# Patient Record
Sex: MALE | Race: WHITE | URBAN - METROPOLITAN AREA
[De-identification: names, ages, dates, MRNs, and addresses within clinical notes are randomized per-mention and may not be internally consistent; named-entity substitution may affect disease eponyms.]

---

## 2017-02-07 ENCOUNTER — OFFICE VISIT (OUTPATIENT)
Dept: ORTHOPEDIC SURGERY | Age: 57
End: 2017-02-07

## 2017-02-07 VITALS
DIASTOLIC BLOOD PRESSURE: 78 MMHG | SYSTOLIC BLOOD PRESSURE: 134 MMHG | HEART RATE: 80 BPM | BODY MASS INDEX: 34.3 KG/M2 | WEIGHT: 260 LBS

## 2017-02-07 DIAGNOSIS — M17.12 PRIMARY OSTEOARTHRITIS OF LEFT KNEE: Primary | ICD-10-CM

## 2017-02-07 DIAGNOSIS — S83.242D TEAR OF MEDIAL MENISCUS OF LEFT KNEE, UNSPECIFIED TEAR TYPE, UNSPECIFIED WHETHER OLD OR CURRENT TEAR, SUBSEQUENT ENCOUNTER: ICD-10-CM

## 2017-02-07 PROBLEM — S83.242A TEAR OF MEDIAL MENISCUS OF LEFT KNEE: Status: ACTIVE | Noted: 2017-02-07

## 2017-02-07 PROCEDURE — E0114 CRUTCH UNDERARM PAIR NO WOOD: HCPCS | Performed by: ORTHOPAEDIC SURGERY

## 2017-02-07 PROCEDURE — 99214 OFFICE O/P EST MOD 30 MIN: CPT | Performed by: ORTHOPAEDIC SURGERY

## 2017-02-07 ASSESSMENT — ENCOUNTER SYMPTOMS: ROS SKIN COMMENTS: SKIN CANCER

## 2017-02-16 ENCOUNTER — TELEPHONE (OUTPATIENT)
Dept: ORTHOPEDIC SURGERY | Age: 57
End: 2017-02-16

## 2017-02-21 ENCOUNTER — HOSPITAL ENCOUNTER (OUTPATIENT)
Dept: PREADMISSION TESTING | Age: 57
Discharge: HOME | End: 2017-03-14
Attending: ORTHOPAEDIC SURGERY | Admitting: ORTHOPAEDIC SURGERY

## 2017-02-21 VITALS — HEIGHT: 72 IN | BODY MASS INDEX: 35.21 KG/M2 | WEIGHT: 260 LBS

## 2017-02-21 RX ORDER — CHLORHEXIDINE GLUCONATE 0.12 MG/ML
15 RINSE ORAL 2 TIMES DAILY
Status: CANCELLED | OUTPATIENT
Start: 2017-02-21

## 2017-02-22 ENCOUNTER — TELEPHONE (OUTPATIENT)
Dept: ORTHOPEDIC SURGERY | Age: 57
End: 2017-02-22

## 2017-03-17 ENCOUNTER — HOSPITAL ENCOUNTER (OUTPATIENT)
Dept: PREADMISSION TESTING | Age: 57
Discharge: HOME | End: 2017-03-17
Attending: ORTHOPAEDIC SURGERY | Admitting: ORTHOPAEDIC SURGERY

## 2017-03-17 VITALS — HEIGHT: 72 IN | BODY MASS INDEX: 35.21 KG/M2 | WEIGHT: 260 LBS

## 2017-03-22 ENCOUNTER — HOSPITAL ENCOUNTER (OUTPATIENT)
Dept: SURGERY | Age: 57
Discharge: HOME | End: 2017-03-22
Admitting: ORTHOPAEDIC SURGERY

## 2017-03-22 VITALS
HEIGHT: 72 IN | TEMPERATURE: 97.5 F | HEART RATE: 58 BPM | BODY MASS INDEX: 35.21 KG/M2 | RESPIRATION RATE: 9 BRPM | WEIGHT: 260 LBS | OXYGEN SATURATION: 97 % | DIASTOLIC BLOOD PRESSURE: 63 MMHG | SYSTOLIC BLOOD PRESSURE: 109 MMHG

## 2017-03-22 LAB
INR BLD: 1.04 (ref 0.85–1.15)
PROTHROMBIN TIME: 11.7 SEC (ref 9.6–13)

## 2017-03-22 RX ORDER — MORPHINE SULFATE 2 MG/ML
2 INJECTION, SOLUTION INTRAMUSCULAR; INTRAVENOUS EVERY 5 MIN PRN
Status: DISCONTINUED | OUTPATIENT
Start: 2017-03-22 | End: 2017-03-23 | Stop reason: HOSPADM

## 2017-03-22 RX ORDER — MIDAZOLAM HYDROCHLORIDE 1 MG/ML
2 INJECTION INTRAMUSCULAR; INTRAVENOUS
Status: ACTIVE | OUTPATIENT
Start: 2017-03-22 | End: 2017-03-22

## 2017-03-22 RX ORDER — ONDANSETRON 2 MG/ML
4 INJECTION INTRAMUSCULAR; INTRAVENOUS
Status: ACTIVE | OUTPATIENT
Start: 2017-03-22 | End: 2017-03-22

## 2017-03-22 RX ORDER — METOCLOPRAMIDE HYDROCHLORIDE 5 MG/ML
10 INJECTION INTRAMUSCULAR; INTRAVENOUS ONCE
Status: COMPLETED | OUTPATIENT
Start: 2017-03-22 | End: 2017-03-22

## 2017-03-22 RX ORDER — SODIUM CHLORIDE, SODIUM LACTATE, POTASSIUM CHLORIDE, CALCIUM CHLORIDE 600; 310; 30; 20 MG/100ML; MG/100ML; MG/100ML; MG/100ML
INJECTION, SOLUTION INTRAVENOUS CONTINUOUS
Status: DISCONTINUED | OUTPATIENT
Start: 2017-03-22 | End: 2017-03-23 | Stop reason: HOSPADM

## 2017-03-22 RX ORDER — DEXAMETHASONE SODIUM PHOSPHATE 4 MG/ML
10 INJECTION, SOLUTION INTRA-ARTICULAR; INTRALESIONAL; INTRAMUSCULAR; INTRAVENOUS; SOFT TISSUE ONCE
Status: COMPLETED | OUTPATIENT
Start: 2017-03-22 | End: 2017-03-22

## 2017-03-22 RX ORDER — GLYCOPYRROLATE 0.2 MG/ML
0.1 INJECTION INTRAMUSCULAR; INTRAVENOUS ONCE
Status: DISCONTINUED | OUTPATIENT
Start: 2017-03-22 | End: 2017-03-23 | Stop reason: HOSPADM

## 2017-03-22 RX ORDER — SODIUM CHLORIDE 0.9 % (FLUSH) 0.9 %
10 SYRINGE (ML) INJECTION EVERY 12 HOURS SCHEDULED
Status: DISCONTINUED | OUTPATIENT
Start: 2017-03-22 | End: 2017-03-23 | Stop reason: HOSPADM

## 2017-03-22 RX ORDER — MEPERIDINE HYDROCHLORIDE 25 MG/ML
12.5 INJECTION INTRAMUSCULAR; INTRAVENOUS; SUBCUTANEOUS EVERY 5 MIN PRN
Status: DISCONTINUED | OUTPATIENT
Start: 2017-03-22 | End: 2017-03-23 | Stop reason: HOSPADM

## 2017-03-22 RX ORDER — GLYCOPYRROLATE 0.2 MG/ML
0.1 INJECTION INTRAMUSCULAR; INTRAVENOUS ONCE
Status: COMPLETED | OUTPATIENT
Start: 2017-03-22 | End: 2017-03-22

## 2017-03-22 RX ORDER — LABETALOL HYDROCHLORIDE 5 MG/ML
5 INJECTION, SOLUTION INTRAVENOUS EVERY 10 MIN PRN
Status: DISCONTINUED | OUTPATIENT
Start: 2017-03-22 | End: 2017-03-23 | Stop reason: HOSPADM

## 2017-03-22 RX ORDER — SODIUM CHLORIDE 0.9 % (FLUSH) 0.9 %
10 SYRINGE (ML) INJECTION PRN
Status: DISCONTINUED | OUTPATIENT
Start: 2017-03-22 | End: 2017-03-23 | Stop reason: HOSPADM

## 2017-03-22 RX ORDER — CHLORHEXIDINE GLUCONATE 0.12 MG/ML
15 RINSE ORAL 2 TIMES DAILY
Status: DISCONTINUED | OUTPATIENT
Start: 2017-03-22 | End: 2017-03-23 | Stop reason: HOSPADM

## 2017-03-22 RX ORDER — ONDANSETRON 2 MG/ML
4 INJECTION INTRAMUSCULAR; INTRAVENOUS ONCE
Status: COMPLETED | OUTPATIENT
Start: 2017-03-22 | End: 2017-03-22

## 2017-03-22 RX ORDER — FENTANYL CITRATE 50 UG/ML
50 INJECTION, SOLUTION INTRAMUSCULAR; INTRAVENOUS EVERY 5 MIN PRN
Status: DISCONTINUED | OUTPATIENT
Start: 2017-03-22 | End: 2017-03-23 | Stop reason: HOSPADM

## 2017-03-22 RX ORDER — CLINDAMYCIN PHOSPHATE 900 MG/50ML
900 INJECTION INTRAVENOUS ONCE
Status: DISCONTINUED | OUTPATIENT
Start: 2017-03-22 | End: 2017-03-23 | Stop reason: HOSPADM

## 2017-03-22 RX ADMIN — CHLORHEXIDINE GLUCONATE 15 ML: 0.12 RINSE ORAL at 11:56

## 2017-03-22 RX ADMIN — METOCLOPRAMIDE HYDROCHLORIDE 10 MG: 5 INJECTION INTRAMUSCULAR; INTRAVENOUS at 08:08

## 2017-03-22 RX ADMIN — GLYCOPYRROLATE 0.1 MG: 0.2 INJECTION INTRAMUSCULAR; INTRAVENOUS at 08:13

## 2017-03-22 RX ADMIN — DEXAMETHASONE SODIUM PHOSPHATE 10 MG: 4 INJECTION, SOLUTION INTRA-ARTICULAR; INTRALESIONAL; INTRAMUSCULAR; INTRAVENOUS; SOFT TISSUE at 08:03

## 2017-03-22 RX ADMIN — ONDANSETRON 4 MG: 2 INJECTION INTRAMUSCULAR; INTRAVENOUS at 08:07

## 2017-03-22 RX ADMIN — CHLORHEXIDINE GLUCONATE 15 ML: 0.12 RINSE ORAL at 07:12

## 2017-03-22 RX ADMIN — SODIUM CHLORIDE, SODIUM LACTATE, POTASSIUM CHLORIDE, CALCIUM CHLORIDE: 600; 310; 30; 20 INJECTION, SOLUTION INTRAVENOUS at 07:06

## 2017-03-22 ASSESSMENT — PAIN - FUNCTIONAL ASSESSMENT: PAIN_FUNCTIONAL_ASSESSMENT: 0-10

## 2017-03-24 ENCOUNTER — HOSPITAL ENCOUNTER (OUTPATIENT)
Dept: PHYSICAL THERAPY | Age: 57
Discharge: HOME | End: 2017-03-24
Admitting: ORTHOPAEDIC SURGERY

## 2017-03-24 ENCOUNTER — OFFICE VISIT (OUTPATIENT)
Dept: ORTHOPEDIC SURGERY | Age: 57
End: 2017-03-24

## 2017-03-24 VITALS
HEART RATE: 68 BPM | BODY MASS INDEX: 35.21 KG/M2 | SYSTOLIC BLOOD PRESSURE: 124 MMHG | WEIGHT: 260 LBS | DIASTOLIC BLOOD PRESSURE: 72 MMHG | HEIGHT: 72 IN

## 2017-03-24 DIAGNOSIS — Z98.890 S/P LEFT KNEE ARTHROSCOPY: Primary | ICD-10-CM

## 2017-03-24 DIAGNOSIS — S83.242D TEAR OF MEDIAL MENISCUS OF LEFT KNEE, UNSPECIFIED TEAR TYPE, UNSPECIFIED WHETHER OLD OR CURRENT TEAR, SUBSEQUENT ENCOUNTER: ICD-10-CM

## 2017-03-24 PROCEDURE — 99024 POSTOP FOLLOW-UP VISIT: CPT | Performed by: ORTHOPAEDIC SURGERY

## 2017-03-24 PROCEDURE — 20610 DRAIN/INJ JOINT/BURSA W/O US: CPT | Performed by: ORTHOPAEDIC SURGERY

## 2017-03-24 ASSESSMENT — ENCOUNTER SYMPTOMS: ROS SKIN COMMENTS: SKIN CANCER

## 2017-03-28 ENCOUNTER — HOSPITAL ENCOUNTER (OUTPATIENT)
Dept: PHYSICAL THERAPY | Age: 57
Discharge: HOME | End: 2017-03-28
Admitting: ORTHOPAEDIC SURGERY

## 2017-03-29 ENCOUNTER — TELEPHONE (OUTPATIENT)
Dept: PHYSICAL THERAPY | Age: 57
End: 2017-03-29

## 2017-03-30 ENCOUNTER — HOSPITAL ENCOUNTER (OUTPATIENT)
Dept: PHYSICAL THERAPY | Age: 57
Discharge: HOME | End: 2017-03-30
Admitting: ORTHOPAEDIC SURGERY

## 2017-04-04 ENCOUNTER — HOSPITAL ENCOUNTER (OUTPATIENT)
Dept: PHYSICAL THERAPY | Age: 57
Discharge: HOME | End: 2017-04-04
Admitting: ORTHOPAEDIC SURGERY

## 2017-04-06 ENCOUNTER — HOSPITAL ENCOUNTER (OUTPATIENT)
Dept: PHYSICAL THERAPY | Age: 57
Discharge: HOME | End: 2017-04-06
Admitting: ORTHOPAEDIC SURGERY

## 2017-04-21 ENCOUNTER — OFFICE VISIT (OUTPATIENT)
Dept: ORTHOPEDIC SURGERY | Age: 57
End: 2017-04-21

## 2017-04-21 VITALS
WEIGHT: 259.92 LBS | BODY MASS INDEX: 35.21 KG/M2 | DIASTOLIC BLOOD PRESSURE: 86 MMHG | HEIGHT: 72 IN | SYSTOLIC BLOOD PRESSURE: 126 MMHG | HEART RATE: 84 BPM

## 2017-04-21 DIAGNOSIS — S83.242A ACUTE MEDIAL MENISCUS TEAR OF LEFT KNEE, INITIAL ENCOUNTER: Primary | ICD-10-CM

## 2017-04-21 PROCEDURE — 99024 POSTOP FOLLOW-UP VISIT: CPT | Performed by: ORTHOPAEDIC SURGERY

## 2017-04-21 ASSESSMENT — ENCOUNTER SYMPTOMS: ROS SKIN COMMENTS: SKIN CANCER

## 2021-11-14 ENCOUNTER — HOSPITAL ENCOUNTER (EMERGENCY)
Age: 61
Discharge: HOME | End: 2021-11-14
Attending: EMERGENCY MEDICINE
Payer: COMMERCIAL

## 2021-11-14 VITALS
DIASTOLIC BLOOD PRESSURE: 78 MMHG | SYSTOLIC BLOOD PRESSURE: 129 MMHG | HEART RATE: 69 BPM | RESPIRATION RATE: 18 BRPM | BODY MASS INDEX: 33.8 KG/M2 | HEIGHT: 73 IN | OXYGEN SATURATION: 97 % | WEIGHT: 255 LBS | TEMPERATURE: 97.9 F

## 2021-11-14 DIAGNOSIS — M54.50 ACUTE BILATERAL LOW BACK PAIN WITHOUT SCIATICA: Primary | ICD-10-CM

## 2021-11-14 PROCEDURE — 96372 THER/PROPH/DIAG INJ SC/IM: CPT

## 2021-11-14 PROCEDURE — 74011000250 HC RX REV CODE- 250: Performed by: PHYSICIAN ASSISTANT

## 2021-11-14 PROCEDURE — 74011250637 HC RX REV CODE- 250/637: Performed by: PHYSICIAN ASSISTANT

## 2021-11-14 PROCEDURE — 74011250636 HC RX REV CODE- 250/636: Performed by: PHYSICIAN ASSISTANT

## 2021-11-14 PROCEDURE — 99283 EMERGENCY DEPT VISIT LOW MDM: CPT

## 2021-11-14 RX ORDER — LIDOCAINE 4 G/100G
2 PATCH TOPICAL
Status: DISCONTINUED | OUTPATIENT
Start: 2021-11-14 | End: 2021-11-14 | Stop reason: HOSPADM

## 2021-11-14 RX ORDER — HYDROCODONE BITARTRATE AND ACETAMINOPHEN 5; 325 MG/1; MG/1
1 TABLET ORAL
Status: COMPLETED | OUTPATIENT
Start: 2021-11-14 | End: 2021-11-14

## 2021-11-14 RX ORDER — IBUPROFEN 800 MG/1
800 TABLET ORAL
Qty: 20 TABLET | Refills: 0 | Status: SHIPPED | OUTPATIENT
Start: 2021-11-14 | End: 2021-11-21

## 2021-11-14 RX ORDER — KETOROLAC TROMETHAMINE 30 MG/ML
30 INJECTION, SOLUTION INTRAMUSCULAR; INTRAVENOUS
Status: COMPLETED | OUTPATIENT
Start: 2021-11-14 | End: 2021-11-14

## 2021-11-14 RX ORDER — LIDOCAINE 50 MG/G
PATCH TOPICAL
Qty: 8 PATCH | Refills: 0 | Status: SHIPPED | OUTPATIENT
Start: 2021-11-14 | End: 2022-01-19

## 2021-11-14 RX ORDER — CYCLOBENZAPRINE HCL 10 MG
10 TABLET ORAL
Qty: 10 TABLET | Refills: 0 | Status: SHIPPED | OUTPATIENT
Start: 2021-11-14 | End: 2021-11-17

## 2021-11-14 RX ADMIN — KETOROLAC TROMETHAMINE 30 MG: 30 INJECTION, SOLUTION INTRAMUSCULAR; INTRAVENOUS at 16:08

## 2021-11-14 RX ADMIN — HYDROCODONE BITARTRATE AND ACETAMINOPHEN 1 TABLET: 5; 325 TABLET ORAL at 16:08

## 2021-11-14 NOTE — ED PROVIDER NOTES
28-year-old male who presents with chief complaint of lower back pain. This pain started abruptly after he bent over to  some heavy objects this morning. He states he and his wife just moved here from [place], for the last 3 weeks he has been doing lots of heavy lifting. He has had minor back strains during this time that have been relieved with his prescription for Flexeril and hydrocodone. Has taken 2 of these today with no improvement of his symptoms. He has no associated symptoms such as bladder or bowel incontinence, he does not have any saddle anesthesia. He has no radiation of his pain, denies any chest pain, dizziness, headache, visual changes, focal motor weakness or focal neuro deficit. Past Medical History:   Diagnosis Date    A-fib ([facility] [address]     BPH (benign prostatic hyperplasia)     Sarcoidosis        Past Surgical History:   Procedure Laterality Date    HX ORTHOPAEDIC      3 knee surgeries          No family history on file. Social History     Socioeconomic History    Marital status:      Spouse name: Not on file    Number of children: Not on file    Years of education: Not on file    Highest education level: Not on file   Occupational History    Not on file   Tobacco Use    Smoking status: Never Smoker    Smokeless tobacco: Never Used   Substance and Sexual Activity    Alcohol use: Yes     Alcohol/week: 3.0 standard drinks     Types: 3 Glasses of wine per week    Drug use: Never    Sexual activity: Not on file   Other Topics Concern    Not on file   Social History Narrative    Not on file     Social Determinants of Health     Financial Resource Strain:     Difficulty of Paying Living Expenses: Not on file   Food Insecurity:     Worried About Running Out of Food in the Last Year: Not on file    Sheri of Food in the Last Year: Not on file   Transportation Needs:     Lack of Transportation (Medical):  Not on file    Lack of Transportation (Non-Medical): Not on file   Physical Activity:     Days of Exercise per Week: Not on file    Minutes of Exercise per Session: Not on file   Stress:     Feeling of Stress : Not on file   Social Connections:     Frequency of Communication with Friends and Family: Not on file    Frequency of Social Gatherings with Friends and Family: Not on file    Attends [employer] Services: Not on file    Active Member of [address] [place] [address] or Organizations: Not on file    Attends Club or Organization Meetings: Not on file    Marital Status: Not on file   Intimate Partner Violence:     Fear of Current or Ex-Partner: Not on file    Emotionally Abused: Not on file    Physically Abused: Not on file    Sexually Abused: Not on file   Housing Stability:     Unable to Pay for Housing in the Last Year: Not on file    Number of [place] in the Last Year: Not on file    Unstable Housing in the Last Year: Not on file         ALLERGIES: Amoxicillin    Review of Systems   Constitutional: Negative for activity change and chills. HENT: Negative for nosebleeds. Respiratory: Negative for cough. Gastrointestinal: Negative for nausea. Genitourinary: Negative for flank pain. Musculoskeletal: Positive for back pain. Negative for arthralgias, gait problem, joint swelling, neck pain and neck stiffness. Neurological: Negative for seizures, light-headedness and numbness. Vitals:    11/14/21 1456 11/14/21 1521   BP: (!) 148/102    Pulse: 69    Resp: 18    Temp: 97.9 °F (36.6 °C)    SpO2: 95% 95%   Weight: 115.7 kg (255 lb)    Height: 6' 1\" (1.854 m)             Physical Exam  Vitals and nursing note reviewed. Constitutional:       General: He is not in acute distress. Appearance: Normal appearance. He is normal weight. He is not ill-appearing, toxic-appearing or diaphoretic. HENT:      Head: Normocephalic and atraumatic.       Nose: Nose normal.      Mouth/Throat:      Mouth: Mucous membranes are moist.   Eyes:      Pupils: Pupils are equal, round, and reactive to light. Cardiovascular:      Rate and Rhythm: Normal rate and regular rhythm. Pulmonary:      Effort: Pulmonary effort is normal.   Abdominal:      General: Abdomen is flat. Palpations: Abdomen is soft. Musculoskeletal:        Back:    Neurological:      Mental Status: He is alert. MDM  Number of Diagnoses or Management Options  Diagnosis management comments: 27-year-old male who presents to the ED with his wife with chief complaint of lower back pain. This back pain started acutely this morning as he bent over to lift some heavy boxes. He is a nonemergent exam.  Has got no point tenderness along the spine. This is nontraumatic back pain. Has had muscle strains in the past patient states this feels similar however much worse. He did have some relief. Emergency room after administration of lidocaine patches, hydrocodone and Toradol here. Advised that he continue these at home. He has home prescriptions of hydrocodone. Will refill his Flexeril at a higher dose. Advised ibuprofen at home as well. He is without saddle anesthesia, bladder or bowel incontinence, there are no warning signs or red flags for acute cord compression syndrome in this patient. He is able to ambulate with a cane which is at his baseline. He has no lower extremity weakness. Will discharge home with instructions to follow-up with primary care and orthopedics, both primary care and orthopedics offices will provide outpatient discharge as he recently moved here from [place]. Questions were answered and we are agreeable and is discharged at this time. Voice dictation software was used during the making of this note. This software is not perfect and grammatical and other typographical errors may be present. This note has been proofread, but may still contain errors.    Melody Dillon PA-C        Amount and/or Complexity of Data Reviewed  Clinical lab tests: ordered and reviewed    Risk of Complications, Morbidity, and/or Mortality  Presenting problems: low  Diagnostic procedures: low  Management options: low    Patient Progress  Patient progress: improved         Procedures

## 2021-11-14 NOTE — DISCHARGE INSTRUCTIONS
Use your new medications as prescribed. Only use your hydrocodone which you already have a prescription for if you have breakthrough pain. Follow-up with primary care which you have been given information. Also follow-up with orthopedics. Return here for worsening or worrisome symptoms.

## 2021-11-14 NOTE — ED NOTES
I have reviewed discharge instructions with the patient. The patient verbalized understanding. Patient left ED via Discharge Method: ambulatory to Home with his wife. .  Opportunity for questions and clarification provided. Patient given 3 scripts. To continue your aftercare when you leave the hospital, you may receive an automated call from our care team to check in on how you are doing.  This is a free service and part of our promise to provide the best care and service to meet your aftercare needs. \" If you have questions, or wish to unsubscribe from this service please call [phone].  Thank you for Choosing our [facility] Emergency Department.

## 2021-11-14 NOTE — ED TRIAGE NOTES
Patient presents with complaints lower back pain worsening over the day. Patient states that he bent over this morning had pain. Patient denies bowel or bladder incontinence. Patient states that there are times that he is unable to make it to the bathroom due to the pain but has urge to urinate.

## 2022-01-05 PROBLEM — K21.00 GASTROESOPHAGEAL REFLUX DISEASE WITH ESOPHAGITIS: Status: ACTIVE | Noted: 2022-01-05

## 2022-01-05 PROBLEM — E66.9 OBESITY (BMI 35.0-39.9 WITHOUT COMORBIDITY): Status: ACTIVE | Noted: 2022-01-05

## 2022-01-05 PROBLEM — Z85.828 HX OF SKIN CANCER, BASAL CELL: Status: ACTIVE | Noted: 2022-01-05

## 2022-01-05 PROBLEM — H91.93 BILATERAL HEARING LOSS: Status: ACTIVE | Noted: 2022-01-05

## 2022-01-05 PROBLEM — K21.9 GASTROESOPHAGEAL REFLUX DISEASE: Status: ACTIVE | Noted: 2022-01-05

## 2022-01-05 PROBLEM — G62.9 PERIPHERAL POLYNEUROPATHY: Status: ACTIVE | Noted: 2022-01-05

## 2022-01-05 PROBLEM — E55.9 VITAMIN D DEFICIENCY: Status: ACTIVE | Noted: 2022-01-05

## 2022-01-05 PROBLEM — M47.816 LUMBAR SPONDYLOSIS: Status: ACTIVE | Noted: 2022-01-05

## 2022-01-05 PROBLEM — I10 HYPERTENSION: Status: ACTIVE | Noted: 2022-01-05

## 2022-01-05 PROBLEM — C61 PROSTATE CANCER (HCC): Status: ACTIVE | Noted: 2022-01-05

## 2022-01-05 PROBLEM — Z00.00 PREVENTATIVE HEALTH CARE: Status: ACTIVE | Noted: 2022-01-05

## 2022-01-12 ENCOUNTER — HOSPITAL ENCOUNTER (EMERGENCY)
Age: 62
Discharge: HOME | End: 2022-01-12
Attending: STUDENT IN AN ORGANIZED HEALTH CARE EDUCATION/TRAINING PROGRAM
Payer: COMMERCIAL

## 2022-01-12 ENCOUNTER — APPOINTMENT (OUTPATIENT)
Dept: GENERAL RADIOLOGY | Age: 62
End: 2022-01-12
Attending: EMERGENCY MEDICINE
Payer: COMMERCIAL

## 2022-01-12 ENCOUNTER — HOSPITAL ENCOUNTER (OUTPATIENT)
Dept: PHYSICAL THERAPY | Age: 62
End: 2022-01-12
Attending: INTERNAL MEDICINE

## 2022-01-12 VITALS
WEIGHT: 260 LBS | SYSTOLIC BLOOD PRESSURE: 143 MMHG | HEART RATE: 87 BPM | HEIGHT: 73 IN | BODY MASS INDEX: 34.46 KG/M2 | DIASTOLIC BLOOD PRESSURE: 97 MMHG | TEMPERATURE: 98.8 F | OXYGEN SATURATION: 100 % | RESPIRATION RATE: 16 BRPM

## 2022-01-12 DIAGNOSIS — J18.9 PNEUMONIA OF RIGHT LOWER LOBE DUE TO INFECTIOUS ORGANISM: Primary | ICD-10-CM

## 2022-01-12 LAB
COVID-19 RAPID TEST, COVR: NOT DETECTED
SOURCE, COVRS: NORMAL

## 2022-01-12 PROCEDURE — 71046 X-RAY EXAM CHEST 2 VIEWS: CPT

## 2022-01-12 PROCEDURE — 99282 EMERGENCY DEPT VISIT SF MDM: CPT

## 2022-01-12 PROCEDURE — 87635 SARS-COV-2 COVID-19 AMP PRB: CPT

## 2022-01-12 RX ORDER — CEFPODOXIME PROXETIL 200 MG/1
200 TABLET, FILM COATED ORAL 2 TIMES DAILY
Qty: 10 TABLET | Refills: 0 | Status: SHIPPED | OUTPATIENT
Start: 2022-01-12 | End: 2022-01-17

## 2022-01-12 RX ORDER — AZITHROMYCIN 250 MG/1
TABLET, FILM COATED ORAL
Qty: 6 TABLET | Refills: 0 | Status: SHIPPED | OUTPATIENT
Start: 2022-01-12 | End: 2022-01-17

## 2022-01-12 RX ORDER — BENZONATATE 100 MG/1
100 CAPSULE ORAL
Qty: 21 CAPSULE | Refills: 0 | Status: SHIPPED | OUTPATIENT
Start: 2022-01-12 | End: 2022-01-19

## 2022-01-12 NOTE — DISCHARGE INSTRUCTIONS
Make sure to get plenty of rest, treat fever with tylenol and/or motrin. Can take tessalon pearls every 8 hours for cough. Take full course of antibiotics and follow up with your doctor in 2 days for re-evaluation. Return to the ER with any worsening fevers, difficulty breathing or concerning symptoms.

## 2022-01-12 NOTE — ED NOTES
I have reviewed discharge instructions with the patient. The patient verbalized understanding. Patient left ED via Discharge Method: ambulatory to Home with self transport. Opportunity for questions and clarification provided. Patient given 3 scripts. To continue your aftercare when you leave the hospital, you may receive an automated call from our care team to check in on how you are doing. This is a free service and part of our promise to provide the best care and service to meet your aftercare needs.  If you have questions, or wish to unsubscribe from this service please call [phone]. Thank you for Choosing our [address] [place] [address] Emergency Department.

## 2022-01-12 NOTE — ED TRIAGE NOTES
Pt arrives via pov for concerns for covid. States since Sunday Right rib pain with deep breathing and cough. States fever of 100.5 this morning. Did not take meds pta. Fully vaccinated. NAD. Masked.

## 2022-01-19 PROBLEM — G47.33 OBSTRUCTIVE SLEEP APNEA SYNDROME: Status: ACTIVE | Noted: 2022-01-19

## 2022-01-19 PROBLEM — R73.03 PREDIABETES: Status: ACTIVE | Noted: 2022-01-19

## 2022-01-26 PROBLEM — R07.89 CHEST WALL PAIN: Status: ACTIVE | Noted: 2022-01-26

## 2022-01-27 ENCOUNTER — HOSPITAL ENCOUNTER (OUTPATIENT)
Age: 62
Setting detail: OBSERVATION
Discharge: HOME | End: 2022-01-28
Attending: EMERGENCY MEDICINE | Admitting: FAMILY MEDICINE
Payer: COMMERCIAL

## 2022-01-27 ENCOUNTER — HOSPITAL ENCOUNTER (OUTPATIENT)
Dept: CT IMAGING | Age: 62
Discharge: HOME | End: 2022-01-27
Attending: INTERNAL MEDICINE
Payer: COMMERCIAL

## 2022-01-27 ENCOUNTER — APPOINTMENT (OUTPATIENT)
Dept: CT IMAGING | Age: 62
End: 2022-01-27
Attending: EMERGENCY MEDICINE
Payer: COMMERCIAL

## 2022-01-27 DIAGNOSIS — R07.89 CHEST WALL PAIN: ICD-10-CM

## 2022-01-27 DIAGNOSIS — I26.99 OTHER ACUTE PULMONARY EMBOLISM WITHOUT ACUTE COR PULMONALE (HCC): Primary | ICD-10-CM

## 2022-01-27 LAB
ALBUMIN SERPL-MCNC: 3.4 G/DL (ref 3.2–4.6)
ALBUMIN/GLOB SERPL: 0.9 {RATIO} (ref 1.2–3.5)
ALP SERPL-CCNC: 105 U/L (ref 50–136)
ALT SERPL-CCNC: 26 U/L (ref 12–65)
ANION GAP SERPL CALC-SCNC: 5 MMOL/L (ref 7–16)
APTT PPP: 30.1 SEC (ref 24.1–35.1)
AST SERPL-CCNC: 18 U/L (ref 15–37)
BASOPHILS # BLD: 0 K/UL (ref 0–0.2)
BASOPHILS NFR BLD: 0 % (ref 0–2)
BILIRUB SERPL-MCNC: 0.3 MG/DL (ref 0.2–1.1)
BNP SERPL-MCNC: 45 PG/ML (ref 5–125)
BUN SERPL-MCNC: 11 MG/DL (ref 8–23)
CALCIUM SERPL-MCNC: 9.1 MG/DL (ref 8.3–10.4)
CHLORIDE SERPL-SCNC: 110 MMOL/L (ref 98–107)
CO2 SERPL-SCNC: 27 MMOL/L (ref 21–32)
CREAT SERPL-MCNC: 0.96 MG/DL (ref 0.8–1.5)
DIFFERENTIAL METHOD BLD: NORMAL
EOSINOPHIL # BLD: 0.3 K/UL (ref 0–0.8)
EOSINOPHIL NFR BLD: 3 % (ref 0.5–7.8)
ERYTHROCYTE [DISTWIDTH] IN BLOOD BY AUTOMATED COUNT: 12.7 % (ref 11.9–14.6)
GLOBULIN SER CALC-MCNC: 3.7 G/DL (ref 2.3–3.5)
GLUCOSE SERPL-MCNC: 125 MG/DL (ref 65–100)
HCT VFR BLD AUTO: 43.3 % (ref 41.1–50.3)
HGB BLD-MCNC: 14.5 G/DL (ref 13.6–17.2)
IMM GRANULOCYTES # BLD AUTO: 0.1 K/UL (ref 0–0.5)
IMM GRANULOCYTES NFR BLD AUTO: 1 % (ref 0–5)
INR PPP: 1
LIPASE SERPL-CCNC: 102 U/L (ref 73–393)
LYMPHOCYTES # BLD: 2.7 K/UL (ref 0.5–4.6)
LYMPHOCYTES NFR BLD: 29 % (ref 13–44)
MAGNESIUM SERPL-MCNC: 2.3 MG/DL (ref 1.8–2.4)
MCH RBC QN AUTO: 30 PG (ref 26.1–32.9)
MCHC RBC AUTO-ENTMCNC: 33.5 G/DL (ref 31.4–35)
MCV RBC AUTO: 89.5 FL (ref 79.6–97.8)
MONOCYTES # BLD: 1 K/UL (ref 0.1–1.3)
MONOCYTES NFR BLD: 10 % (ref 4–12)
NEUTS SEG # BLD: 5.4 K/UL (ref 1.7–8.2)
NEUTS SEG NFR BLD: 58 % (ref 43–78)
NRBC # BLD: 0 K/UL (ref 0–0.2)
PLATELET # BLD AUTO: 315 K/UL (ref 150–450)
PMV BLD AUTO: 10 FL (ref 9.4–12.3)
POTASSIUM SERPL-SCNC: 3.5 MMOL/L (ref 3.5–5.1)
PROT SERPL-MCNC: 7.1 G/DL (ref 6.3–8.2)
PROTHROMBIN TIME: 13.3 SEC (ref 12.6–14.5)
RBC # BLD AUTO: 4.84 M/UL (ref 4.23–5.6)
SODIUM SERPL-SCNC: 142 MMOL/L (ref 138–145)
TROPONIN-HIGH SENSITIVITY: 5.1 PG/ML (ref 0–14)
WBC # BLD AUTO: 9.4 K/UL (ref 4.3–11.1)

## 2022-01-27 PROCEDURE — 84484 ASSAY OF TROPONIN QUANT: CPT

## 2022-01-27 PROCEDURE — 83735 ASSAY OF MAGNESIUM: CPT

## 2022-01-27 PROCEDURE — 71260 CT THORAX DX C+: CPT

## 2022-01-27 PROCEDURE — 96365 THER/PROPH/DIAG IV INF INIT: CPT

## 2022-01-27 PROCEDURE — 70450 CT HEAD/BRAIN W/O DYE: CPT

## 2022-01-27 PROCEDURE — 80053 COMPREHEN METABOLIC PANEL: CPT

## 2022-01-27 PROCEDURE — 96376 TX/PRO/DX INJ SAME DRUG ADON: CPT

## 2022-01-27 PROCEDURE — 85610 PROTHROMBIN TIME: CPT

## 2022-01-27 PROCEDURE — 85025 COMPLETE CBC W/AUTO DIFF WBC: CPT

## 2022-01-27 PROCEDURE — 96366 THER/PROPH/DIAG IV INF ADDON: CPT

## 2022-01-27 PROCEDURE — 96375 TX/PRO/DX INJ NEW DRUG ADDON: CPT

## 2022-01-27 PROCEDURE — 74011000636 HC RX REV CODE- 636: Performed by: INTERNAL MEDICINE

## 2022-01-27 PROCEDURE — 93005 ELECTROCARDIOGRAM TRACING: CPT | Performed by: EMERGENCY MEDICINE

## 2022-01-27 PROCEDURE — 83690 ASSAY OF LIPASE: CPT

## 2022-01-27 PROCEDURE — 74011250637 HC RX REV CODE- 250/637: Performed by: EMERGENCY MEDICINE

## 2022-01-27 PROCEDURE — 83880 ASSAY OF NATRIURETIC PEPTIDE: CPT

## 2022-01-27 PROCEDURE — 74011250636 HC RX REV CODE- 250/636: Performed by: EMERGENCY MEDICINE

## 2022-01-27 PROCEDURE — 99284 EMERGENCY DEPT VISIT MOD MDM: CPT

## 2022-01-27 PROCEDURE — 85730 THROMBOPLASTIN TIME PARTIAL: CPT

## 2022-01-27 PROCEDURE — 74011000258 HC RX REV CODE- 258: Performed by: INTERNAL MEDICINE

## 2022-01-27 RX ORDER — HEPARIN SODIUM 5000 [USP'U]/100ML
18-36 INJECTION, SOLUTION INTRAVENOUS
Status: DISCONTINUED | OUTPATIENT
Start: 2022-01-27 | End: 2022-01-28 | Stop reason: HOSPADM

## 2022-01-27 RX ORDER — NALBUPHINE HYDROCHLORIDE 10 MG/ML
5 INJECTION, SOLUTION INTRAMUSCULAR; INTRAVENOUS; SUBCUTANEOUS
Status: COMPLETED | OUTPATIENT
Start: 2022-01-27 | End: 2022-01-27

## 2022-01-27 RX ORDER — SODIUM CHLORIDE 0.9 % (FLUSH) 0.9 %
10 SYRINGE (ML) INJECTION
Status: COMPLETED | OUTPATIENT
Start: 2022-01-27 | End: 2022-01-27

## 2022-01-27 RX ORDER — HEPARIN SODIUM 1000 [USP'U]/ML
80 INJECTION, SOLUTION INTRAVENOUS; SUBCUTANEOUS ONCE
Status: COMPLETED | OUTPATIENT
Start: 2022-01-27 | End: 2022-01-27

## 2022-01-27 RX ORDER — ACETAMINOPHEN 500 MG
1000 TABLET ORAL
Status: COMPLETED | OUTPATIENT
Start: 2022-01-27 | End: 2022-01-27

## 2022-01-27 RX ADMIN — NALBUPHINE HYDROCHLORIDE 5 MG: 10 INJECTION, SOLUTION INTRAMUSCULAR; INTRAVENOUS; SUBCUTANEOUS at 23:27

## 2022-01-27 RX ADMIN — IOPAMIDOL 100 ML: 755 INJECTION, SOLUTION INTRAVENOUS at 16:47

## 2022-01-27 RX ADMIN — SODIUM CHLORIDE 100 ML: 9 INJECTION, SOLUTION INTRAVENOUS at 16:47

## 2022-01-27 RX ADMIN — Medication 10 ML: at 16:47

## 2022-01-27 RX ADMIN — ACETAMINOPHEN 1000 MG: 500 TABLET ORAL at 21:56

## 2022-01-27 RX ADMIN — HEPARIN SODIUM 18 UNITS/KG/HR: 5000 INJECTION, SOLUTION INTRAVENOUS at 22:09

## 2022-01-27 RX ADMIN — HEPARIN SODIUM 7610 UNITS: 1000 INJECTION INTRAVENOUS; SUBCUTANEOUS at 22:08

## 2022-01-27 NOTE — ED TRIAGE NOTES
Patient to ED earlier with R rib pain, CT scan completed at 4pm today here in ED and was seen by a provider, said it was ok for pt to go home(lives 5 mins away from hospital) and called pt to come back with results indicating PE. Patient does not show indication of immediate distress at this time  VSS stable.

## 2022-01-27 NOTE — PROGRESS NOTES
Per Dr. Kai Garcia, patient called on mobile with request to come back to the ER due to results of outpatient CT Scan.   Spoke with patient and he agreed to come into the ER asap

## 2022-01-27 NOTE — ED NOTES
Pt sent to er for + pe on chest ct, pt dx with pneumonia last week, had continued rt sided cp saw pmd today ordered labs and chest ct   Patient evaluated initially in triage. Rapid Medical Evaluation was conducted and necessary orders have been placed. I have performed a medical screening exam.  Care will now be transferred to the provider in the back of the emergency department.   JUDY Zuniga 6:39 PM

## 2022-01-27 NOTE — Clinical Note
Patient Class[de-identified] OBSERVATION [104]   Type of Bed: Remote Telemetry [29]   Cardiac Monitoring Required?: Yes   Reason for Observation: PE   Admitting Diagnosis: Pulmonary embolism [facility]) [[id number]]   Admitting Physician: Columba Crowe   Attending Physician: Columba Crowe

## 2022-01-28 ENCOUNTER — HOSPITAL ENCOUNTER (OUTPATIENT)
Dept: NON INVASIVE DIAGNOSTICS | Age: 62
Setting detail: OBSERVATION
Discharge: HOME | End: 2022-01-28
Attending: FAMILY MEDICINE
Payer: COMMERCIAL

## 2022-01-28 VITALS
OXYGEN SATURATION: 94 % | WEIGHT: 260 LBS | SYSTOLIC BLOOD PRESSURE: 109 MMHG | RESPIRATION RATE: 16 BRPM | BODY MASS INDEX: 34.46 KG/M2 | DIASTOLIC BLOOD PRESSURE: 79 MMHG | HEART RATE: 65 BPM | HEIGHT: 73 IN | TEMPERATURE: 99.1 F

## 2022-01-28 VITALS
WEIGHT: 260 LBS | BODY MASS INDEX: 34.46 KG/M2 | HEIGHT: 73 IN | SYSTOLIC BLOOD PRESSURE: 151 MMHG | DIASTOLIC BLOOD PRESSURE: 94 MMHG

## 2022-01-28 PROBLEM — I26.99 PULMONARY EMBOLISM (HCC): Status: ACTIVE | Noted: 2022-01-28

## 2022-01-28 LAB
ANION GAP SERPL CALC-SCNC: 6 MMOL/L (ref 7–16)
ATRIAL RATE: 82 BPM
BASOPHILS # BLD: 0 K/UL (ref 0–0.2)
BASOPHILS NFR BLD: 1 % (ref 0–2)
BUN SERPL-MCNC: 11 MG/DL (ref 8–23)
CALCIUM SERPL-MCNC: 8.6 MG/DL (ref 8.3–10.4)
CALCULATED P AXIS, ECG09: 24 DEGREES
CALCULATED R AXIS, ECG10: -68 DEGREES
CALCULATED T AXIS, ECG11: 4 DEGREES
CHLORIDE SERPL-SCNC: 110 MMOL/L (ref 98–107)
CO2 SERPL-SCNC: 27 MMOL/L (ref 21–32)
CREAT SERPL-MCNC: 0.74 MG/DL (ref 0.8–1.5)
DIAGNOSIS, 93000: NORMAL
DIFFERENTIAL METHOD BLD: ABNORMAL
ECHO AO ASC DIAM: 3.5 CM
ECHO AO ASCENDING AORTA INDEX: 1.45 CM/M2
ECHO AO ROOT DIAM: 3.8 CM
ECHO AO ROOT INDEX: 1.58 CM/M2
ECHO AV AREA PEAK VELOCITY: 3.1 CM2
ECHO AV AREA VTI: 2.9 CM2
ECHO AV AREA/BSA PEAK VELOCITY: 1.3 CM2/M2
ECHO AV AREA/BSA VTI: 1.2 CM2/M2
ECHO AV MEAN GRADIENT: 3 MMHG
ECHO AV MEAN VELOCITY: 0.8 M/S
ECHO AV PEAK GRADIENT: 4 MMHG
ECHO AV PEAK VELOCITY: 1.1 M/S
ECHO AV VELOCITY RATIO: 0.64
ECHO AV VTI: 26 CM
ECHO EST RA PRESSURE: 3 MMHG
ECHO IVC PROX: 2 CM
ECHO LA AREA 2C: 20.7 CM2
ECHO LA AREA 4C: 15.9 CM2
ECHO LA DIAMETER INDEX: 1.7 CM/M2
ECHO LA DIAMETER: 4.1 CM
ECHO LA MAJOR AXIS: 5.2 CM
ECHO LA MINOR AXIS: 5.9 CM
ECHO LA TO AORTIC ROOT RATIO: 1.08
ECHO LA VOL BP: 49 ML (ref 18–58)
ECHO LA VOL/BSA BIPLANE: 20 ML/M2 (ref 16–34)
ECHO LV E' LATERAL VELOCITY: 9 CM/S
ECHO LV E' SEPTAL VELOCITY: 9 CM/S
ECHO LV EDV A2C: 127 ML
ECHO LV EDV A4C: 121 ML
ECHO LV EDV BP: 128 ML (ref 67–155)
ECHO LV EDV INDEX A4C: 50 ML/M2
ECHO LV EDV INDEX BP: 53 ML/M2
ECHO LV EDV NDEX A2C: 53 ML/M2
ECHO LV EJECTION FRACTION A2C: 73 %
ECHO LV EJECTION FRACTION A4C: 66 %
ECHO LV EJECTION FRACTION BIPLANE: 70 % (ref 55–100)
ECHO LV ESV A2C: 34 ML
ECHO LV ESV A4C: 42 ML
ECHO LV ESV INDEX A2C: 14 ML/M2
ECHO LV ESV INDEX A4C: 17 ML/M2
ECHO LV FRACTIONAL SHORTENING: 35 % (ref 28–44)
ECHO LV INTERNAL DIMENSION DIASTOLE INDEX: 2.37 CM/M2
ECHO LV INTERNAL DIMENSION DIASTOLIC: 5.7 CM (ref 4.2–5.9)
ECHO LV INTERNAL DIMENSION SYSTOLIC INDEX: 1.54 CM/M2
ECHO LV INTERNAL DIMENSION SYSTOLIC: 3.7 CM
ECHO LV IVSD: 1 CM (ref 0.6–1)
ECHO LV MASS 2D: 226.4 G (ref 88–224)
ECHO LV MASS INDEX 2D: 93.9 G/M2 (ref 49–115)
ECHO LV POSTERIOR WALL DIASTOLIC: 1 CM (ref 0.6–1)
ECHO LV RELATIVE WALL THICKNESS RATIO: 0.35
ECHO LVOT AREA: 4.5 CM2
ECHO LVOT AV VTI INDEX: 0.63
ECHO LVOT DIAM: 2.4 CM
ECHO LVOT MEAN GRADIENT: 1 MMHG
ECHO LVOT PEAK GRADIENT: 2 MMHG
ECHO LVOT PEAK VELOCITY: 0.7 M/S
ECHO LVOT STROKE VOLUME INDEX: 31 ML/M2
ECHO LVOT SV: 74.6 ML
ECHO LVOT VTI: 16.5 CM
ECHO MV A VELOCITY: 0.5 M/S
ECHO MV E DECELERATION TIME (DT): 187 MS
ECHO MV E VELOCITY: 0.91 M/S
ECHO MV E/A RATIO: 1.82
ECHO MV E/E' LATERAL: 10.11
ECHO MV E/E' RATIO (AVERAGED): 10.11
ECHO MV E/E' SEPTAL: 10.11
ECHO PV ACCELERATION TIME (AT): 127 MS
ECHO PV MAX VELOCITY: 0.8 M/S
ECHO PV PEAK GRADIENT: 2 MMHG
ECHO RV BASAL DIMENSION: 4.1 CM
ECHO RV INTERNAL DIMENSION: 3.3 CM
ECHO RV MID DIMENSION: 2.5 CM
ECHO RV TAPSE: 2 CM (ref 1.5–2)
EOSINOPHIL # BLD: 0.3 K/UL (ref 0–0.8)
EOSINOPHIL NFR BLD: 4 % (ref 0.5–7.8)
ERYTHROCYTE [DISTWIDTH] IN BLOOD BY AUTOMATED COUNT: 12.8 % (ref 11.9–14.6)
GLUCOSE SERPL-MCNC: 110 MG/DL (ref 65–100)
HCT VFR BLD AUTO: 41 % (ref 41.1–50.3)
HGB BLD-MCNC: 13.7 G/DL (ref 13.6–17.2)
IMM GRANULOCYTES # BLD AUTO: 0 K/UL (ref 0–0.5)
IMM GRANULOCYTES NFR BLD AUTO: 0 % (ref 0–5)
LYMPHOCYTES # BLD: 2.7 K/UL (ref 0.5–4.6)
LYMPHOCYTES NFR BLD: 35 % (ref 13–44)
MCH RBC QN AUTO: 30.4 PG (ref 26.1–32.9)
MCHC RBC AUTO-ENTMCNC: 33.4 G/DL (ref 31.4–35)
MCV RBC AUTO: 90.9 FL (ref 79.6–97.8)
MONOCYTES # BLD: 0.9 K/UL (ref 0.1–1.3)
MONOCYTES NFR BLD: 11 % (ref 4–12)
NEUTS SEG # BLD: 3.7 K/UL (ref 1.7–8.2)
NEUTS SEG NFR BLD: 49 % (ref 43–78)
NRBC # BLD: 0 K/UL (ref 0–0.2)
P-R INTERVAL, ECG05: 189 MS
PLATELET # BLD AUTO: 290 K/UL (ref 150–450)
PMV BLD AUTO: 10.1 FL (ref 9.4–12.3)
POTASSIUM SERPL-SCNC: 3.8 MMOL/L (ref 3.5–5.1)
Q-T INTERVAL, ECG07: 411 MS
QRS DURATION, ECG06: 169 MS
QTC CALCULATION (BEZET), ECG08: 480 MS
RBC # BLD AUTO: 4.51 M/UL (ref 4.23–5.6)
SODIUM SERPL-SCNC: 143 MMOL/L (ref 136–145)
TROPONIN-HIGH SENSITIVITY: 6.1 PG/ML (ref 0–14)
UFH PPP CHRO-ACNC: 0.42 IU/ML (ref 0.3–0.7)
UFH PPP CHRO-ACNC: 0.8 IU/ML (ref 0.3–0.7)
VENTRICULAR RATE, ECG03: 82 BPM
WBC # BLD AUTO: 7.6 K/UL (ref 4.3–11.1)

## 2022-01-28 PROCEDURE — 74011250637 HC RX REV CODE- 250/637: Performed by: FAMILY MEDICINE

## 2022-01-28 PROCEDURE — 74011250637 HC RX REV CODE- 250/637: Performed by: INTERNAL MEDICINE

## 2022-01-28 PROCEDURE — 80048 BASIC METABOLIC PNL TOTAL CA: CPT

## 2022-01-28 PROCEDURE — C8929 TTE W OR WO FOL WCON,DOPPLER: HCPCS

## 2022-01-28 PROCEDURE — 84484 ASSAY OF TROPONIN QUANT: CPT

## 2022-01-28 PROCEDURE — 74011250636 HC RX REV CODE- 250/636: Performed by: EMERGENCY MEDICINE

## 2022-01-28 PROCEDURE — 85025 COMPLETE CBC W/AUTO DIFF WBC: CPT

## 2022-01-28 PROCEDURE — 96366 THER/PROPH/DIAG IV INF ADDON: CPT

## 2022-01-28 PROCEDURE — 74011250636 HC RX REV CODE- 250/636: Performed by: FAMILY MEDICINE

## 2022-01-28 PROCEDURE — 96376 TX/PRO/DX INJ SAME DRUG ADON: CPT

## 2022-01-28 PROCEDURE — 96374 THER/PROPH/DIAG INJ IV PUSH: CPT

## 2022-01-28 PROCEDURE — 74011000250 HC RX REV CODE- 250: Performed by: FAMILY MEDICINE

## 2022-01-28 PROCEDURE — 74011250636 HC RX REV CODE- 250/636: Performed by: INTERNAL MEDICINE

## 2022-01-28 PROCEDURE — G0378 HOSPITAL OBSERVATION PER HR: HCPCS

## 2022-01-28 PROCEDURE — 85520 HEPARIN ASSAY: CPT

## 2022-01-28 RX ORDER — ACETAMINOPHEN 650 MG/1
650 SUPPOSITORY RECTAL
Status: DISCONTINUED | OUTPATIENT
Start: 2022-01-28 | End: 2022-01-28 | Stop reason: HOSPADM

## 2022-01-28 RX ORDER — NALBUPHINE HYDROCHLORIDE 10 MG/ML
5 INJECTION, SOLUTION INTRAMUSCULAR; INTRAVENOUS; SUBCUTANEOUS
Status: DISCONTINUED | OUTPATIENT
Start: 2022-01-28 | End: 2022-01-28 | Stop reason: HOSPADM

## 2022-01-28 RX ORDER — SODIUM CHLORIDE 0.9 % (FLUSH) 0.9 %
5-40 SYRINGE (ML) INJECTION AS NEEDED
Status: DISCONTINUED | OUTPATIENT
Start: 2022-01-28 | End: 2022-01-28 | Stop reason: HOSPADM

## 2022-01-28 RX ORDER — LANOLIN ALCOHOL/MO/W.PET/CERES
400 CREAM (GRAM) TOPICAL DAILY
Status: DISCONTINUED | OUTPATIENT
Start: 2022-01-28 | End: 2022-01-28

## 2022-01-28 RX ORDER — PANTOPRAZOLE SODIUM 40 MG/1
40 TABLET, DELAYED RELEASE ORAL
Status: DISCONTINUED | OUTPATIENT
Start: 2022-01-28 | End: 2022-01-28 | Stop reason: HOSPADM

## 2022-01-28 RX ORDER — ONDANSETRON 2 MG/ML
4 INJECTION INTRAMUSCULAR; INTRAVENOUS
Status: DISCONTINUED | OUTPATIENT
Start: 2022-01-28 | End: 2022-01-28 | Stop reason: HOSPADM

## 2022-01-28 RX ORDER — ONDANSETRON 4 MG/1
4 TABLET, ORALLY DISINTEGRATING ORAL
Status: DISCONTINUED | OUTPATIENT
Start: 2022-01-28 | End: 2022-01-28 | Stop reason: HOSPADM

## 2022-01-28 RX ORDER — POLYETHYLENE GLYCOL 3350 17 G/17G
17 POWDER, FOR SOLUTION ORAL DAILY PRN
Status: DISCONTINUED | OUTPATIENT
Start: 2022-01-28 | End: 2022-01-28 | Stop reason: HOSPADM

## 2022-01-28 RX ORDER — NALBUPHINE HYDROCHLORIDE 10 MG/ML
5 INJECTION, SOLUTION INTRAMUSCULAR; INTRAVENOUS; SUBCUTANEOUS ONCE
Status: COMPLETED | OUTPATIENT
Start: 2022-01-28 | End: 2022-01-28

## 2022-01-28 RX ORDER — ACETAMINOPHEN 325 MG/1
650 TABLET ORAL
Status: DISCONTINUED | OUTPATIENT
Start: 2022-01-28 | End: 2022-01-28 | Stop reason: HOSPADM

## 2022-01-28 RX ORDER — SODIUM CHLORIDE 0.9 % (FLUSH) 0.9 %
5-40 SYRINGE (ML) INJECTION EVERY 8 HOURS
Status: DISCONTINUED | OUTPATIENT
Start: 2022-01-28 | End: 2022-01-28 | Stop reason: HOSPADM

## 2022-01-28 RX ADMIN — APIXABAN 10 MG: 5 TABLET, FILM COATED ORAL at 17:05

## 2022-01-28 RX ADMIN — ACETAMINOPHEN 650 MG: 325 TABLET ORAL at 08:04

## 2022-01-28 RX ADMIN — PERFLUTREN 1 ML: 6.52 INJECTION, SUSPENSION INTRAVENOUS at 09:45

## 2022-01-28 RX ADMIN — NALBUPHINE HYDROCHLORIDE 5 MG: 10 INJECTION, SOLUTION INTRAMUSCULAR; INTRAVENOUS; SUBCUTANEOUS at 10:48

## 2022-01-28 RX ADMIN — NALBUPHINE HYDROCHLORIDE 5 MG: 10 INJECTION, SOLUTION INTRAMUSCULAR; INTRAVENOUS; SUBCUTANEOUS at 15:26

## 2022-01-28 RX ADMIN — HYDROCHLOROTHIAZIDE: 12.5 CAPSULE ORAL at 08:04

## 2022-01-28 RX ADMIN — PANTOPRAZOLE SODIUM 40 MG: 40 TABLET, DELAYED RELEASE ORAL at 07:30

## 2022-01-28 RX ADMIN — HEPARIN SODIUM 17 UNITS/KG/HR: 5000 INJECTION, SOLUTION INTRAVENOUS at 14:04

## 2022-01-28 NOTE — DISCHARGE SUMMARY
Hospitalist Discharge Summary   Admit Date:  2022  9:22 PM   DC Note date: 2022  Name:  Adolfo Garnica   Age:  59 y.o. Sex:  male  :  1960   MRN:  [id number]   Room:  ER14/  PCP:  Tri Lugo DO    Presenting Complaint: Blood Clot (PE)    Initial Admission Diagnosis: Pulmonary embolism ([address] [I26.99]     Problem List for this Hospitalization:  Hospital Problems as of 2022 Date Reviewed: 2022          Codes Class Noted - Resolved POA    * (Principal) Pulmonary embolism ([address] ICD-10-CM: I26.99  ICD-9-CM: 415.19  2022 - Present Unknown        Sarcoidosis ICD-10-CM: D86.9  ICD-9-CM: 135  Unknown - Present Yes        A-fib ([address] ICD-10-CM: I48.91  ICD-9-CM: 427.31  Unknown - Present Yes        Hypertension ICD-10-CM: I10  ICD-9-CM: 401.9  2022 - Present Yes    Overview Signed 2022 11:17 AM by Tri Lugo DO     Patients blood pressure well controlled. Placed on antihypertensives for peripheral neuropathy and leg edema. Transition from Triamterene likely in the future. Did Patient have Sepsis (YES OR NO):No        Hospital Course:  Adolfo Garnica is a 59 y.o. male with medical history of HTN, sarcoidosis who presented to ED with chest discomfort. He reports symptoms started about 2 weeks ago. At that time, he was diagnosed with pneumonia and started on antibiotics. He states that his symptoms improved, but the pain returned about 2 days ago. He saw his PCP who checked a d-dimer and found it elevated. An outpatient CT chest was performed. He was called and told to come to the ER due to abnormal CT. CT shows evidence of B PEs. Upon ER evaluation, patient is not hypoxic. He has a history of afib, but has not been on anticoagulation after ablation about 3 years ago. Hospitalist asked to admit.     Mr. Refugio Zurita was admitted and started on heparin drip.   He had a 2D echo that showed no evidence of cardiac strain  Assessment & Plan:   * Pulmonary embolism (HCC)  - B PEs on CT chest  - Started on heparin drip in ER  -Echo showed no evidence of cardiac strain  -We will transition to Eliquis for discharge  -He had a walk test prior to discharge and did not status below 95% per nursing. Hypertension  - Continue home meds     A-fib ([facility]  - Rate controlled  - Not on medications s/p ablation 3 years ago     Sarcoidosis  - Of note        Disposition:  Home  Disposition: Home or Self Care  Diet: ADULT DIET Regular  Code Status: Full Code    Follow Up Orders: Follow-up Appointments   Procedures    FOLLOW UP VISIT Appointment in: One Week     Standing Status:   Standing     Number of Occurrences:   1     Order Specific Question:   Appointment in     Answer: One Week       Follow-up Information     Follow up With Specialties Details Why Contact Info    Rustam Hyde, DO Internal Medicine In 1 week  [address]  [phone]            Follow up labs/diagnostics (ultimately defer to outpatient provider):  None    Time spent in patient discharge and coordination 38 minutes. Plan was discussed with the patient and IDT. All questions answered. Patient was stable at time of discharge. Instructions given to call a physician or return if any concerns. Discharge Info:   Current Discharge Medication List      START taking these medications    Details   apixaban (ELIQUIS) 5 mg tablet 2 tabs po q12h x 13 doses (12 tabs)  then 1 tab po daily q12h 21 days (42 tabs)  Indications: a clot in the lung  Qty: 54 Tablet, Refills: 0  Start date: 1/28/2022         CONTINUE these medications which have NOT CHANGED    Details   glucosam/jann-msm1/C/saloni/bosw (OSTEO BI-FLEX TRIPLE STRENGTH PO) Take  by mouth two (2) times a day. folic acid 782 mcg tablet Take 400 mcg by mouth daily. esomeprazole (NEXIUM) 40 mg capsule Take 1 Capsule by mouth daily.   Qty: 90 Capsule, Refills: 3    Associated Diagnoses: Atrial fibrillation, unspecified type ([facility] losartan-hydroCHLOROthiazide (HYZAAR) 50-12.5 mg per tablet Take 1 Tablet by mouth daily. Qty: 30 Tablet, Refills: 1    Associated Diagnoses: Hypertension, unspecified type             Procedures done this admission:  * No surgery found *    Consults this admission:  None    Echocardiogram/EKG results:  Results from Hospital Encounter encounter on 01/27/22    ECHO ADULT COMPLETE    Interpretation Summary    Left Ventricle: Left ventricle size is normal. Normal wall thickness. Normal wall motion. The EF by visual approximation is 60 - 65%. Normal diastolic function.   Right Ventricle: Right ventricle size is normal. Normal systolic function.   Aorta: Mildly dilated annulus.   Technical qualifiers: Echo study was technically difficult.   Contrast used: Definity. EKG Results     Procedure 720 Value Units Date/Time    EKG, 12 LEAD, INITIAL [[id number]] Collected: 01/27/22 2200    Order Status: Completed Updated: 01/28/22 0655     Ventricular Rate 82 BPM      Atrial Rate 82 BPM      P-R Interval 189 ms      QRS Duration 169 ms      Q-T Interval 411 ms      QTC Calculation (Bezet) 480 ms      Calculated P Axis 24 degrees      Calculated R Axis -68 degrees      Calculated T Axis 4 degrees      Diagnosis --     Sinus rhythm  RBBB and LAFB    Confirmed by Jovita Bautista ([id number]) on 1/28/2022 6:55:30 AM            Diagnostic Imaging/Tests:   ECHO ADULT COMPLETE    Result Date: 1/28/2022    Left Ventricle: Left ventricle size is normal. Normal wall thickness. Normal wall motion. The EF by visual approximation is 60 - 65%. Normal diastolic function.   Right Ventricle: Right ventricle size is normal. Normal systolic function.   Aorta: Mildly dilated annulus.   Technical qualifiers: Echo study was technically difficult.   Contrast used: Definity.        All Micro Results     None          Labs: Results:       BMP, Mg, Phos Recent Labs     01/28/22  0457 01/27/22  2150 01/26/22  1143    142 144   K 3.8 3.5 4.5   * 110* 105   CO2 27 27 25   AGAP 6* 5*  --    BUN 11 11 10   CREA 0.74* 0.96 0.73*   CA 8.6 9.1 9.1   * 125* 96   MG  --  2.3  --       CBC Recent Labs     01/28/22  0457 01/27/22 2150 01/26/22  1143   WBC 7.6 9.4 8.9   RBC 4.51 4.84 4.99   HGB 13.7 14.5 15.0   HCT 41.0* 43.3 43.9    315 330   GRANS 49 58 52   LYMPH 35 29  --    EOS 4 3 2   MONOS 11 10 13   BASOS 1 0 0   IG 0 1 1   ANEU 3.7 5.4 4.7   ABL 2.7 2.7  --    FRANCISCA 0.3 0.3 0.2   ABM 0.9 1.0 1.1*   ABB 0.0 0.0 0.0   AIG 0.0 0.1 0.1      LFT Recent Labs     01/27/22 2150 01/26/22  1143   ALT 26 17    113   TP 7.1 6.3   ALB 3.4 4.4   GLOB 3.7*  --    AGRAT 0.9* 2.3*      Cardiac Testing No results found for: BNPP, BNP, CPK, RCK1, RCK2, RCK3, RCK4, CKMB, CKNDX, CKND1, TROPT, TROIQ   Coagulation Tests Lab Results   Component Value Date/Time    Prothrombin time 13.3 01/27/2022 09:50 PM    INR 1.0 01/27/2022 09:50 PM    aPTT 30.1 01/27/2022 09:50 PM      A1c Lab Results   Component Value Date/Time    Hemoglobin A1c 6.2 (H) 01/05/2022 12:07 PM      Lipid Panel No results found for: CHOL, CHOLPOCT, CHOLX, CHLST, CHOLV, 036373, HDL, HDLP, LDL, LDLC, DLDLP, [id number], VLDLC, VLDL, TGLX, TRIGL, TRIGP, TGLPOCT, CHHD, CHHDX   Thyroid Panel No results found for: TSH, T4, FT4, TT3, T3U, TSHEXT     Most Recent UA No results found for: COLOR, APPRN, REFSG, JUDY, PROTU, GLUCU, KETU, BILU, BLDU, UROU, VIKTORIA, LEUKU, WBCU, RBCU, UEPI, BACTU, CASTS, UCRY, MUCUS, UCOM       All Labs from Last 24 Hrs:  Recent Results (from the past 24 hour(s))   METABOLIC PANEL, COMPREHENSIVE    Collection Time: 01/27/22  9:50 PM   Result Value Ref Range    Sodium 142 138 - 145 mmol/L    Potassium 3.5 3.5 - 5.1 mmol/L    Chloride 110 (H) 98 - 107 mmol/L    CO2 27 21 - 32 mmol/L    Anion gap 5 (L) 7 - 16 mmol/L    Glucose 125 (H) 65 - 100 mg/dL    BUN 11 8 - 23 MG/DL    Creatinine 0.96 0.8 - 1.5 MG/DL    GFR est AA >60 >60 ml/min/1.73m2    GFR est non-AA >60 >60 ml/min/1.73m2    Calcium 9.1 8.3 - 10.4 MG/DL    Bilirubin, total 0.3 0.2 - 1.1 MG/DL    ALT (SGPT) 26 12 - 65 U/L    AST (SGOT) 18 15 - 37 U/L    Alk. phosphatase 105 50 - 136 U/L    Protein, total 7.1 6.3 - 8.2 g/dL    Albumin 3.4 3.2 - 4.6 g/dL    Globulin 3.7 (H) 2.3 - 3.5 g/dL    A-G Ratio 0.9 (L) 1.2 - 3.5     LIPASE    Collection Time: 01/27/22  9:50 PM   Result Value Ref Range    Lipase 102 73 - 393 U/L   MAGNESIUM    Collection Time: 01/27/22  9:50 PM   Result Value Ref Range    Magnesium 2.3 1.8 - 2.4 mg/dL   TROPONIN-HIGH SENSITIVITY    Collection Time: 01/27/22  9:50 PM   Result Value Ref Range    Troponin-High Sensitivity 5.1 0 - 14 pg/mL   NT-PRO BNP    Collection Time: 01/27/22  9:50 PM   Result Value Ref Range    NT pro-BNP 45 5 - 125 PG/ML   PROTHROMBIN TIME + INR    Collection Time: 01/27/22  9:50 PM   Result Value Ref Range    Prothrombin time 13.3 12.6 - 14.5 sec    INR 1.0     PTT    Collection Time: 01/27/22  9:50 PM   Result Value Ref Range    aPTT 30.1 24.1 - 35.1 SEC   CBC WITH AUTOMATED DIFF    Collection Time: 01/27/22  9:50 PM   Result Value Ref Range    WBC 9.4 4.3 - 11.1 K/uL    RBC 4.84 4.23 - 5.6 M/uL    HGB 14.5 13.6 - 17.2 g/dL    HCT 43.3 41.1 - 50.3 %    MCV 89.5 79.6 - 97.8 FL    MCH 30.0 26.1 - 32.9 PG    MCHC 33.5 31.4 - 35.0 g/dL    RDW 12.7 11.9 - 14.6 %    PLATELET 269 051 - 648 K/uL    MPV 10.0 9.4 - 12.3 FL    ABSOLUTE NRBC 0.00 0.0 - 0.2 K/uL    DF AUTOMATED      NEUTROPHILS 58 43 - 78 %    LYMPHOCYTES 29 13 - 44 %    MONOCYTES 10 4.0 - 12.0 %    EOSINOPHILS 3 0.5 - 7.8 %    BASOPHILS 0 0.0 - 2.0 %    IMMATURE GRANULOCYTES 1 0.0 - 5.0 %    ABS. NEUTROPHILS 5.4 1.7 - 8.2 K/UL    ABS. LYMPHOCYTES 2.7 0.5 - 4.6 K/UL    ABS. MONOCYTES 1.0 0.1 - 1.3 K/UL    ABS. EOSINOPHILS 0.3 0.0 - 0.8 K/UL    ABS. BASOPHILS 0.0 0.0 - 0.2 K/UL    ABS. IMM.  GRANS. 0.1 0.0 - 0.5 K/UL   EKG, 12 LEAD, INITIAL    Collection Time: 01/27/22 10:00 PM   Result Value Ref Range    Ventricular Rate 82 BPM    Atrial Rate 82 BPM    P-R Interval 189 ms    QRS Duration 169 ms    Q-T Interval 411 ms    QTC Calculation (Bezet) 480 ms    Calculated P Axis 24 degrees    Calculated R Axis -68 degrees    Calculated T Axis 4 degrees    Diagnosis       Sinus rhythm  RBBB and LAFB    Confirmed by Dontrell Slaughter ([id number]) on 1/28/2022 6:55:30 AM     TROPONIN-HIGH SENSITIVITY    Collection Time: 01/28/22 12:59 AM   Result Value Ref Range    Troponin-High Sensitivity 6.1 0 - 14 pg/mL   CBC WITH AUTOMATED DIFF    Collection Time: 01/28/22  4:57 AM   Result Value Ref Range    WBC 7.6 4.3 - 11.1 K/uL    RBC 4.51 4.23 - 5.6 M/uL    HGB 13.7 13.6 - 17.2 g/dL    HCT 41.0 (L) 41.1 - 50.3 %    MCV 90.9 79.6 - 97.8 FL    MCH 30.4 26.1 - 32.9 PG    MCHC 33.4 31.4 - 35.0 g/dL    RDW 12.8 11.9 - 14.6 %    PLATELET 881 866 - 297 K/uL    MPV 10.1 9.4 - 12.3 FL    ABSOLUTE NRBC 0.00 0.0 - 0.2 K/uL    DF AUTOMATED      NEUTROPHILS 49 43 - 78 %    LYMPHOCYTES 35 13 - 44 %    MONOCYTES 11 4.0 - 12.0 %    EOSINOPHILS 4 0.5 - 7.8 %    BASOPHILS 1 0.0 - 2.0 %    IMMATURE GRANULOCYTES 0 0.0 - 5.0 %    ABS. NEUTROPHILS 3.7 1.7 - 8.2 K/UL    ABS. LYMPHOCYTES 2.7 0.5 - 4.6 K/UL    ABS. MONOCYTES 0.9 0.1 - 1.3 K/UL    ABS. EOSINOPHILS 0.3 0.0 - 0.8 K/UL    ABS. BASOPHILS 0.0 0.0 - 0.2 K/UL    ABS. IMM.  GRANS. 0.0 0.0 - 0.5 K/UL   METABOLIC PANEL, BASIC    Collection Time: 01/28/22  4:57 AM   Result Value Ref Range    Sodium 143 136 - 145 mmol/L    Potassium 3.8 3.5 - 5.1 mmol/L    Chloride 110 (H) 98 - 107 mmol/L    CO2 27 21 - 32 mmol/L    Anion gap 6 (L) 7 - 16 mmol/L    Glucose 110 (H) 65 - 100 mg/dL    BUN 11 8 - 23 MG/DL    Creatinine 0.74 (L) 0.8 - 1.5 MG/DL    GFR est AA >60 >60 ml/min/1.73m2    GFR est non-AA >60 >60 ml/min/1.73m2    Calcium 8.6 8.3 - 10.4 MG/DL   HEPARIN XA UFH    Collection Time: 01/28/22  4:57 AM   Result Value Ref Range    Heparin Xa UFH 0.80 (H) 0.3 - 0.7 IU/mL   ECHO ADULT COMPLETE    Collection Time: 01/28/22 10:22 AM Result Value Ref Range    LV EDV A2C 127 mL    LV EDV A4C 121 mL    LV EDV  67 - 155 mL    LV ESV A2C 34 mL    LV ESV A4C 42 mL    IVSd 1.0 0.6 - 1.0 cm    LVIDd 5.7 4.2 - 5.9 cm    LVIDs 3.7 cm    LVOT Diameter 2.4 cm    LVOT Mean Gradient 1 mmHg    LVOT VTI 16.5 cm    LVOT Peak Velocity 0.7 m/s    LVOT Peak Gradient 2 mmHg    LVPWd 1.0 0.6 - 1.0 cm    LV E' Lateral Velocity 9 cm/s    LV E' Septal Velocity 9 cm/s    LV Ejection Fraction A2C 73 %    LV Ejection Fraction A4C 66 %    EF BP 70 55 - 100 %    LVOT Area 4.5 cm2    LVOT SV 74.6 ml    LA Minor Axis 5.9 cm    LA Major [place] 5.2 cm    LA Area 2C 20.7 cm2    LA Area 4C 15.9 cm2    LA Volume BP 49 18 - 58 mL    LA Diameter 4.1 cm    AV Mean Velocity 0.8 m/s    AV Mean Gradient 3 mmHg    AV VTI 26.0 cm    AV Peak Velocity 1.1 m/s    AV Peak Gradient 4 mmHg    AV Area by VTI 2.9 cm2    AV Area by Peak Velocity 3.1 cm2    Aortic Root 3.8 cm    Ascending Aorta 3.5 cm    IVC Proxmal 2.0 cm    MV E Wave Deceleration Time 187.0 ms    MV A Velocity 0.50 m/s    MV E Velocity 0.91 m/s    PV .0 ms    PV Max Velocity 0.8 m/s    PV Peak Gradient 2 mmHg    RVIDd 3.3 cm    RV Basal Dimension 4.1 cm    RV Mid Dimension 2.5 cm    TAPSE 2.0 1.5 - 2.0 cm    Fractional Shortening 2D 35 28 - 44 %    LV EDV Index BP 53 mL/m2    LV ESV Index A4C 17 mL/m2    LV EDV Index A4C 50 mL/m2    LV ESV Index A2C 14 mL/m2    LV EDV Index A2C 53 mL/m2    LVIDd Index 2.37 cm/m2    LVIDs Index 1.54 cm/m2    LV RWT Ratio 0.35     LV Mass 2D 226.4 (A) 88 - 224 g    LV Mass 2D Index 93.9 49 - 115 g/m2    MV E/A 1.82     E/E' Ratio (Averaged) 10.11     E/E' Lateral 10.11     E/E' Septal 10.11     LA Volume Index BP 20 16 - 34 ml/m2    LVOT Stroke Volume Index 31.0 mL/m2    LA Size Index 1.70 cm/m2    LA/AO Root Ratio 1.08     Ao Root Index 1.58 cm/m2    Ascending Aorta Index 1.45 cm/m2    AV Velocity Ratio 0.64     LVOT:AV VTI Index 0.63     VIDHI/BSA VTI 1.2 cm2/m2    VIDHI/BSA Peak Velocity 1.3 cm2/m2    Est. RA Pressure 3 mmHg   HEPARIN XA UFH    Collection Time: 01/28/22 12:12 PM   Result Value Ref Range    Heparin Xa UFH 0.42 0.3 - 0.7 IU/mL       Current Med List in Hospital:   Current Facility-Administered Medications   Medication Dose Route Frequency    pantoprazole (PROTONIX) tablet 40 mg  40 mg Oral ACB    sodium chloride (NS) flush 5-40 mL  5-40 mL IntraVENous Q8H    sodium chloride (NS) flush 5-40 mL  5-40 mL IntraVENous PRN    acetaminophen (TYLENOL) tablet 650 mg  650 mg Oral Q6H PRN    Or    acetaminophen (TYLENOL) suppository 650 mg  650 mg Rectal Q6H PRN    polyethylene glycol (MIRALAX) packet 17 g  17 g Oral DAILY PRN    ondansetron (ZOFRAN ODT) tablet 4 mg  4 mg Oral Q8H PRN    Or    ondansetron (ZOFRAN) injection 4 mg  4 mg IntraVENous Q6H PRN    losartan/hydroCHLOROthiazide (HYZAAR) 50/12.5 mg   Oral DAILY    nalbuphine (NUBAIN) injection 5 mg  5 mg IntraVENous Q6H PRN    apixaban (ELIQUIS) tablet 10 mg  10 mg Oral ONCE    heparin 25,000 units in dextrose 500 mL infusion  18-36 Units/kg/hr (Adjusted) IntraVENous TITRATE     Current Outpatient Medications   Medication Sig    apixaban (ELIQUIS) 5 mg tablet 2 tabs po q12h x 13 doses (12 tabs)  then 1 tab po daily q12h 21 days (42 tabs)  Indications: a clot in the lung    glucosam/jann-msm1/C/saloni/bosw (OSTEO BI-FLEX TRIPLE STRENGTH PO) Take  by mouth two (2) times a day.  folic acid 312 mcg tablet Take 400 mcg by mouth daily.  esomeprazole (NEXIUM) 40 mg capsule Take 1 Capsule by mouth daily.  losartan-hydroCHLOROthiazide (HYZAAR) 50-12.5 mg per tablet Take 1 Tablet by mouth daily.        Allergies   Allergen Reactions    Amoxicillin Rash     Immunization History   Administered Date(s) Administered    Tdap 01/05/2022       Recent Vital Data:  Patient Vitals for the past 24 hrs:   Temp Pulse Resp BP SpO2   01/28/22 1348  69 16  93 %   01/28/22 1218  66 14  93 %   01/28/22 1118  65 15  95 %   01/28/22 1053  64 16 121/84 95 %   01/28/22 0804  67  119/80    01/28/22 0650  69   92 %   01/28/22 0550  64   96 %   01/28/22 0450  63   95 %   01/28/22 0350  69   92 %   01/27/22 1841 99.1 °F (37.3 °C)       01/27/22 1839  87 18 (!) 151/94 97 %     Oxygen Therapy  O2 Sat (%): 93 % (01/28/22 1348)  Pulse via Oximetry: 68 beats per minute (01/28/22 1348)  O2 Device: None (Room air) (01/28/22 1050)    Estimated body mass index is 34.3 kg/m² as calculated from the following:    Height as of this encounter: 6' 1\" (1.854 m). Weight as of this encounter: 117.9 kg (260 lb). No intake or output data in the 24 hours ending 01/28/22 1629      Physical Exam:    General:    Well nourished. No overt distress  Head:  Normocephalic, atraumatic  Eyes:  Sclerae appear normal.  Pupils equally round. HENT:  Nares appear normal, no drainage. Moist mucous membranes  Neck:  No restricted ROM. Trachea midline  CV:   RRR. No m/r/g. No JVD  Lungs:   Air entry equal bilaterally. no wheezing, rhonchi, or rales. Even, unlabored  Abdomen:   Soft, nontender, nondistended. Extremities: Warm and dry. No cyanosis or clubbing. No edema. Skin:     No rashes. Normal coloration  Neuro:  CN II-XII grossly intact. Psych:  Normal mood and affect. Signed:  Alina Haynes MD    Part of this note may have been written by using a voice dictation software. The note has been proof read but may still contain some grammatical/other typographical errors.

## 2022-01-28 NOTE — H&P
Hospitalist History and Physical   Admit Date:  2022  9:22 PM   Name:  Yovani Morton   Age:  59 y.o. Sex:  male  :  1960   MRN:  [id number]     Presenting Complaint: chest pain  Reason(s) for Admission: Pulmonary embolism ([address] [I26.99]     History of Present Illness:   Yovani Morton is a 59 y.o. male with medical history of HTN, sarcoidosis who presented to ED with chest discomfort. He reports symptoms started about 2 weeks ago. At that time, he was diagnosed with pneumonia and started on antibiotics. He states that his symptoms improved, but the pain returned about 2 days ago. He saw his PCP who checked a d-dimer and found it elevated. An outpatient CT chest was performed. He was called and told to come to the ER due to abnormal CT. CT shows evidence of B PEs. Upon ER evaluation, patient is not hypoxic. He has a history of afib, but has not been on anticoagulation after ablation about 3 years ago. Hospitalist asked to admit. Review of Systems:  10 systems reviewed and negative except as noted in HPI. Assessment & Plan:   * Pulmonary embolism (HCC)  - B PEs on CT chest  - Started on heparin drip in ER  - Will check echo to evaluate for heart strain  - Not hypoxic, likely able to transition to oral AC soon    Hypertension  - Continue home meds    A-fib ([address]  - Rate controlled  - Not on medications s/p ablation 3 years ago    Sarcoidosis  - Of note       Disposition: observation    Past medical history reviewed. Past Medical History:   Diagnosis Date    A-fib ([address]     BPH (benign prostatic hyperplasia)     Personal history of prostate cancer     Sarcoidosis      Past surgical history reviewed.     Past Surgical History:   Procedure Laterality Date    HX ORTHOPAEDIC      3 knee surgeries       Allergies   Allergen Reactions    Amoxicillin Rash      Social History     Tobacco Use    Smoking status: Former Smoker    Smokeless tobacco: Never Used   Substance Use Topics  Alcohol use: Yes     Alcohol/week: 3.0 standard drinks     Types: 3 Glasses of wine per week         Family history reviewed and noncontributory to patient's acute condition; no relevant family history unless otherwise noted above. Immunization History   Administered Date(s) Administered    Tdap 01/05/2022     PTA Medications:  Current Outpatient Medications   Medication Instructions    esomeprazole (NEXIUM) 40 mg, Oral, DAILY    folic acid 161 mcg, Oral, DAILY    glucosam/jann-msm1/C/saloni/bosw (OSTEO BI-FLEX TRIPLE STRENGTH PO) Oral, 2 TIMES DAILY    losartan-hydroCHLOROthiazide (HYZAAR) 50-12.5 mg per tablet 1 Tablet, Oral, DAILY       Objective:     Patient Vitals for the past 24 hrs:   Temp Pulse Resp BP SpO2   01/27/22 1841 99.1 °F (37.3 °C)       01/27/22 1839  87 18 (!) 151/94 97 %     Oxygen Therapy  O2 Sat (%): 97 % (01/27/22 1839)  O2 Device: None (Room air) (01/27/22 1839)    Estimated body mass index is 34.3 kg/m² as calculated from the following:    Height as of this encounter: 6' 1\" (1.854 m). Weight as of this encounter: 117.9 kg (260 lb). No intake or output data in the 24 hours ending 01/28/22 0019      Physical Exam:  General:    Well nourished. No overt distress  Head:  Normocephalic, atraumatic  Eyes:  Sclerae appear normal.  Pupils equally round. HENT:  Nares appear normal, no drainage. Moist mucous membranes  Neck:  No restricted ROM. Trachea midline  CV:   RRR. S1/S2 auscultated  Lungs:   CTAB. No wheezing, rhonchi, or rales. Appears even, unlabored  Abdomen: Bowel sounds present. Soft, nontender, nondistended. Extremities: Warm and dry. No cyanosis or clubbing. No edema. Skin:     No rashes. Normal turgor. Normal coloration  Neuro:  Cranial nerves II-XII grossly intact. Sensation intact  Psych:  Normal mood and affect.   Alert and oriented x3    Data Ordered and Personally Reviewed:    Last 24hr Labs:  Recent Results (from the past 24 hour(s)) METABOLIC PANEL, COMPREHENSIVE    Collection Time: 01/27/22  9:50 PM   Result Value Ref Range    Sodium 142 138 - 145 mmol/L    Potassium 3.5 3.5 - 5.1 mmol/L    Chloride 110 (H) 98 - 107 mmol/L    CO2 27 21 - 32 mmol/L    Anion gap 5 (L) 7 - 16 mmol/L    Glucose 125 (H) 65 - 100 mg/dL    BUN 11 8 - 23 MG/DL    Creatinine 0.96 0.8 - 1.5 MG/DL    GFR est AA >60 >60 ml/min/1.73m2    GFR est non-AA >60 >60 ml/min/1.73m2    Calcium 9.1 8.3 - 10.4 MG/DL    Bilirubin, total 0.3 0.2 - 1.1 MG/DL    ALT (SGPT) 26 12 - 65 U/L    AST (SGOT) 18 15 - 37 U/L    Alk.  phosphatase 105 50 - 136 U/L    Protein, total 7.1 6.3 - 8.2 g/dL    Albumin 3.4 3.2 - 4.6 g/dL    Globulin 3.7 (H) 2.3 - 3.5 g/dL    A-G Ratio 0.9 (L) 1.2 - 3.5     LIPASE    Collection Time: 01/27/22  9:50 PM   Result Value Ref Range    Lipase 102 73 - 393 U/L   MAGNESIUM    Collection Time: 01/27/22  9:50 PM   Result Value Ref Range    Magnesium 2.3 1.8 - 2.4 mg/dL   TROPONIN-HIGH SENSITIVITY    Collection Time: 01/27/22  9:50 PM   Result Value Ref Range    Troponin-High Sensitivity 5.1 0 - 14 pg/mL   NT-PRO BNP    Collection Time: 01/27/22  9:50 PM   Result Value Ref Range    NT pro-BNP 45 5 - 125 PG/ML   PROTHROMBIN TIME + INR    Collection Time: 01/27/22  9:50 PM   Result Value Ref Range    Prothrombin time 13.3 12.6 - 14.5 sec    INR 1.0     PTT    Collection Time: 01/27/22  9:50 PM   Result Value Ref Range    aPTT 30.1 24.1 - 35.1 SEC   CBC WITH AUTOMATED DIFF    Collection Time: 01/27/22  9:50 PM   Result Value Ref Range    WBC 9.4 4.3 - 11.1 K/uL    RBC 4.84 4.23 - 5.6 M/uL    HGB 14.5 13.6 - 17.2 g/dL    HCT 43.3 41.1 - 50.3 %    MCV 89.5 79.6 - 97.8 FL    MCH 30.0 26.1 - 32.9 PG    MCHC 33.5 31.4 - 35.0 g/dL    RDW 12.7 11.9 - 14.6 %    PLATELET 049 814 - 028 K/uL    MPV 10.0 9.4 - 12.3 FL    ABSOLUTE NRBC 0.00 0.0 - 0.2 K/uL    DF AUTOMATED      NEUTROPHILS 58 43 - 78 %    LYMPHOCYTES 29 13 - 44 %    MONOCYTES 10 4.0 - 12.0 %    EOSINOPHILS 3 0.5 - 7.8 %    BASOPHILS 0 0.0 - 2.0 %    IMMATURE GRANULOCYTES 1 0.0 - 5.0 %    ABS. NEUTROPHILS 5.4 1.7 - 8.2 K/UL    ABS. LYMPHOCYTES 2.7 0.5 - 4.6 K/UL    ABS. MONOCYTES 1.0 0.1 - 1.3 K/UL    ABS. EOSINOPHILS 0.3 0.0 - 0.8 K/UL    ABS. BASOPHILS 0.0 0.0 - 0.2 K/UL    ABS. IMM. GRANS. 0.1 0.0 - 0.5 K/UL       All Micro Results     None          Other Studies:  CT HEAD WO CONT    Result Date: 1/27/2022  EXAM: CT HEAD WO CONT HISTORY:  Severe headache, on heparin. TECHNIQUE: Axial images of the brain were performed without the administration of intravenous contrast. Images were obtained axial plane and coronal reformatted images were submitted. Dose reduction technique used: Automated exposure control/Adjustment of the mA and/or kV according to patient size/Use of iterative reconstruction technique. COMPARISON: None. FINDINGS: There is no evidence of acute intracranial hemorrhage, midline shift, or mass effect. No intra or extra-axial fluid collections are observed. No evidence of acute confluent territorial infarction. Ventricles and basal cisterns are patent and symmetric. Visualized paranasal sinuses and mastoid air cells are without significant fluid. Scalp, soft tissues, and calvarium are within normal limits. 1. No CT evidence of acute intracranial process. CT CHEST PULMONARY EMBOLISM    Result Date: 1/27/2022  CT OF THE CHEST WITH INTRAVENOUS CONTRAST, 1/27/2022 Indication: Right-sided chest pain, elevated d-dimer, and fever. Comparison: None Technique:   2.5 mm axial scans from above the aortic arch to the lung bases following the uneventful administration of 70 mL of Isovue-370. Intravenous contrast was given to evaluate for pulmonary embolism. All CT scans performed at this facility use one or all of the following: Automated exposure control, adjustment of the mA and/or kVp according to patient's size, iterative reconstruction. Findings: The base of the neck is unremarkable in appearance.   No axillary, mediastinal, or hilar lymphadenopathy is seen. A calcified prevascular mediastinal lymph node is seen likely representing benign sequela of chronic granulomatous infection. The thoracic aorta is normal in caliber. Opacification of the pulmonary arteries is good. Abnormal filling defects are seen within pulmonary arteries extending to the left upper lobe and bilateral lower lobes consistent with pulmonary emboli. Evaluation with lung windows demonstrates no clear acute infiltrate. Irregular densities are seen at the left lung base favored to represent areas of scarring or atelectasis. No clear pulmonary embolus extends to these portions of lungs to suggest pulmonary infarctions. No pleural effusion is seen. Lungs are expanded without evidence for pneumothorax. No acute osseous abnormality is seen. Limited evaluation of the upper abdomen demonstrates no acute abnormality. 1.  Abnormal filling defects within pulmonary arteries extending to the left upper lobe and bilateral lower lobes consistent with pulmonary emboli. 2. No significant acute appearing infiltrate. Only irregular densities are seen at the right lung base favored to represent areas of scarring or atelectasis. This report was made using voice transcription. Despite my best efforts to avoid any, transcription errors may persist. If there is any question about the accuracy of the report or need for clarification, then please call [phone], or text me through [employer] for clarification or correction. These findings were discussed with Dr. Jose Pat by myself personally. This on-call physician was initially paged at 5:18 PM with subsequent communication at 5:43 PM on 1/27/2022.         Medications Administered     acetaminophen (TYLENOL) tablet 1,000 mg     Admin Date  01/27/2022 Action  Given Dose  1,000 mg Route  Oral Administered By  Martha Ceballos RN          heparin (porcine) 1,000 unit/mL injection 7,610 Units     Admin Date  01/27/2022 Action  Given Dose  7,610 Units Route  IntraVENous Administered By  Tsering Muse RN          heparin 25,000 units in dextrose 500 mL infusion     Admin Date  01/27/2022 Action  New Bag Dose  18 Units/kg/hr Rate  34.2 mL/hr Route  IntraVENous Administered By  Tsering Muse RN                  Signed:  Fuad Wade MD

## 2022-01-28 NOTE — ED NOTES
Patient developed severe headache after starting heparin. Given Tylenol without improvement. Headache worsened. Heparin held and CT head ordered. Given Nubain. Hospitalist updated.

## 2022-01-28 NOTE — ACP (ADVANCE CARE PLANNING)
Advance Care Planning   Healthcare Decision Maker:       Primary Decision Maker: Donell Peck - [facility] - [phone]    Click here to complete Healthcare Decision Makers including selection of the Healthcare Decision Maker Relationship (ie \"Primary\")  Today we documented Decision Maker(s) consistent with Legal Next of Kin hierarchy.

## 2022-01-28 NOTE — PROGRESS NOTES
Chart review complete, CM met with pt at bedside, pt found sitting up in bed alert and oriented x4, pt states he lives with spouse in 3rd floor apartment with elevator to reach apartment, states he is independent with ADLS, drives, employed and Cpap at home for use and affords medications without difficulty. Demographics, insurance and PCP confirmed    CM staff will remain available to assist as needed with dc needs, no needs anticipated from initial visit. Care Management Interventions  PCP Verified by CM:  Yes (Dr Lorena Lunsford last visit 1/7/22)  MyChart Signup: No  Discharge Durable Medical Equipment: No  Physical Therapy Consult: No  Occupational Therapy Consult: No  Speech Therapy Consult: No  Support Systems: Spouse/Significant Other  Confirm Follow Up Transport: Family  Discharge Location  Patient Expects to be Discharged to[de-identified] Home with family assistance

## 2022-01-28 NOTE — ED PROVIDER NOTES
64-year-old male with a history of sarcoidosis and atrial fibrillation presents with a pulmonary embolism. He developed right-sided chest pain 2 weeks ago. He was diagnosed with pneumonia and placed on antibiotics. Symptoms improved, but returned again 2 days ago. He followed up with his doctor and was found to have elevated D-dimer. He had a CT chest performed today that showed multiple pulmonary emboli. He denies any recent long distance travel, clotting disorder, or immobility. He was taken off of anticoagulants about 3 years ago after his third ablation because he was no longer in atrial fibrillation. He states he is recovered from sarcoidosis about 4 years ago. He has some shortness of breath due to pain. He has had low-grade fevers. No cough. Past Medical History:   Diagnosis Date    [facility]fib ([facility]     BPH (benign prostatic hyperplasia)     Personal history of prostate cancer     Sarcoidosis        Past Surgical History:   Procedure Laterality Date    HX ORTHOPAEDIC      3 knee surgeries          No family history on file. Social History     Socioeconomic History    Marital status:      Spouse name: Not on file    Number of children: Not on file    Years of education: Not on file    Highest education level: Not on file   Occupational History    Not on file   Tobacco Use    Smoking status: Former Smoker    Smokeless tobacco: Never Used   Substance and Sexual Activity    Alcohol use:  Yes     Alcohol/week: 3.0 standard drinks     Types: 3 Glasses of wine per week    Drug use: Never    Sexual activity: Not on file   Other Topics Concern    Not on file   Social History Narrative    Not on file     Social Determinants of Health     Financial Resource Strain:     Difficulty of Paying Living Expenses: Not on file   Food Insecurity:     Worried About Running Out of Food in the Last Year: Not on file    Sheri of Food in the Last Year: Not on file   Transportation Needs:  Lack of Transportation (Medical): Not on file    Lack of Transportation (Non-Medical): Not on file   Physical Activity:     Days of Exercise per Week: Not on file    Minutes of Exercise per Session: Not on file   Stress:     Feeling of Stress : Not on file   Social Connections:     Frequency of Communication with Friends and Family: Not on file    Frequency of Social Gatherings with Friends and Family: Not on file    Attends Anabaptist Services: Not on file    Active Member of [address] [place] [address] or Organizations: Not on file    Attends Club or Organization Meetings: Not on file    Marital Status: Not on file   Intimate Partner Violence:     Fear of Current or Ex-Partner: Not on file    Emotionally Abused: Not on file    Physically Abused: Not on file    Sexually Abused: Not on file   Housing Stability:     Unable to Pay for Housing in the Last Year: Not on file    Number of [place] in the Last Year: Not on file    Unstable Housing in the Last Year: Not on file         ALLERGIES: Amoxicillin    Review of Systems   Constitutional: Negative for fever. HENT: Negative for hearing loss. Eyes: Negative for visual disturbance. Respiratory: Positive for shortness of breath. Negative for cough. Cardiovascular: Positive for chest pain. Gastrointestinal: Negative for abdominal pain, diarrhea, nausea and vomiting. Musculoskeletal: Negative for back pain. Skin: Negative for rash. Neurological: Negative for headaches. Psychiatric/Behavioral: Negative for confusion. All other systems reviewed and are negative. Vitals:    01/27/22 1839 01/27/22 1841   BP: (!) 151/94    Pulse: 87    Resp: 18    Temp:  99.1 °F (37.3 °C)   SpO2: 97%    Weight: 117.9 kg (260 lb)    Height: 6' 1\" (1.854 m)             Physical Exam  Vitals and nursing note reviewed. Constitutional:       Appearance: Normal appearance. He is well-developed. HENT:      Head: Normocephalic and atraumatic.       Nose: Nose normal. Mouth/Throat:      Mouth: Mucous membranes are moist.   Eyes:      Pupils: Pupils are equal, round, and reactive to light. Cardiovascular:      Rate and Rhythm: Regular rhythm. Heart sounds: Normal heart sounds. Pulmonary:      Effort: Pulmonary effort is normal.      Breath sounds: Normal breath sounds. Abdominal:      Palpations: Abdomen is soft. Tenderness: There is no abdominal tenderness. Musculoskeletal:         General: No deformity. Normal range of motion. Cervical back: Normal range of motion and neck supple. Skin:     General: Skin is warm and dry. Neurological:      General: No focal deficit present. Mental Status: He is alert. Mental status is at baseline. Psychiatric:         Mood and Affect: Mood normal.         Behavior: Behavior normal.          MDM  Number of Diagnoses or Management Options  Diagnosis management comments: Parts of this document were created using dragon voice recognition software. The chart has been reviewed but errors may still be present. I wore appropriate PPE throughout this patient's ED visit. Nadia Hernandez MD, 9:45 PM    Labs and heparin ordered. Will admit. Amount and/or Complexity of Data Reviewed  Clinical lab tests: ordered and reviewed  Tests in the radiology section of CPT®: reviewed (CT CHEST PULMONARY EMBOLISM    Result Date: 1/27/2022  CT OF THE CHEST WITH INTRAVENOUS CONTRAST, 1/27/2022 Indication: Right-sided chest pain, elevated d-dimer, and fever. Comparison: None Technique:   2.5 mm axial scans from above the aortic arch to the lung bases following the uneventful administration of 70 mL of Isovue-370. Intravenous contrast was given to evaluate for pulmonary embolism. All CT scans performed at this facility use one or all of the following: Automated exposure control, adjustment of the mA and/or kVp according to patient's size, iterative reconstruction. Findings: The base of the neck is unremarkable in appearance.   No axillary, mediastinal, or hilar lymphadenopathy is seen. A calcified prevascular mediastinal lymph node is seen likely representing benign sequela of chronic granulomatous infection. The thoracic aorta is normal in caliber. Opacification of the pulmonary arteries is good. Abnormal filling defects are seen within pulmonary arteries extending to the left upper lobe and bilateral lower lobes consistent with pulmonary emboli. Evaluation with lung windows demonstrates no clear acute infiltrate. Irregular densities are seen at the left lung base favored to represent areas of scarring or atelectasis. No clear pulmonary embolus extends to these portions of lungs to suggest pulmonary infarctions. No pleural effusion is seen. Lungs are expanded without evidence for pneumothorax. No acute osseous abnormality is seen. Limited evaluation of the upper abdomen demonstrates no acute abnormality. 1.  Abnormal filling defects within pulmonary arteries extending to the left upper lobe and bilateral lower lobes consistent with pulmonary emboli. 2. No significant acute appearing infiltrate. Only irregular densities are seen at the right lung base favored to represent areas of scarring or atelectasis. This report was made using voice transcription. Despite my best efforts to avoid any, transcription errors may persist. If there is any question about the accuracy of the report or need for clarification, then please call [phone], or text me through [employer] for clarification or correction. These findings were discussed with Dr. Alan Johnson by myself personally.  This on-call physician was initially paged at 5:18 PM with subsequent communication at 5:43 PM on 1/27/2022.    )  Tests in the medicine section of CPT®: reviewed and ordered           EKG    Date/Time: 1/27/2022 10:03 PM  Performed by: Rosalind Henry MD  Authorized by: Rosalind Henry MD     ECG reviewed by ED Physician in the absence of a cardiologist: yes Interpretation:     Interpretation: abnormal    Rate:     ECG rate:  82    ECG rate assessment: normal    Rhythm:     Rhythm: sinus rhythm    Ectopy:     Ectopy: none    Conduction:     Conduction: abnormal      Abnormal conduction: complete RBBB and LAFB    ST segments:     ST segments:  Non-specific  T waves:     T waves: non-specific

## 2022-01-28 NOTE — ED NOTES
I have reviewed discharge instructions with the patient. The patient verbalized understanding. Patient left ED via Discharge Method: ambulatory to Home with spouse. Opportunity for questions and clarification provided. Patient given 1 scripts. To continue your aftercare when you leave the hospital, you may receive an automated call from our care team to check in on how you are doing. This is a free service and part of our promise to provide the best care and service to meet your aftercare needs.  If you have questions, or wish to unsubscribe from this service please call [phone]. Thank you for Choosing our [facility] Emergency Department.

## 2022-01-28 NOTE — ASSESSMENT & PLAN NOTE
- B PEs on CT chest  - Started on heparin drip in ER  - Will check echo to evaluate for heart strain  - Not hypoxic, likely able to transition to oral AC soon

## 2022-02-10 PROBLEM — H90.3 SENSORINEURAL HEARING LOSS (SNHL) OF BOTH EARS: Status: ACTIVE | Noted: 2022-02-10

## 2022-02-10 PROBLEM — E55.9 VITAMIN D DEFICIENCY: Status: RESOLVED | Noted: 2022-01-05 | Resolved: 2022-02-10

## 2022-02-10 PROBLEM — R07.89 CHEST WALL PAIN: Status: RESOLVED | Noted: 2022-01-26 | Resolved: 2022-02-10

## 2022-03-18 PROBLEM — R73.03 PREDIABETES: Status: ACTIVE | Noted: 2022-01-19

## 2022-03-18 PROBLEM — G62.9 PERIPHERAL POLYNEUROPATHY: Status: ACTIVE | Noted: 2022-01-05

## 2022-03-19 PROBLEM — I10 HYPERTENSION: Status: ACTIVE | Noted: 2022-01-05

## 2022-03-19 PROBLEM — E66.9 OBESITY (BMI 35.0-39.9 WITHOUT COMORBIDITY): Status: ACTIVE | Noted: 2022-01-05

## 2022-03-19 PROBLEM — K21.00 GASTROESOPHAGEAL REFLUX DISEASE WITH ESOPHAGITIS: Status: ACTIVE | Noted: 2022-01-05

## 2022-03-19 PROBLEM — M47.816 LUMBAR SPONDYLOSIS: Status: ACTIVE | Noted: 2022-01-05

## 2022-03-19 PROBLEM — I26.99 PULMONARY EMBOLISM (HCC): Status: ACTIVE | Noted: 2022-01-28

## 2022-03-19 PROBLEM — H90.3 SENSORINEURAL HEARING LOSS (SNHL) OF BOTH EARS: Status: ACTIVE | Noted: 2022-02-10

## 2022-03-19 PROBLEM — Z00.00 PREVENTATIVE HEALTH CARE: Status: ACTIVE | Noted: 2022-01-05

## 2022-03-19 PROBLEM — Z85.828 HX OF SKIN CANCER, BASAL CELL: Status: ACTIVE | Noted: 2022-01-05

## 2022-03-19 PROBLEM — C61 PROSTATE CANCER (HCC): Status: ACTIVE | Noted: 2022-01-05

## 2022-03-19 PROBLEM — H91.93 BILATERAL HEARING LOSS: Status: ACTIVE | Noted: 2022-01-05

## 2022-03-20 PROBLEM — G47.33 OSA (OBSTRUCTIVE SLEEP APNEA): Status: ACTIVE | Noted: 2022-01-19

## 2022-04-14 PROBLEM — M65.331 TRIGGER MIDDLE FINGER OF RIGHT HAND: Status: ACTIVE | Noted: 2022-04-14

## 2022-04-14 PROBLEM — R20.0 BILATERAL LEG NUMBNESS: Status: ACTIVE | Noted: 2022-04-14

## 2022-04-18 PROBLEM — G56.03 BILATERAL CARPAL TUNNEL SYNDROME: Status: ACTIVE | Noted: 2022-04-18

## 2022-05-26 ENCOUNTER — OFFICE VISIT (OUTPATIENT)
Dept: INTERNAL MEDICINE CLINIC | Facility: CLINIC | Age: 62
End: 2022-05-26
Payer: COMMERCIAL

## 2022-05-26 VITALS
TEMPERATURE: 98.6 F | SYSTOLIC BLOOD PRESSURE: 116 MMHG | OXYGEN SATURATION: 97 % | HEIGHT: 73 IN | WEIGHT: 277.6 LBS | HEART RATE: 70 BPM | DIASTOLIC BLOOD PRESSURE: 77 MMHG | BODY MASS INDEX: 36.79 KG/M2

## 2022-05-26 DIAGNOSIS — R73.03 PREDIABETES: Primary | ICD-10-CM

## 2022-05-26 DIAGNOSIS — I48.11 LONGSTANDING PERSISTENT ATRIAL FIBRILLATION (HCC): ICD-10-CM

## 2022-05-26 DIAGNOSIS — I26.92 SADDLE EMBOLUS OF PULMONARY ARTERY WITHOUT ACUTE COR PULMONALE, UNSPECIFIED CHRONICITY (HCC): ICD-10-CM

## 2022-05-26 DIAGNOSIS — I10 PRIMARY HYPERTENSION: Primary | ICD-10-CM

## 2022-05-26 DIAGNOSIS — G47.33 OSA (OBSTRUCTIVE SLEEP APNEA): ICD-10-CM

## 2022-05-26 DIAGNOSIS — K21.00 GASTROESOPHAGEAL REFLUX DISEASE WITH ESOPHAGITIS WITHOUT HEMORRHAGE: ICD-10-CM

## 2022-05-26 DIAGNOSIS — I10 PRIMARY HYPERTENSION: ICD-10-CM

## 2022-05-26 PROCEDURE — 3017F COLORECTAL CA SCREEN DOC REV: CPT | Performed by: INTERNAL MEDICINE

## 2022-05-26 PROCEDURE — 99214 OFFICE O/P EST MOD 30 MIN: CPT | Performed by: INTERNAL MEDICINE

## 2022-05-26 PROCEDURE — G8427 DOCREV CUR MEDS BY ELIG CLIN: HCPCS | Performed by: INTERNAL MEDICINE

## 2022-05-26 PROCEDURE — G8419 CALC BMI OUT NRM PARAM NOF/U: HCPCS | Performed by: INTERNAL MEDICINE

## 2022-05-26 PROCEDURE — 4004F PT TOBACCO SCREEN RCVD TLK: CPT | Performed by: INTERNAL MEDICINE

## 2022-05-26 RX ORDER — ORAL SEMAGLUTIDE 7 MG/1
7 TABLET ORAL DAILY
Qty: 30 TABLET | Refills: 3 | Status: SHIPPED | OUTPATIENT
Start: 2022-05-26 | End: 2022-07-18

## 2022-05-26 RX ORDER — LOSARTAN POTASSIUM AND HYDROCHLOROTHIAZIDE 12.5; 5 MG/1; MG/1
TABLET ORAL
Qty: 90 TABLET | Refills: 1 | Status: SHIPPED | OUTPATIENT
Start: 2022-05-26 | End: 2022-08-25 | Stop reason: SDUPTHER

## 2022-05-26 ASSESSMENT — ENCOUNTER SYMPTOMS
ABDOMINAL PAIN: 0
SHORTNESS OF BREATH: 0
SINUS PAIN: 0
BACK PAIN: 0
CONSTIPATION: 0
VOMITING: 0
COUGH: 0
EYE PAIN: 0
SINUS PRESSURE: 0
DIARRHEA: 0
NAUSEA: 0

## 2022-05-26 ASSESSMENT — PATIENT HEALTH QUESTIONNAIRE - PHQ9
1. LITTLE INTEREST OR PLEASURE IN DOING THINGS: 0
SUM OF ALL RESPONSES TO PHQ9 QUESTIONS 1 & 2: 0
SUM OF ALL RESPONSES TO PHQ QUESTIONS 1-9: 0
2. FEELING DOWN, DEPRESSED OR HOPELESS: 0
SUM OF ALL RESPONSES TO PHQ QUESTIONS 1-9: 0

## 2022-05-26 NOTE — PROGRESS NOTES
Wong Rivera (:  1960) is a 59 y.o. male,Established patient, here for evaluation of the following chief complaint(s):  Hypertension         ASSESSMENT/PLAN:  1. Longstanding persistent atrial fibrillation (HCC)  2. Saddle embolus of pulmonary artery without acute cor pulmonale, unspecified chronicity ([facility] [address]  3. Primary hypertension  4. DANE (obstructive sleep apnea)  5. Gastroesophageal reflux disease with esophagitis without hemorrhage  6. Prediabetes      No follow-ups on file. Subjective   SUBJECTIVE/OBJECTIVE:  Patient is a 57-year-old man with medical history significant for obesity, hypertension, atrial fibrillation, pulmonary embolism, obstructive sleep apnea, peripheral neuropathy, prostate cancer, and bilateral leg numbness that presents for follow-up visit. Significant improvement in A1c from 6.2-5.9 at last check. Blood pressure well controlled in office today to 116/77. April CMP also very reassuring no deviations from normal aside from glucose. Patient recently discontinued Metformin. He experienced weight gain, and increased hunger. With failure of Metformin, patient interested in trial of Rybelsus. Weight currently 277. Patient has be to hand surgeon with order of EMG of hands. 3 weeks later, patient was able to make an appointment. Several efforts through new patient lines. Patient had to bypass system and get scheduled. One on  and  for lower extremities. Will be back to surgeon shortly after that. Dermatology referral made previously. Call from [facility] has resulted in appointment in 2 months. Patient now using Osteoiflex for right knee arthritis. He has interest in pain medications of a supplemental variety. He is currently using Turmeric for anti-inflammatory effects. No more than 1.5 g daily of turmeric. Nightly phlegm production following eating. Review of Systems   Constitutional: Negative for chills and fever. HENT: Negative for hearing loss, postnasal drip, sinus pressure and sinus pain. Eyes: Negative for pain and visual disturbance. Respiratory: Negative for cough and shortness of breath. Cardiovascular: Negative for chest pain and palpitations. Gastrointestinal: Negative for abdominal pain, constipation, diarrhea, nausea and vomiting. Endocrine: Negative for polyuria. Genitourinary: Negative for difficulty urinating, frequency and urgency. Musculoskeletal: Negative for arthralgias, back pain and neck pain. Skin: Negative for rash and wound. Neurological: Negative for dizziness, weakness and headaches. Psychiatric/Behavioral: Negative for behavioral problems and sleep disturbance. The patient is not nervous/anxious. Objective   Physical Exam  Vitals reviewed. Constitutional:       General: He is not in acute distress. Appearance: Normal appearance. HENT:      Head: Normocephalic and atraumatic. Right Ear: External ear normal.      Left Ear: External ear normal.      Nose: Nose normal.      Mouth/Throat:      Mouth: Mucous membranes are dry. Pharynx: Oropharynx is clear. Eyes:      Extraocular Movements: Extraocular movements intact. Conjunctiva/sclera: Conjunctivae normal.   Cardiovascular:      Rate and Rhythm: Normal rate and regular rhythm. Pulses: Normal pulses. Heart sounds: Normal heart sounds. Pulmonary:      Effort: Pulmonary effort is normal.      Breath sounds: Normal breath sounds. No wheezing. Abdominal:      General: Bowel sounds are normal.      Tenderness: There is no abdominal tenderness. Musculoskeletal:         General: No swelling or deformity. Normal range of motion. Cervical back: Normal range of motion. Skin:     General: Skin is warm and dry. Coloration: Skin is not jaundiced. Neurological:      General: No focal deficit present. Mental Status: He is alert and oriented to person, place, and time.  Mental status is at baseline. Psychiatric:         Mood and Affect: Mood normal.         Behavior: Behavior normal.         Thought Content: Thought content normal.            On this date 5/26/2022 I have spent 30 minutes reviewing previous notes, test results and face to face with the patient discussing the diagnosis and importance of compliance with the treatment plan as well as documenting on the day of the visit. An electronic signature was used to authenticate this note.     --Mahlon Simmonds, DO

## 2022-06-01 ENCOUNTER — TELEPHONE (OUTPATIENT)
Dept: SLEEP MEDICINE | Age: 62
End: 2022-06-01

## 2022-06-01 NOTE — TELEPHONE ENCOUNTER
Order was put in goscripts,This order was electronically signed by Carole CAMARGO on 05/20/2022 09:21:23 AM.  I contact paula from [facility] to pull order and contact pt.

## 2022-06-01 NOTE — TELEPHONE ENCOUNTER
Patient is calling again. He has called [facility] and they are saying that they do not have this.

## 2022-06-20 ENCOUNTER — PATIENT MESSAGE (OUTPATIENT)
Dept: INTERNAL MEDICINE CLINIC | Facility: CLINIC | Age: 62
End: 2022-06-20

## 2022-06-20 DIAGNOSIS — K21.00 GASTROESOPHAGEAL REFLUX DISEASE WITH ESOPHAGITIS WITHOUT HEMORRHAGE: Primary | ICD-10-CM

## 2022-06-20 RX ORDER — BENZONATATE 100 MG/1
100 CAPSULE ORAL 2 TIMES DAILY PRN
Qty: 20 CAPSULE | Refills: 0 | Status: SHIPPED | OUTPATIENT
Start: 2022-06-20 | End: 2022-06-30

## 2022-06-21 ENCOUNTER — OFFICE VISIT (OUTPATIENT)
Dept: INTERNAL MEDICINE CLINIC | Facility: CLINIC | Age: 62
End: 2022-06-21
Payer: COMMERCIAL

## 2022-06-21 VITALS
HEIGHT: 73 IN | DIASTOLIC BLOOD PRESSURE: 69 MMHG | TEMPERATURE: 98.1 F | WEIGHT: 273 LBS | SYSTOLIC BLOOD PRESSURE: 106 MMHG | BODY MASS INDEX: 36.18 KG/M2 | OXYGEN SATURATION: 97 % | HEART RATE: 72 BPM

## 2022-06-21 DIAGNOSIS — R05.9 COUGH: ICD-10-CM

## 2022-06-21 DIAGNOSIS — I10 PRIMARY HYPERTENSION: Primary | ICD-10-CM

## 2022-06-21 DIAGNOSIS — K21.00 GASTROESOPHAGEAL REFLUX DISEASE WITH ESOPHAGITIS, UNSPECIFIED WHETHER HEMORRHAGE: ICD-10-CM

## 2022-06-21 DIAGNOSIS — J06.9 UPPER RESPIRATORY TRACT INFECTION, UNSPECIFIED TYPE: ICD-10-CM

## 2022-06-21 PROCEDURE — 4004F PT TOBACCO SCREEN RCVD TLK: CPT | Performed by: INTERNAL MEDICINE

## 2022-06-21 PROCEDURE — 99214 OFFICE O/P EST MOD 30 MIN: CPT | Performed by: INTERNAL MEDICINE

## 2022-06-21 PROCEDURE — G8417 CALC BMI ABV UP PARAM F/U: HCPCS | Performed by: INTERNAL MEDICINE

## 2022-06-21 PROCEDURE — 3017F COLORECTAL CA SCREEN DOC REV: CPT | Performed by: INTERNAL MEDICINE

## 2022-06-21 PROCEDURE — G8427 DOCREV CUR MEDS BY ELIG CLIN: HCPCS | Performed by: INTERNAL MEDICINE

## 2022-06-21 RX ORDER — AZITHROMYCIN 500 MG/1
500 TABLET, FILM COATED ORAL DAILY
Qty: 5 TABLET | Refills: 0 | Status: SHIPPED | OUTPATIENT
Start: 2022-06-21 | End: 2022-06-26

## 2022-06-21 ASSESSMENT — PATIENT HEALTH QUESTIONNAIRE - PHQ9
1. LITTLE INTEREST OR PLEASURE IN DOING THINGS: 0
SUM OF ALL RESPONSES TO PHQ QUESTIONS 1-9: 0
SUM OF ALL RESPONSES TO PHQ QUESTIONS 1-9: 0
2. FEELING DOWN, DEPRESSED OR HOPELESS: 0
SUM OF ALL RESPONSES TO PHQ QUESTIONS 1-9: 0
SUM OF ALL RESPONSES TO PHQ9 QUESTIONS 1 & 2: 0
SUM OF ALL RESPONSES TO PHQ QUESTIONS 1-9: 0

## 2022-06-21 ASSESSMENT — ENCOUNTER SYMPTOMS
COUGH: 0
SHORTNESS OF BREATH: 0
EYE PAIN: 0
NAUSEA: 0
SINUS PAIN: 0
ABDOMINAL PAIN: 0
VOMITING: 0
DIARRHEA: 0
BACK PAIN: 0
SINUS PRESSURE: 0
CONSTIPATION: 0

## 2022-06-22 ENCOUNTER — TELEPHONE (OUTPATIENT)
Dept: INTERNAL MEDICINE CLINIC | Facility: CLINIC | Age: 62
End: 2022-06-22

## 2022-06-22 DIAGNOSIS — U07.1 COVID-19: Primary | ICD-10-CM

## 2022-06-22 NOTE — TELEPHONE ENCOUNTER
Pt states he was told by Eliazar Ziegler to take a second Covid test and if it was positive to call and let the office know so that something could be sent in, the second test was positive. He can be called back at [phone].     Wife is having same symptoms, has not taken a test.

## 2022-06-22 NOTE — TELEPHONE ENCOUNTER
I've sent in a prescription for a 5 day course of Paxlovid for him. The purpose of this medication is to prevent hospitalization. You should take only one of your Eliquis pills for the next 7 days once your start the Paxlovid. After seven days have passed since the start of Paxlovid, resume normal Eliquis dosing. If you have another Covid-19 test available, check your wife. If she has symptoms and is positive, I'll send in a script for Paxlovid for her too.

## 2022-06-27 ENCOUNTER — TELEPHONE (OUTPATIENT)
Dept: INTERNAL MEDICINE CLINIC | Facility: CLINIC | Age: 62
End: 2022-06-27

## 2022-06-27 NOTE — LETTER
The following patient's Kurt Boo and Ramona Upton are under the care of Nabil Sánchez at [facility]. Due to both patient's testing positive for COVID they were unable to attend [facility] in [place] on June 24th,2022. Per the CDC guidelines we recommend home isolation until the following conditions are all met:    1. At least five days have passed since symptoms first appeared and/or had a close exposure,   2. After home isolation for five days, wearing a mask around others for the next five days,  3. At least 24 have passed since last fever without the use of fever-reducing medications and  4. Symptoms (eg cough, shortness of breath) have improved    If you have any questions or concerns, please don't hesitate to call. The insurance policy at the time of ticket purchase should cover this diagnosis of covid with necessary home isolation of COVID.       Thank you,     Dr. Rajesh Tran, DO    [phone]

## 2022-07-07 ENCOUNTER — OFFICE VISIT (OUTPATIENT)
Dept: NEUROLOGY | Age: 62
End: 2022-07-07
Payer: COMMERCIAL

## 2022-07-07 VITALS
BODY MASS INDEX: 35.75 KG/M2 | SYSTOLIC BLOOD PRESSURE: 126 MMHG | WEIGHT: 271 LBS | HEART RATE: 72 BPM | DIASTOLIC BLOOD PRESSURE: 83 MMHG

## 2022-07-07 DIAGNOSIS — R20.2 PARESTHESIA OF BOTH HANDS: Primary | ICD-10-CM

## 2022-07-07 DIAGNOSIS — M79.642 BILATERAL HAND PAIN: ICD-10-CM

## 2022-07-07 DIAGNOSIS — G56.03 BILATERAL CARPAL TUNNEL SYNDROME: ICD-10-CM

## 2022-07-07 DIAGNOSIS — G56.22 LESION OF LEFT ULNAR NERVE: ICD-10-CM

## 2022-07-07 DIAGNOSIS — M79.641 BILATERAL HAND PAIN: ICD-10-CM

## 2022-07-07 DIAGNOSIS — R20.0 BILATERAL LEG NUMBNESS: ICD-10-CM

## 2022-07-07 PROCEDURE — 95885 MUSC TST DONE W/NERV TST LIM: CPT | Performed by: PSYCHIATRY & NEUROLOGY

## 2022-07-07 PROCEDURE — 99204 OFFICE O/P NEW MOD 45 MIN: CPT | Performed by: PSYCHIATRY & NEUROLOGY

## 2022-07-07 PROCEDURE — 95913 NRV CNDJ TEST 13/> STUDIES: CPT | Performed by: PSYCHIATRY & NEUROLOGY

## 2022-07-07 ASSESSMENT — VISUAL ACUITY: OU: 1

## 2022-07-07 NOTE — PROGRESS NOTES
EMG/Nerve Conduction Study Procedure Note  [address] [place] [facility]   [phone]              7/7/2022  Maribel Pablo     Patient is referred by the following provider for consultation regarding as below: For EMG/NCV and consultation regarding upper extremities and hands. Dear     Ricardo Jay, NP/Osbaldo Lee                   Chief Complaint:  Bilateral hand pain. Chronic. Paresthesia of the hands. Paresthesia feet and legs. Back pain. Summary                Needle EMG of selected hand/forearm mm. . as below with Conduc paula.           49-year-old right-handed  white male with upper extremity paresthesia weakness of the hands but also chronic hand pain. Chronic = 19 80s and 90s. Prediabetic. Sensory disturbances much less than the pain. \              Unaccompanied             right handed 57 y.o.      male       * I reviewed the available and pertinent records - including eHR and Care Everywhere - notes of PMHx, PSHx, Fam Hx, and  and have examined patient with the following findings: hands -- no images. Hand pain . IMAGING REVIEW:  I REVIEWED PERTINENT  IMAGES AND REPORTS WITH THE PATIENT PERSONALLY, DIRECTLY AND FULLY. 9 extra  MINUTES.      Past Medical History:  Past Medical History:   Diagnosis Date    A-fib ([facility] [address]     BPH (benign prostatic hyperplasia)     Personal history of prostate cancer     Sarcoidosis        Past Surgical History:  Past Surgical History:   Procedure Laterality Date    ORTHOPEDIC SURGERY      3 knee surgeries        Social History:  Social History     Socioeconomic History    Marital status:      Spouse name: Not on file    Number of children: Not on file    Years of education: Not on file    Highest education level: Not on file   Occupational History    Not on file   Tobacco Use    Smoking status: Former Smoker    Smokeless tobacco: Never Used   Substance and Sexual Activity    Alcohol use: Yes     Alcohol/week: 3.0 standard drinks    Drug use: Never    Sexual activity: Not on file   Other Topics Concern    Not on file   Social History Narrative    Not on file     Social Determinants of Health     Financial Resource Strain:     Difficulty of Paying Living Expenses: Not on file   Food Insecurity:     Worried About Running Out of Food in the Last Year: Not on file    Darek of Food in the Last Year: Not on file   Transportation Needs:     Lack of Transportation (Medical): Not on file    Lack of Transportation (Non-Medical): Not on file   Physical Activity:     Days of Exercise per Week: Not on file    Minutes of Exercise per Session: Not on file   Stress:     Feeling of Stress : Not on file   Social Connections:     Frequency of Communication with Friends and Family: Not on file    Frequency of Social Gatherings with Friends and Family: Not on file    Attends [employer] Services: Not on file    Active Member of [address] [place] [address] or Organizations: Not on file    Attends Club or Organization Meetings: Not on file    Marital Status: Not on file   Intimate Partner Violence:     Fear of Current or Ex-Partner: Not on file    Emotionally Abused: Not on file    Physically Abused: Not on file    Sexually Abused: Not on file   Housing Stability:     Unable to Pay for Housing in the Last Year: Not on file    Number of [place] in the Last Year: Not on file    Unstable Housing in the Last Year: Not on file       Family History:   No family history on file. Medications:      Current Outpatient Medications:     Nirmatrelvir & Ritonavir 10 x 150 MG & 10 x 100MG TBPK, Take 1 Package by mouth daily Take according to package instructions. , Disp: 1 each, Rfl: 0    Semaglutide (RYBELSUS) 7 MG TABS, Take 7 mg by mouth daily, Disp: 30 tablet, Rfl: 3    losartan-hydroCHLOROthiazide (HYZAAR) 50-12.5 MG per tablet, TAKE 1 TABLET BY MOUTH EVERY DAY, Disp: 90 tablet, Rfl: 1    apixaban (ELIQUIS) 5 MG TABS tablet, Take 5 mg by mouth 2 times daily, Disp: , Rfl:     atorvastatin (LIPITOR) 40 MG tablet, Take 40 mg by mouth daily, Disp: , Rfl:     cyclobenzaprine (FLEXERIL) 5 MG tablet, Take 5 mg by mouth 3 times daily as needed, Disp: , Rfl:     esomeprazole (NEXIUM) 40 MG delayed release capsule, Take 40 mg by mouth daily, Disp: , Rfl:     folic acid (FOLVITE) 009 MCG tablet, Take 400 mcg by mouth daily, Disp: , Rfl:       Allergies   Allergen Reactions    Clavulanic Acid     Amoxicillin Rash       Review of Systems:  Review of Systems   Genitourinary: Negative. Neurological: Positive for sensory change. Negative for dizziness, tingling, tremors, speech change, focal weakness, seizures, loss of consciousness, weakness and headaches. He does have to shake his hands sometimes. He goes to bed however and notes that even under the sheets he cannot feel the sheets with his toes. No burning pains. Psychiatric/Behavioral: Negative. All other systems reviewed and are negative. Extended / Orthostatic Vitals:    Vitals:    07/07/22 0922   BP: 126/83   Site: Left Upper Arm   Pulse: 72   Weight: 271 lb (122.9 kg)        Physical Exam  Vitals reviewed. Constitutional:       General: He is awake. Appearance: He is well-developed and well-groomed. HENT:      Head: Normocephalic and atraumatic. No raccoon eyes, abrasion, contusion, right periorbital erythema or left periorbital erythema. Right Ear: Hearing normal.      Left Ear: Hearing normal.   Eyes:      General: Lids are normal. Vision grossly intact. No visual field deficit or scleral icterus. Right eye: No discharge. Left eye: No discharge. Extraocular Movements: Extraocular movements intact. Right eye: Normal extraocular motion and no nystagmus. Left eye: Normal extraocular motion and no nystagmus.       Conjunctiva/sclera: Conjunctivae normal.      Right eye: Right conjunctiva is not injected. Left eye: Left conjunctiva is not injected. Pupils: Pupils are equal, round, and reactive to light. Neck:      Trachea: Phonation normal.   Cardiovascular:      Pulses: Normal pulses. Pulmonary:      Effort: Pulmonary effort is normal. No respiratory distress. Breath sounds: No wheezing. Musculoskeletal:         General: No swelling, tenderness, deformity or signs of injury. Normal range of motion. Cervical back: Normal range of motion. No edema, rigidity or torticollis. Normal range of motion. Right lower leg: No edema. Left lower leg: No edema. Skin:     General: Skin is warm and dry. Coloration: Skin is not cyanotic, jaundiced or pale. Findings: No bruising or ecchymosis. Nails: There is no clubbing. Neurological:      Mental Status: He is alert and easily aroused. Mental status is at baseline. Cranial Nerves: Cranial nerves are intact. No cranial nerve deficit, dysarthria or facial asymmetry. Sensory: Sensory deficit (Mostly median nerve decreased pinprick but some ulnar nerve on the left also sensory decrease.) present. Motor: Motor function is intact. No weakness, tremor, atrophy, abnormal muscle tone or seizure activity. Coordination: Coordination is intact. Coordination normal.      Gait: Gait is intact. Gait normal.      Deep Tendon Reflexes: Reflexes are normal and symmetric. Reflexes normal.      Reflex Scores:       Tricep reflexes are 2+ on the right side and 2+ on the left side. Bicep reflexes are 2+ on the right side and 2+ on the left side. Brachioradialis reflexes are 2+ on the right side and 2+ on the left side. Patellar reflexes are 2+ on the right side and 2+ on the left side. Achilles reflexes are 2+ on the right side and 2+ on the left side. Comments: PERRLA. No Linda sign. No Lhermitte or Spurling signs.   No glabellar or rigidity. No spasticity. No Donya. No Gerstmann. SLR negative bilaterally. Tinel is positive left. Phalen equivocal.  Flick sign positive. No color changes of the hands or feet. Good blanching. Good pulses. Psychiatric:         Attention and Perception: Attention normal.         Mood and Affect: Mood normal.         Speech: Speech normal.         Behavior: Behavior normal. Behavior is cooperative. Neurologic Exam     Mental Status   Attention: normal. Concentration: normal.   Speech: speech is normal   Level of consciousness: alert  Knowledge: good and consistent with education. Able to perform simple calculations. Able to name object. Normal comprehension. Cranial Nerves     CN III, IV, VI   Pupils are equal, round, and reactive to light. Gait, Coordination, and Reflexes     Gait  Gait: normal    Tremor   Resting tremor: absent  Intention tremor: absent  Action tremor: absent    Reflexes   Right brachioradialis: 2+  Left brachioradialis: 2+  Right biceps: 2+  Left biceps: 2+  Right triceps: 2+  Left triceps: 2+  Right patellar: 2+  Left patellar: 2+  Right achilles: 2+  Left achilles: 2+  Right Vargas: absent  Left Vargas: absent   There is no tic, twitch, tonic or clonic activity noted. No dyskinesia. Noted at end of studies the patient has been recently diagnosed with COVID positive and physician had placed him on Paxlovid.      Assessment   Assessment / Plan:    Diagnoses and all orders for this visit:    Paresthesia of both hands  -     Motor &/sens 13/> nerve conduction test  -     Needle EMG w/ nerve conduction test, limited    Bilateral hand pain  -     Motor &/sens 13/> nerve conduction test  -     Needle EMG w/ nerve conduction test, limited    Bilateral carpal tunnel syndrome  -     Motor &/sens 13/> nerve conduction test  -     Needle EMG w/ nerve conduction test, limited    Bilateral leg numbness    Lesion of left ulnar nerve  -     Motor &/sens 13/> nerve conduction test  -     Needle EMG w/ nerve conduction test, limited    1. EMG/NCV reveals bilateral carpal tunnel syndromes that are mild and early but the left 1 is the worst one which involves both sensory and motor segments and fits for possible release operation. He is followed by POA. 2.  He will return tomorrow for his EMG lower extremities because of symptomatology there as well. 3.  Patient made fully aware. He will also avoid resting on his elbows noting the left ulnar slowing at the elbow. Probably compression. The Diagnosis and differential diagnostic considerations, and Rx Tx were reviewed with the patient at length. All the above explained to patient and he acknowledges. I have spent greater than 50% of visit discussing and counseling of patient 45 min visit for treatment and diagnostic plan review. More than 50% of this visit  time was spent in counseling and care coordination. The above time includes pre-  and post- face-face time in records review, and preparation including available pertinent images and reports. Notes: Patient is to continue all medications as directed by prescribing physicians. Continuations on today's visit are made based on the patient's report of current medications. Patient acknowledges the above examination and reviews. Current Meds Verified: Current meds/immunizations reviewed, including purpose with pt. Med Recon list given to pt/family. Pt advised to discard old med lists and provide all providers with current list at each visit and carry list with them in case of emergency. [ *NOTE:  parts or all of this consultation are produced using artificial voice recognition software.   Some speech errors are inherent in such software and may be included in the produced record. ]      Rashad Valdez MD  Consultative Neurology, [address] [facility] [address] [facility], [address] [place] [address]  Phone:  [phone]  Fax:   [phone]

## 2022-07-07 NOTE — PATIENT INSTRUCTIONS
Patient Education        Carpal Tunnel Syndrome: Exercises  Introduction  Here are some examples of exercises for you to try. The exercises may be suggested for a condition or for rehabilitation. Start each exercise slowly. Ease off the exercises if you start to have pain. You will be told when to start these exercises and which ones will work bestfor you. Warm-up stretches  When you no longer have pain or numbness, you can do exercises to help prevent carpal tunnel syndrome from coming back. Do not do any stretch or movement thatis uncomfortable or painful. 1. Rotate your wrist up, down, and from side to side. Repeat 4 times. 2. Stretch your fingers far apart. Relax them, and then stretch them again. Repeat 4 times. 3. Stretch your thumb by pulling it back gently, holding it, and then releasing it. Repeat 4 times. How to do the exercises  Prayer stretch    1. Start with your palms together in front of your chest just below your chin. 2. Slowly lower your hands toward your waistline, keeping your hands close to your stomach and your palms together until you feel a mild to moderate stretch under your forearms. 3. Hold for at least 15 to 30 seconds. Repeat 2 to 4 times. Wrist flexor stretch    1. Extend your arm in front of you with your palm up. 2. Bend your wrist, pointing your hand toward the floor. 3. With your other hand, gently bend your wrist farther until you feel a mild to moderate stretch in your forearm. 4. Hold for at least 15 to 30 seconds. Repeat 2 to 4 times. Wrist extensor stretch    1. Repeat steps 1 through 4 of the stretch above, but begin with your extended hand palm down. Follow-up care is a key part of your treatment and safety. Be sure to make and go to all appointments, and call your doctor if you are having problems. It's also a good idea to know your test results and keep alist of the medicines you take. Where can you learn more? Go to https://selam.[employer]. org and sign in to your [employer] account. Enter S599 in the [facility] box to learn more about \"Carpal Tunnel Syndrome: Exercises. \"     If you do not have an account, please click on the \"Sign Up Now\" link. Current as of: March 9, 2022               Content Version: 13.3  © 2006-2022 [employer]. Care instructions adapted under license by [facility] ([facility]). If you have questions about a medical condition or this instruction, always ask your healthcare professional. [place] [address] any warranty or liability for your use of this information. Patient Education        Carpal Tunnel Syndrome: Exercises  Introduction  Here are some examples of exercises for you to try. The exercises may be suggested for a condition or for rehabilitation. Start each exercise slowly. Ease off the exercises if you start to have pain. You will be told when to start these exercises and which ones will work bestfor you. Warm-up stretches  When you no longer have pain or numbness, you can do exercises to help prevent carpal tunnel syndrome from coming back. Do not do any stretch or movement thatis uncomfortable or painful. 4. Rotate your wrist up, down, and from side to side. Repeat 4 times. 5. Stretch your fingers far apart. Relax them, and then stretch them again. Repeat 4 times. 6. Stretch your thumb by pulling it back gently, holding it, and then releasing it. Repeat 4 times. How to do the exercises  Prayer stretch    4. Start with your palms together in front of your chest just below your chin. 5. Slowly lower your hands toward your waistline, keeping your hands close to your stomach and your palms together until you feel a mild to moderate stretch under your forearms. 6. Hold for at least 15 to 30 seconds. Repeat 2 to 4 times. Wrist flexor stretch    5. Extend your arm in front of you with your palm up. 6. Bend your wrist, pointing your hand toward the floor.   7. With your other hand, gently bend your wrist farther until you feel a mild to moderate stretch in your forearm. 8. Hold for at least 15 to 30 seconds. Repeat 2 to 4 times. Wrist extensor stretch    2. Repeat steps 1 through 4 of the stretch above, but begin with your extended hand palm down. Follow-up care is a key part of your treatment and safety. Be sure to make and go to all appointments, and call your doctor if you are having problems. It's also a good idea to know your test results and keep alist of the medicines you take. Where can you learn more? Go to https://AfterStepspeGamestaqeb.InnerWireless. org and sign in to your [facility] account. Enter R191 in the [employer] box to learn more about \"Carpal Tunnel Syndrome: Exercises. \"     If you do not have an account, please click on the \"Sign Up Now\" link. Current as of: March 9, 2022               Content Version: 13.3  © 2006-2022 [employer]. Care instructions adapted under license by [facility] ([facility]). If you have questions about a medical condition or this instruction, always ask your healthcare professional. [place] [address] any warranty or liability for your use of this information. Patient Education        Ulnar Neuropathy (Handlebar Palsy): Exercises  Introduction  Here are some examples of exercises for you to try. The exercises may be suggested for a condition or for rehabilitation. Start each exercise slowly. Ease off the exercises if you start to have pain. You will be told when to start these exercises and which ones will work bestfor you. How to do the exercises  Neck rotation    1. Sit in a firm chair, or stand up straight. 2. Keeping your chin level, turn your head to the right, and hold for 15 to 30 seconds. 3. Turn your head to the left, and hold for 15 to 30 seconds. 4. Repeat 2 to 4 times to each side. Shoulder blade squeeze    1.  While standing with your arms at your sides, squeeze your shoulder blades together. Do not raise your shoulders as you are squeezing. 2. Hold for 6 seconds. 3. Repeat 8 to 12 times. Neck stretches    1. Look straight ahead, and tip your right ear to your right shoulder. Do not let your left shoulder rise as you tip your head to the right. 2. Hold for 15 to 30 seconds. 3. Tilt your head to the left. Do not let your right shoulder rise as you tip your head to the left. 4. Hold for 15 to 30 seconds. 5. Repeat 2 to 4 times to each side. Elbow flexion and extension    If this exercise causes numbness, tingling, or pain in your hand, ease off of the stretch. You should not have symptoms as you stretch. If you cannot back off enough so that you can do the exercise without symptoms, stop doing theexercise right away. 1. Stand with your arms relaxed at your sides. 2. With your affected arm, gently bend your elbow up toward you as far as possible. 3. Then straighten your arm as much as you can. 4. Repeat 2 to 4 times. Wrist flexor stretch    If this exercise causes numbness, tingling, or pain in your hand, ease off of the stretch. You should not have symptoms as you stretch. If you cannot back off enough so that you can do the exercise without symptoms, stop doing theexercise right away. 1. Extend your affected arm in front of you with your palm facing away from your body. 2. Bend back your wrist on your affected arm, pointing your hand up toward the ceiling. 3. With your other hand, gently bend your wrist farther until you feel a mild to moderate stretch in your forearm. 4. Hold for at least 15 to 30 seconds. 5. Repeat 2 to 4 times. 6. Repeat steps 1 through 5, but this time extend your affected arm in front of you with your palm facing up. Then bend back your wrist, pointing your hand toward the floor. Wrist flexion and extension    If this exercise causes numbness, tingling, or pain in your hand, ease off of the stretch. You should not have symptoms as you stretch.  If you cannot back off enough so that you can do the exercise without symptoms, stop doing theexercise right away. 1. Place your forearm on a table, with your affected hand and wrist extended beyond the table, palm down. 2. Slowly bend your wrist to move your hand upward and allow your hand to close into a fist. Hold for about 6 seconds. 3. Then lower your hand and allow your fingers to relax. Hold this position for about 6 seconds. You should feel a gentle stretch. 4. Repeat 8 to 12 times. Follow-up care is a key part of your treatment and safety. Be sure to make and go to all appointments, and call your doctor if you are having problems. It's also a good idea to know your test results and keep alist of the medicines you take. Where can you learn more? Go to https://3rd Planetdevanteeb.Humansized. org and sign in to your my[employer] account. Enter X692 in the ivWatch box to learn more about \"Ulnar Neuropathy (Handlebar Palsy): Exercises. \"     If you do not have an account, please click on the \"Sign Up Now\" link. Current as of: March 9, 2022               Content Version: 13.3  © 4852-3189 Healthwise, Incorporated. Care instructions adapted under license by [facility] ([facility]). If you have questions about a medical condition or this instruction, always ask your healthcare professional. [address]  any warranty or liability for your use of this information.

## 2022-07-07 NOTE — PROGRESS NOTES
EMG/Nerve Conduction Study Procedure Note  [address]    [address] [facility]   [phone]      Hx:    Exam:     57 y.o.y.o. male   Tech [de-identified] Ricke Galeazzi. [place] Kiran Hx:    Exam:     57 y. o.RH male referred for EMG/NCV of the upper extremities to r/o CTS. Patient reports chronic bilateral hand pain ever since BCTR back in 1980 as well as decreased hand strength. States no numbness or tingling. He is a borderline diabetic, last A1c 5.9, highest A1c 6.2 = = for EMG today and tomorrow he will return for EMG of the lower extremities. Needle EMG of selected muscles of the upper extremities as tested below. Conduction velocity testing both upper extremities. 1.      Controlled environmental factors / EMG lab. Temperature. 2. NCV : sensory segments:    Abnormal = the left median distal SCV SNAP is slowed with a slight decrease of SNAP amplitude. The left ulnar SCV SNAP is abnormal and that the amplitudes are markedly decreased of the SNAP with a normal SCV. Right ulnar right median bilateral radial SCB SNAP normal.  3. NCV transcarpal sensory segments:   Abnormal = left more than right SCV SNAP slowing with attenuation very slight of the median nerve transcarpal SNAP whereas bilateral ulnar transcarpal normal.  The left median/ulnar peak difference in latencies is 0.56 ms upper limit is 0.20 ms. On the right the median is slowed moderately with normal ulnar transcarpal and the peak difference of latency is 0.54 ms. 4. NCV Motor MCV segments:     Abnormal = left median terminal latency is 4.48 ms upper limit of normal is 4.15 ms in the amplitude is mildly reduced of the CMAP; there is also significant motor slowing of the MCV at the left ulnar both to the ADM and the FDI whereas the right ulnar is normal.  The right median although mild decrease of the CMAP amplitudes that latency and the conduction velocity is normal.  The right ulnar also is within normal limits.   5. F-wave studies: Basically normal bilateral median and ulnar F waves. 6. H-REFLEX Studies:    Deferred. 7.  NEEDLE EMG:   Tested muscles[de-identified]    Bilateral FCU FDI APB ADM = normal =Normal insertional activity and interference pattern/recruitment. No fasciculations fibrillations positive sharp waves. Normal MUP. No BSS AP. No giant MUP. No myotonia. No upper motor neuron sign. INTERPRETATION:     THESE FINDINGS ARE ABNORMAL ELECTROPHYSIOLOGIC TESTING RESULTS OF THE UPPER EXTREMITIES REVEALING LEFT MEDIAN DISTAL ENTRAPMENT AT THE CARPAL SEGMENT THAT IS MODERATE, LEFT ULNAR THAT IS EITHER ENTRAPPED OR COMPRESSION NEUROPATHY AT THE ELBOW. THERE IS ALSO VERY MILD AND EARLY RIGHT MEDIAN ENTRAPMENT AT THE WRIST. NO MYOPATHY OR MYOTONIA. NO FASCICULATIONS. NO OTHER NEUROPATHY OR DENERVATION IDENTIFIED. CONCLUSION:      Compatible with bilateral median neuropathies = carpal tunnels at the wrists with left worse than right; there is also left ulnar neuropathy at the elbow. Patient is prediabetic. Procedure Details: The above correlates with the clinical history/examination. He does have a moderate degree of hand pain that could be from arthralgia etc.  Patient made fully aware of above testing which reveals moderate left carpal tunnel syndrome and early right carpal tunnel syndrome. He is advised to avoid resting on his elbows since the left ulnar neuropathy is significant. He is returning tomorrow for EMG testing regarding his lower extremity complaints. Please Note[de-identified]     Data and waveforms * filed under Procedure category [facility]. See Procedure Files for complete data pages.                 Brendon Lieberman MD  Consultative Neurology, Neurodiagnostics   [facility]    [address] [place] [address]  Phone:  [phone]  Fax:   [phone]          + + +   Glossary:   MUP: motor unit potential;  SNAP: sensory nerve action potential; Fibs:  Fibrillations; Fascic: fasciculations; IA: insertional activity;  IP: interference pattern;  SCV :  Sensory conduction velocity;  MCV: motor conduction velocity; NOTE[de-identified] muscles are abbreviated latin initials. Nicolet raw datafile[de-identified]   * filed at Procedure or [employer].  *

## 2022-07-08 ENCOUNTER — OFFICE VISIT (OUTPATIENT)
Dept: NEUROLOGY | Age: 62
End: 2022-07-08
Payer: COMMERCIAL

## 2022-07-08 VITALS
SYSTOLIC BLOOD PRESSURE: 126 MMHG | BODY MASS INDEX: 35.92 KG/M2 | WEIGHT: 271 LBS | DIASTOLIC BLOOD PRESSURE: 83 MMHG | HEIGHT: 73 IN | HEART RATE: 72 BPM

## 2022-07-08 DIAGNOSIS — G62.9 NEUROPATHY: ICD-10-CM

## 2022-07-08 DIAGNOSIS — R20.2 PARESTHESIA: Primary | ICD-10-CM

## 2022-07-08 DIAGNOSIS — R20.0 NUMBNESS AND TINGLING OF BOTH FEET: ICD-10-CM

## 2022-07-08 DIAGNOSIS — R20.2 NUMBNESS AND TINGLING OF BOTH FEET: ICD-10-CM

## 2022-07-08 PROCEDURE — 95885 MUSC TST DONE W/NERV TST LIM: CPT | Performed by: PSYCHIATRY & NEUROLOGY

## 2022-07-08 PROCEDURE — 95913 NRV CNDJ TEST 13/> STUDIES: CPT | Performed by: PSYCHIATRY & NEUROLOGY

## 2022-07-08 PROCEDURE — 99213 OFFICE O/P EST LOW 20 MIN: CPT | Performed by: PSYCHIATRY & NEUROLOGY

## 2022-07-08 NOTE — PROGRESS NOTES
EMG/Nerve Conduction Study Procedure Note  [address]    [facility] [address] [place] [address] Ave   [phone]      Hx:    Exam:     57 y.o.y.o. male            Summary                 1.      Controlled environmental factors / EMG lab. Temperature. 2. NCV : sensory segments:      3. NCV transcarpal sensory segments:       4. NCV Motor MCV segments:       5. F-wave studies:           6. H-REFLEX Studies:      7. NEEDLE EMG:   Tested muscles[de-identified]          INTERPRETATION:                 CONCLUSION:            Procedure Details:                         Please Note[de-identified]     Data and waveforms * filed under Procedure category [facility]. See Procedure Files for complete data pages. Calixto Mcghee MD  Consultative Neurology, Neurodiagnostics   [facility]    [address] [facility] [address]   [address]  Phone:  [phone]  Fax:   [phone]          + + +   Glossary:   MUP: motor unit potential;  SNAP: sensory nerve action potential; Fibs:  Fibrillations; Fascic: fasciculations; IA: insertional activity;  IP: interference pattern;  SCV :  Sensory conduction velocity;  MCV: motor conduction velocity; NOTE[de-identified] muscles are abbreviated latin initials. Nicolet raw datafile[de-identified]   * filed at Procedure or [employer].  *

## 2022-07-08 NOTE — PATIENT INSTRUCTIONS
Patient Education        Learning About Peripheral Neuropathy  What is peripheral neuropathy? Peripheral neuropathy is a problem that affects the peripheral nerves. These nerves lead from the spinal cord to other parts of the body. They control yoursense of touch, how you feel pain and temperature, and your muscle strength. Most of the time the problem starts in the fingers and toes. As it gets worse, it moves into the limbs. It can cause pain and loss of feeling in the feet,legs, and hands. What causes it? There are several causes:   Diabetes. This is the most common cause. If your blood sugar is too high for too long, it can damage the nerves.  Kidney problems. These can lead to toxic substances in the blood that damage nerves.  Low levels of thyroid hormone (hypothyroidism). This may cause swelling of the tissues around the nerves, which can put pressure on them.  Infectious or inflammatory diseases. Examples are HIV and Guillain-Barré syndrome. These diseases can damage the nerves.  Peripheral nerve injury. A physical injury can damage the nerves. Injuries can be from things like falls, car crashes, or playing sports.  Overusing alcohol and not eating a healthy diet. These can lead to your body not having enough of certain vitamins, such as vitamin B-12. This can damage nerves.  Being exposed to toxic substances. These include lead, mercury, and certain medicines, such as those used for chemotherapy. Sometimes the cause isn't known. What are the symptoms? Symptoms of peripheral neuropathy can occur slowly over time. The most commonones are:   Numbness, tightness, and tingling, especially in the legs, hands, and feet.  Loss of feeling.  Burning, shooting, or stabbing pain in the legs, hands, and feet. Often the pain is worse at night.  Weakness and loss of balance. What can happen if you have it?   If peripheral neuropathy gets worse, it can lead to a complete lack of feeling in your hands or feet. This can make you more likely to injure them. It may lead to calluses and blisters. It can also lead to bone and joint problems,infection, and ulcers. For instance, small, repeated injuries to the foot may lead to bigger problems. This can happen because you can't feel the injuries. Reduced feeling in thefeet can also change your step, leading to bone or joint problems. If untreated, foot problems can become so severe that the foot or lower leg may have to be amputated. But treatment can slow down peripheral neuropathy. Andit's a good idea to take care to avoid injury. How is it diagnosed? To diagnose peripheral neuropathy, your doctor will ask you about:   Your symptoms.  Your medical history. This may include your use of alcohol, risk of HIV infection, or exposure to toxic substances.  Your family's medical history, including nerve disease. Your doctor may test how well you can feel touch, temperature, and pain. You may also have blood tests. These tests will help the doctor find out if you have conditions that can cause neuropathy. Examples are diabetes, vitamindeficiencies, thyroid disease, and kidney problems. How is it treated? Treatment for peripheral neuropathy can relieve symptoms. This is done by treating the health problem that's causing it. For example, if you have diabetes, keeping your blood sugar within your target range may help. Or maybe your body lacks certain vitamins caused by drinking too much alcohol. In that case, treatment may include eating a healthy diet, taking vitamins, andstopping alcohol use. You may have physical therapy. This can increase muscle strength and help build muscle control. Over-the-counter medicine can relieve mild nerve pain. Your doctor may also prescribe medicine to help with severe pain, numbness, tingling, and weakness. If you have neuropathy in your feet, it's a good ideato have them checked during each office visit.  This can help prevent problems. Some people find that physical therapy, acupuncture, or transcutaneouselectrical nerve stimulation (TENS) helps relieve pain. Follow-up care is a key part of your treatment and safety. Be sure to make and go to all appointments, and call your doctor if you are having problems. It's also a good idea to know your test results and keep alist of the medicines you take. Where can you learn more? Go to https://Ambient Control SystemspepeterRapleafeb.bTendo. org and sign in to your [employer] account. Enter P130 in the DataLocker box to learn more about \"Learning About Peripheral Neuropathy. \"     If you do not have an account, please click on the \"Sign Up Now\" link. Current as of: July 28, 2021               Content Version: 13.3  © 2006-2022 Healthwise, Incorporated. Care instructions adapted under license by [facility] ([facility]). If you have questions about a medical condition or this instruction, always ask your healthcare professional. [place] [address] any warranty or liability for your use of this information.

## 2022-07-12 ENCOUNTER — OFFICE VISIT (OUTPATIENT)
Dept: ORTHOPEDIC SURGERY | Age: 62
End: 2022-07-12
Payer: COMMERCIAL

## 2022-07-12 DIAGNOSIS — M65.9 TENOSYNOVITIS: ICD-10-CM

## 2022-07-12 DIAGNOSIS — M65.9 TENOSYNOVITIS: Primary | ICD-10-CM

## 2022-07-12 DIAGNOSIS — M65.331 TRIGGER MIDDLE FINGER OF RIGHT HAND: ICD-10-CM

## 2022-07-12 DIAGNOSIS — I48.11 LONGSTANDING PERSISTENT ATRIAL FIBRILLATION (HCC): ICD-10-CM

## 2022-07-12 DIAGNOSIS — G56.01 RIGHT CARPAL TUNNEL SYNDROME: ICD-10-CM

## 2022-07-12 DIAGNOSIS — G56.21 CUBITAL TUNNEL SYNDROME, RIGHT: ICD-10-CM

## 2022-07-12 DIAGNOSIS — G56.22 CUBITAL TUNNEL SYNDROME, LEFT: ICD-10-CM

## 2022-07-12 DIAGNOSIS — I10 PRIMARY HYPERTENSION: ICD-10-CM

## 2022-07-12 DIAGNOSIS — R73.03 PREDIABETES: ICD-10-CM

## 2022-07-12 DIAGNOSIS — G56.02 LEFT CARPAL TUNNEL SYNDROME: ICD-10-CM

## 2022-07-12 PROBLEM — M65.90 TENOSYNOVITIS: Status: ACTIVE | Noted: 2022-07-12

## 2022-07-12 PROCEDURE — 99214 OFFICE O/P EST MOD 30 MIN: CPT | Performed by: NURSE PRACTITIONER

## 2022-07-12 NOTE — H&P (VIEW-ONLY)
[facility] Note    Name: Ana Laura Harding  YOB: 1960  Gender: male  MRN: [id number]      Follow up visit:   1. Tenosynovitis    2. Trigger middle finger of right hand    3. Right carpal tunnel syndrome    4. Left carpal tunnel syndrome    5. Cubital tunnel syndrome, right    6. Cubital tunnel syndrome, left        HPI: Ana Laura Harding is a 57 y.o. male who is following up for bilateral carpal tunnel, bilateral cubital tunnel, right middle trigger finger. He is here to discuss his EMG results. The symptoms have been going on for 40 years. The patient does not  complain of night wakening and increased symptoms with driving. Evaluation to date has included bilateral carpal tunnel release in 1980 in [place]. He said after the surgery, which was performed by a knee surgeon, his numbness improved but the pain  has persisted. Treatment to date has included braces and surgery. The current symptoms are rated a 10/10 and interfere with sleep. He reports being worked up in the past for inflammatory disease. He said he tested negative for a muscle biopsy. He reports stiffness in his joints in the morning. He notes it takes him longer than an hour to loosen his joints up to get going. ROS/Meds/PSH/PMH/FH/SH: I personally reviewed the patients standard intake form. Pertinents are discussed in the HPI    Physical Examination:    Musculoskeletal Examination:  Cervical spine has normal range of motion without tenderness to palpation, negative Spurling's test. Shoulders and elbows have normal pain free range of motion. Examination of the Bilateral upper extremity demonstrates Decreased sensation to light touch in the median distribution, normal sensation in  ulnar and radial distribution, positive carpal tunnel compression testing and Phalen testing, cap refill < 5 seconds in all fingers. Inspection  reveals no thenar atrophy.  Negative Tinel and elbow flexion compression test of the cubital tunnel, negative Tinel over Guyon's canal. Sensation to light touch in the ulnar 2 digits is normal with no intrinsic atrophy/weakness. No tenderness to palpation  or masses noted in the forearm. positive tenderness of the right middle A1 pulley with palpable clicking and  positive  locking. The extensor tendons all track well over the MCP joints. Imaging / Electrodiagnostic Tests:     EMG results from Dr. Gonzalez office are reviewed. He has bilateral carpal tunnel syndrome as well as bilateral cubital tunnel syndrome. Assessment:     ICD-10-CM    1. Tenosynovitis  M65.9 Rheumatoid Factor     Uric Acid     CBC with Auto Differential     Comprehensive Metabolic Panel     C-Reactive Protein     Sedimentation Rate     BERONICA, Direct, w/Reflex     CCP Antibodies, IGG/IGA     HLA-B27 Antigen   2. Trigger middle finger of right hand  M65.331    3. Right carpal tunnel syndrome  G56.01    4. Left carpal tunnel syndrome  G56.02    5. Cubital tunnel syndrome, right  G56.21    6. Cubital tunnel syndrome, left  G56.22        Plan:   We discussed the diagnosis and different treatment options. We discussed observation, therapy, antiinflammatory medications and other pertinent treatment modalities. After discussing in detail the patient elects to proceed with surgical intervention. We will proceed with the right side first.  We will plan right carpal tunnel release, right cubital tunnel release, right middle trigger finger release. If he is happy with this side we will schedule for the left side. Dr. Dieudonne Berg has examined this patient and has discussed the plan of care as well. Patient voiced accordance and understanding of the information provided and the formulated plan. All questions were answered to the patient's satisfaction during the encounter.     Patient understands risks and benefits of RIGHT CARPAL TUNNEL RELEASE, RIGHT CUBITAL TUNNEL RELEASE, RIGHT MIDDLE TRIGGER FINGER RELEASE including but not limited to nerve injury, vessel injury, infection, failure to achieve desired results and possible need for additional surgery. Patient understands and wishes to proceed with surgery.      On Exam:   The patient is alert and oriented; ;   Lung auscultation is clear bilaterally   Heart has RRR without murmurs    4 This is a chronic illness with exacerbation, progression, or side effect of treatment  Treatment at this time: surgical intervention    BO Kruger - CNP  Orthopaedic Surgery  07/12/22  2:22 PM

## 2022-07-12 NOTE — PROGRESS NOTES
compression test of the cubital tunnel, negative Tinel over Guyon's canal. Sensation to light touch in the ulnar 2 digits is normal with no intrinsic atrophy/weakness. No tenderness to palpation  or masses noted in the forearm. positive tenderness of the right middle A1 pulley with palpable clicking and  positive  locking. The extensor tendons all track well over the MCP joints. Imaging / Electrodiagnostic Tests:     EMG results from Dr. Keegan sagastume are reviewed. He has bilateral carpal tunnel syndrome as well as bilateral cubital tunnel syndrome. Assessment:     ICD-10-CM    1. Tenosynovitis  M65.9 Rheumatoid Factor     Uric Acid     CBC with Auto Differential     Comprehensive Metabolic Panel     C-Reactive Protein     Sedimentation Rate     BERONICA, Direct, w/Reflex     CCP Antibodies, IGG/IGA     HLA-B27 Antigen   2. Trigger middle finger of right hand  M65.331    3. Right carpal tunnel syndrome  G56.01    4. Left carpal tunnel syndrome  G56.02    5. Cubital tunnel syndrome, right  G56.21    6. Cubital tunnel syndrome, left  G56.22        Plan:   We discussed the diagnosis and different treatment options. We discussed observation, therapy, antiinflammatory medications and other pertinent treatment modalities. After discussing in detail the patient elects to proceed with surgical intervention. We will proceed with the right side first.  We will plan right carpal tunnel release, right cubital tunnel release, right middle trigger finger release. If he is happy with this side we will schedule for the left side. Dr. Kavya Mix has examined this patient and has discussed the plan of care as well. Patient voiced accordance and understanding of the information provided and the formulated plan. All questions were answered to the patient's satisfaction during the encounter.     Patient understands risks and benefits of RIGHT CARPAL TUNNEL RELEASE, RIGHT CUBITAL TUNNEL RELEASE, RIGHT MIDDLE TRIGGER FINGER RELEASE including but not limited to nerve injury, vessel injury, infection, failure to achieve desired results and possible need for additional surgery. Patient understands and wishes to proceed with surgery.      On Exam:   The patient is alert and oriented; ;   Lung auscultation is clear bilaterally   Heart has RRR without murmurs    4 This is a chronic illness with exacerbation, progression, or side effect of treatment  Treatment at this time: surgical intervention    BO Ferreira - CNP  Orthopaedic Surgery  07/12/22  2:22 PM

## 2022-07-12 NOTE — LETTER
Name:     Yuliana Lee  :      1960  Age:        57 y.o. Gender:  male  Chart#:   [id number]        Date:  22    Requesting Physician:  Franklin Verdugo  Contact:  Loc Christensen    Date of Surgery or Procedure:   2022 right cubital tunnel release, right carpal tunnel release, right middle trigger finger release     Type of Anesthesia:   choice    Cardiac Clearance Needed:      THE MEDICATION TO BE HELD:   Eliquis      Days to Hold:   3-5 days    Comments:        Sent To:   Dr. Kaylie Wahl

## 2022-07-13 LAB
ALBUMIN SERPL-MCNC: 4 G/DL (ref 3.2–4.6)
ALBUMIN/GLOB SERPL: 1.4 {RATIO} (ref 1.2–3.5)
ALP SERPL-CCNC: 113 U/L (ref 50–136)
ALT SERPL-CCNC: 27 U/L (ref 12–65)
ANION GAP SERPL CALC-SCNC: 8 MMOL/L (ref 7–16)
AST SERPL-CCNC: 20 U/L (ref 15–37)
BASOPHILS # BLD: 0 K/UL (ref 0–0.2)
BASOPHILS NFR BLD: 0 % (ref 0–2)
BILIRUB SERPL-MCNC: 0.7 MG/DL (ref 0.2–1.1)
BUN SERPL-MCNC: 12 MG/DL (ref 8–23)
CALCIUM SERPL-MCNC: 9.7 MG/DL (ref 8.3–10.4)
CHLORIDE SERPL-SCNC: 108 MMOL/L (ref 98–107)
CHOLEST SERPL-MCNC: 137 MG/DL
CO2 SERPL-SCNC: 23 MMOL/L (ref 21–32)
CREAT SERPL-MCNC: 0.8 MG/DL (ref 0.8–1.5)
CRP SERPL-MCNC: <0.3 MG/DL (ref 0–0.9)
DIFFERENTIAL METHOD BLD: ABNORMAL
EOSINOPHIL # BLD: 0.3 K/UL (ref 0–0.8)
EOSINOPHIL NFR BLD: 3 % (ref 0.5–7.8)
ERYTHROCYTE [DISTWIDTH] IN BLOOD BY AUTOMATED COUNT: 12.8 % (ref 11.9–14.6)
ERYTHROCYTE [SEDIMENTATION RATE] IN BLOOD: 9 MM/HR
EST. AVERAGE GLUCOSE BLD GHB EST-MCNC: 123 MG/DL
GLOBULIN SER CALC-MCNC: 2.9 G/DL (ref 2.3–3.5)
GLUCOSE SERPL-MCNC: 93 MG/DL (ref 65–100)
HBA1C MFR BLD: 5.9 % (ref 4.8–5.6)
HCT VFR BLD AUTO: 40.2 % (ref 41.1–50.3)
HDLC SERPL-MCNC: 44 MG/DL (ref 40–60)
HDLC SERPL: 3.1 {RATIO}
HGB BLD-MCNC: 13.4 G/DL (ref 13.6–17.2)
IMM GRANULOCYTES # BLD AUTO: 0 K/UL (ref 0–0.5)
IMM GRANULOCYTES NFR BLD AUTO: 1 % (ref 0–5)
LDLC SERPL CALC-MCNC: 78.2 MG/DL
LYMPHOCYTES # BLD: 2.9 K/UL (ref 0.5–4.6)
LYMPHOCYTES NFR BLD: 35 % (ref 13–44)
MCH RBC QN AUTO: 30.3 PG (ref 26.1–32.9)
MCHC RBC AUTO-ENTMCNC: 33.3 G/DL (ref 31.4–35)
MCV RBC AUTO: 91 FL (ref 79.6–97.8)
MONOCYTES # BLD: 1 K/UL (ref 0.1–1.3)
MONOCYTES NFR BLD: 12 % (ref 4–12)
NEUTS SEG # BLD: 4.2 K/UL (ref 1.7–8.2)
NEUTS SEG NFR BLD: 49 % (ref 43–78)
NRBC # BLD: 0 K/UL (ref 0–0.2)
PLATELET # BLD AUTO: 253 K/UL (ref 150–450)
PMV BLD AUTO: 11.4 FL (ref 9.4–12.3)
POTASSIUM SERPL-SCNC: 3.8 MMOL/L (ref 3.5–5.1)
PROT SERPL-MCNC: 6.9 G/DL (ref 6.3–8.2)
RBC # BLD AUTO: 4.42 M/UL (ref 4.23–5.6)
RHEUMATOID FACT SER QL LA: NEGATIVE
SODIUM SERPL-SCNC: 139 MMOL/L (ref 136–145)
TRIGL SERPL-MCNC: 74 MG/DL (ref 35–150)
URATE SERPL-MCNC: 5.3 MG/DL (ref 2.6–6)
VLDLC SERPL CALC-MCNC: 14.8 MG/DL (ref 6–23)
WBC # BLD AUTO: 8.5 K/UL (ref 4.3–11.1)

## 2022-07-14 DIAGNOSIS — M65.331 TRIGGER MIDDLE FINGER OF RIGHT HAND: ICD-10-CM

## 2022-07-14 DIAGNOSIS — G56.21 CUBITAL TUNNEL SYNDROME, RIGHT: ICD-10-CM

## 2022-07-14 DIAGNOSIS — G56.01 RIGHT CARPAL TUNNEL SYNDROME: Primary | ICD-10-CM

## 2022-07-14 LAB — ANA SER QL: NEGATIVE

## 2022-07-15 LAB — CCP IGA+IGG SERPL IA-ACNC: 2 UNITS (ref 0–19)

## 2022-07-18 ENCOUNTER — OFFICE VISIT (OUTPATIENT)
Dept: CARDIOLOGY CLINIC | Age: 62
End: 2022-07-18
Payer: COMMERCIAL

## 2022-07-18 VITALS
HEIGHT: 73 IN | HEART RATE: 80 BPM | BODY MASS INDEX: 35.92 KG/M2 | DIASTOLIC BLOOD PRESSURE: 68 MMHG | SYSTOLIC BLOOD PRESSURE: 110 MMHG | WEIGHT: 271 LBS

## 2022-07-18 DIAGNOSIS — E78.2 MIXED HYPERLIPIDEMIA: ICD-10-CM

## 2022-07-18 DIAGNOSIS — Z98.890 H/O CARDIAC RADIOFREQUENCY ABLATION: ICD-10-CM

## 2022-07-18 DIAGNOSIS — I48.11 LONGSTANDING PERSISTENT ATRIAL FIBRILLATION (HCC): Primary | ICD-10-CM

## 2022-07-18 DIAGNOSIS — I10 ESSENTIAL HYPERTENSION: ICD-10-CM

## 2022-07-18 DIAGNOSIS — I26.94 MULTIPLE SUBSEGMENTAL PULMONARY EMBOLI WITHOUT ACUTE COR PULMONALE (HCC): ICD-10-CM

## 2022-07-18 PROCEDURE — 99214 OFFICE O/P EST MOD 30 MIN: CPT | Performed by: INTERNAL MEDICINE

## 2022-07-18 RX ORDER — METFORMIN HYDROCHLORIDE 500 MG/1
TABLET, EXTENDED RELEASE ORAL
Qty: 90 TABLET | Refills: 1 | Status: SHIPPED | OUTPATIENT
Start: 2022-07-18 | End: 2022-07-22

## 2022-07-18 ASSESSMENT — ENCOUNTER SYMPTOMS
COUGH: 0
GASTROINTESTINAL NEGATIVE: 1
WHEEZING: 0

## 2022-07-18 NOTE — PROGRESS NOTES
[facility], [address] [place] [address], [address] [place] [address], [address] [place] [address]      Patient:  Marycarmen Hayes  1960       SUBJECTIVE:  Marycarmen Hayes is a  57 y.o. male seen for a follow up visit regarding the following:     Chief Complaint   Patient presents with    Hypertension     6 month FU    Irregular Heart Beat     FU     CC: Atrial fibrillation, HTN, HLD follow up      HPI:  57 y.o. male with a history of atrial fibrillation with prior ablation x 2 in 2015, third ablation in 2016,  PE 1/27/22, HTN, HLD, GERD, obesity, COVID in June 2022, reported sarcoidosis remission in 2004, bronchoscopy who is here for follow up. Patient was last seen in office on 1/24/22 , since then reports that he has improved since having PE in January. He and his wife both had COVID in June 2022 but have improved from their symptoms, some fatigue but minimal at this time seems to have recovered back to baseline. Denies chest pain, chest pressure, irregular heartbeats, racing heart, palpitations, dizziness, lightheadedness, leg swelling, cough, wheezing. Taking his medications as directed without missing doses. Reports that he has easy bruising and bleeding with starting Eliquis, however has not had any major bleeding events. No other complaints at this time otherwise doing well. Cardiovascular Testing:  - Echo 1/28/22: LVEF >60%, RV normal, no hemodynamically significant valve abnormalities. - Atrial fibrillation ablation 12/22/2016 [address]  - Atrial Fibrillation ablation September 22, 2015 and December 22, 2015 with Dr. Damir Aguilar at [address] 12/22/2016 [address]: LVEF 55%, no evidence of left atrial appendage, left atrium, right atrium thrombus, small atrial septal defect left-to-right shunt.     Past medical history, past surgical history, family history, social history, and medications were all reviewed with the patient today and updated as necessary. Patient Active Problem List    Diagnosis Date Noted    Tenosynovitis 07/12/2022     Priority: Medium    Right carpal tunnel syndrome 07/12/2022     Priority: Medium    Left carpal tunnel syndrome 07/12/2022     Priority: Medium    Cubital tunnel syndrome, right 07/12/2022     Priority: Medium    Cubital tunnel syndrome, left 07/12/2022     Priority: Medium    Bilateral hand pain 07/07/2022     Priority: Medium    Paresthesia of both hands 07/07/2022     Priority: Medium    COVID-19 06/22/2022     Priority: Medium    Cough 06/21/2022     Priority: Medium    Bilateral carpal tunnel syndrome 04/18/2022    Bilateral leg numbness 04/14/2022    Trigger middle finger of right hand 04/14/2022    Sensorineural hearing loss (SNHL) of both ears 02/10/2022    Pulmonary embolism ([facility] 01/28/2022    Sarcoidosis     A-fib ([facility]     Prediabetes 01/19/2022    DANE (obstructive sleep apnea) 01/19/2022    Peripheral polyneuropathy 01/05/2022    Obesity (BMI 35.0-39.9 without comorbidity) 01/05/2022    Prostate cancer ([address] 01/05/2022    Bilateral hearing loss 01/05/2022    Preventative health care 01/05/2022    Gastroesophageal reflux disease with esophagitis 01/05/2022    Hypertension 01/05/2022     Patients blood pressure well controlled. Placed on antihypertensives for   peripheral neuropathy and leg edema. Transition from Triamterene likely in   the future. Lumbar spondylosis 01/05/2022    Hx of skin cancer, basal cell 01/05/2022       No family history on file. Social History     Tobacco Use    Smoking status: Former    Smokeless tobacco: Never   Substance Use Topics    Alcohol use: Yes     Alcohol/week: 3.0 standard drinks     Review of Systems   Constitutional: Negative for chills and fever. Cardiovascular:  Negative for chest pain, dyspnea on exertion, irregular heartbeat, leg swelling, near-syncope and palpitations. Respiratory:  Negative for cough and wheezing. Hematologic/Lymphatic: Negative for bleeding problem. Bruises/bleeds easily. Gastrointestinal: Negative. Neurological:  Positive for numbness (feet, follows with neurology). Negative for dizziness, light-headedness and loss of balance. PHYSICAL EXAM:  /68   Pulse 80   Ht 6' 1\" (1.854 m)   Wt 271 lb (122.9 kg)   BMI 35.75 kg/m²     Physical Exam  Vitals reviewed. Constitutional:       General: He is not in acute distress. Appearance: He is not toxic-appearing. HENT:      Head: Normocephalic. Cardiovascular:      Rate and Rhythm: Normal rate and regular rhythm. Heart sounds: Normal heart sounds. No murmur heard. No friction rub. No gallop. Pulmonary:      Effort: Pulmonary effort is normal. No respiratory distress. Breath sounds: Normal breath sounds. No stridor. No wheezing or rales. Musculoskeletal:         General: No swelling. Right lower leg: No edema. Left lower leg: No edema. Skin:     General: Skin is warm and dry. Findings: No bruising. Neurological:      Mental Status: He is alert and oriented to person, place, and time. Psychiatric:         Attention and Perception: Attention and perception normal.         Mood and Affect: Mood and affect normal.         Behavior: Behavior normal.         Judgment: Judgment normal.       Medical problems, medical history, and test results were reviewed with the patient today.      Recent Results (from the past 168 hour(s))   CCP Antibodies, IGG/IGA    Collection Time: 07/12/22  2:35 PM   Result Value Ref Range    CCP Antibodies IgG/IgA 2 0 - 19 units   BERONICA, Direct, w/Reflex    Collection Time: 07/12/22  2:35 PM   Result Value Ref Range    BERONICA Negative Negative     Sedimentation Rate    Collection Time: 07/12/22  2:35 PM   Result Value Ref Range    Sed Rate, Automated 9 (L) 15 mm/hr   C-Reactive Protein    Collection Time: 07/12/22  2:35 PM   Result Value Ref Range    CRP <0.3 0.0 - 0.9 mg/dL   CBC with Auto Differential    Collection Time: 07/12/22  2:35 PM   Result Value Ref Range    WBC 8.5 4.3 - 11.1 K/uL    RBC 4.42 4.23 - 5.6 M/uL    Hemoglobin 13.4 (L) 13.6 - 17.2 g/dL    Hematocrit 40.2 (L) 41.1 - 50.3 %    MCV 91.0 79.6 - 97.8 FL    MCH 30.3 26.1 - 32.9 PG    MCHC 33.3 31.4 - 35.0 g/dL    RDW 12.8 11.9 - 14.6 %    Platelets 503 443 - 548 K/uL    MPV 11.4 9.4 - 12.3 FL    nRBC 0.00 0.0 - 0.2 K/uL    Differential Type AUTOMATED      Seg Neutrophils 49 43 - 78 %    Lymphocytes 35 13 - 44 %    Monocytes 12 4.0 - 12.0 %    Eosinophils % 3 0.5 - 7.8 %    Basophils 0 0.0 - 2.0 %    Immature Granulocytes 1 0.0 - 5.0 %    Segs Absolute 4.2 1.7 - 8.2 K/UL    Absolute Lymph # 2.9 0.5 - 4.6 K/UL    Absolute Mono # 1.0 0.1 - 1.3 K/UL    Absolute Eos # 0.3 0.0 - 0.8 K/UL    Basophils Absolute 0.0 0.0 - 0.2 K/UL    Absolute Immature Granulocyte 0.0 0.0 - 0.5 K/UL   Uric Acid    Collection Time: 07/12/22  2:35 PM   Result Value Ref Range    Uric Acid 5.3 2.6 - 6.0 MG/DL   Rheumatoid Factor    Collection Time: 07/12/22  2:35 PM   Result Value Ref Range    Rheumatoid Factor Negative Negative     Comprehensive Metabolic Panel    Collection Time: 07/12/22  2:35 PM   Result Value Ref Range    Sodium 139 136 - 145 mmol/L    Potassium 3.8 3.5 - 5.1 mmol/L    Chloride 108 (H) 98 - 107 mmol/L    CO2 23 21 - 32 mmol/L    Anion Gap 8 7 - 16 mmol/L    Glucose 93 65 - 100 mg/dL    BUN 12 8 - 23 MG/DL    CREATININE 0.80 0.8 - 1.5 MG/DL    GFR African American >60 >60 ml/min/1.73m2    GFR Non- >60 >60 ml/min/1.73m2    Calcium 9.7 8.3 - 10.4 MG/DL    Total Bilirubin 0.7 0.2 - 1.1 MG/DL    ALT 27 12 - 65 U/L    AST 20 15 - 37 U/L    Alk Phosphatase 113 50 - 136 U/L    Total Protein 6.9 6.3 - 8.2 g/dL    Albumin 4.0 3.2 - 4.6 g/dL    Globulin 2.9 2.3 - 3.5 g/dL    Albumin/Globulin Ratio 1.4 1.2 - 3.5     Hemoglobin A1C    Collection Time: 07/12/22  2:35 PM   Result Value Ref Range    Hemoglobin A1C 5.9 (H) 4.8 - 5.6 %    eAG 123 mg/dL   Lipid Panel    Collection Time: 07/12/22  2:35 PM   Result Value Ref Range    Cholesterol, Total 137 <200 MG/DL    Triglycerides 74 35 - 150 MG/DL    HDL 44 40 - 60 MG/DL    LDL Calculated 78.2 <100 MG/DL    VLDL Cholesterol Calculated 14.8 6.0 - 23.0 MG/DL    Chol/HDL Ratio 3.1           Current Outpatient Medications:     losartan-hydroCHLOROthiazide (HYZAAR) 50-12.5 MG per tablet, TAKE 1 TABLET BY MOUTH EVERY DAY, Disp: 90 tablet, Rfl: 1    apixaban (ELIQUIS) 5 MG TABS tablet, Take 5 mg by mouth 2 times daily, Disp: , Rfl:     atorvastatin (LIPITOR) 40 MG tablet, Take 40 mg by mouth daily, Disp: , Rfl:     cyclobenzaprine (FLEXERIL) 5 MG tablet, Take 5 mg by mouth 3 times daily as needed, Disp: , Rfl:     esomeprazole (NEXIUM) 40 MG delayed release capsule, Take 40 mg by mouth daily, Disp: , Rfl:      ASSESSMENT/PLAN:    Cardiovascular Testing:  - Echo 1/28/22: LVEF >60%, RV normal, no hemodynamically significant valve abnormalities. - Atrial fibrillation ablation 12/22/2016 [address]  - Atrial Fibrillation ablation September 22, 2015 and December 22, 2015 with Dr. Norma Turner at [address] 12/22/[address]: LVEF 55%, no evidence of left atrial appendage, left atrium, right atrium thrombus, small atrial septal defect left-to-right shunt. 1. Longstanding persistent atrial fibrillation ([address]  2. H/O cardiac radiofrequency ablation  - current on Eliquis for PE indication, has not been on anticoagulation since ablations. 3. Essential hypertension  - controlled at this time on Losartan/HCTZ 50/12.5 mg    4. Mixed hyperlipidemia  - on atorvastatin 40 would continue     5.  Multiple subsegmental pulmonary emboli without acute cor pulmonale (HCC)  - on Eliquis   - follow up per PCP in August 2022     Labs from PCP dated 7/12/2022 personally

## 2022-07-21 PROBLEM — R05.9 COUGH: Status: RESOLVED | Noted: 2022-06-21 | Resolved: 2022-07-21

## 2022-07-21 LAB — HLA-B27 QL NAA+PROBE: NEGATIVE

## 2022-07-27 ENCOUNTER — ANESTHESIA EVENT (OUTPATIENT)
Dept: SURGERY | Age: 62
End: 2022-07-27
Payer: COMMERCIAL

## 2022-07-27 DIAGNOSIS — G56.21 CUBITAL TUNNEL SYNDROME, RIGHT: ICD-10-CM

## 2022-07-27 DIAGNOSIS — M65.331 TRIGGER MIDDLE FINGER OF RIGHT HAND: ICD-10-CM

## 2022-07-27 DIAGNOSIS — G56.01 RIGHT CARPAL TUNNEL SYNDROME: Primary | ICD-10-CM

## 2022-07-28 ENCOUNTER — HOSPITAL ENCOUNTER (OUTPATIENT)
Age: 62
Setting detail: OUTPATIENT SURGERY
Discharge: HOME | End: 2022-07-28
Attending: ORTHOPAEDIC SURGERY | Admitting: ORTHOPAEDIC SURGERY
Payer: COMMERCIAL

## 2022-07-28 ENCOUNTER — ANESTHESIA (OUTPATIENT)
Dept: SURGERY | Age: 62
End: 2022-07-28
Payer: COMMERCIAL

## 2022-07-28 VITALS
SYSTOLIC BLOOD PRESSURE: 137 MMHG | WEIGHT: 264 LBS | BODY MASS INDEX: 34.99 KG/M2 | RESPIRATION RATE: 16 BRPM | OXYGEN SATURATION: 95 % | HEART RATE: 57 BPM | DIASTOLIC BLOOD PRESSURE: 77 MMHG | HEIGHT: 73 IN | TEMPERATURE: 97.3 F

## 2022-07-28 LAB
GLUCOSE BLD STRIP.AUTO-MCNC: 94 MG/DL (ref 65–100)
POTASSIUM BLD-SCNC: 3.7 MMOL/L (ref 3.5–5.1)
SERVICE CMNT-IMP: NORMAL

## 2022-07-28 PROCEDURE — 2709999900 HC NON-CHARGEABLE SUPPLY: Performed by: ORTHOPAEDIC SURGERY

## 2022-07-28 PROCEDURE — 82962 GLUCOSE BLOOD TEST: CPT

## 2022-07-28 PROCEDURE — 3600000002 HC SURGERY LEVEL 2 BASE: Performed by: ORTHOPAEDIC SURGERY

## 2022-07-28 PROCEDURE — 3600000012 HC SURGERY LEVEL 2 ADDTL 15MIN: Performed by: ORTHOPAEDIC SURGERY

## 2022-07-28 PROCEDURE — 64415 NJX AA&/STRD BRCH PLXS IMG: CPT | Performed by: ANESTHESIOLOGY

## 2022-07-28 PROCEDURE — 7100000010 HC PHASE II RECOVERY - FIRST 15 MIN: Performed by: ORTHOPAEDIC SURGERY

## 2022-07-28 PROCEDURE — 3700000001 HC ADD 15 MINUTES (ANESTHESIA): Performed by: ORTHOPAEDIC SURGERY

## 2022-07-28 PROCEDURE — 88304 TISSUE EXAM BY PATHOLOGIST: CPT

## 2022-07-28 PROCEDURE — 26055 INCISE FINGER TENDON SHEATH: CPT | Performed by: ORTHOPAEDIC SURGERY

## 2022-07-28 PROCEDURE — 6360000002 HC RX W HCPCS: Performed by: ANESTHESIOLOGY

## 2022-07-28 PROCEDURE — 84132 ASSAY OF SERUM POTASSIUM: CPT

## 2022-07-28 PROCEDURE — 3700000000 HC ANESTHESIA ATTENDED CARE: Performed by: ORTHOPAEDIC SURGERY

## 2022-07-28 PROCEDURE — 64718 REVISE ULNAR NERVE AT ELBOW: CPT | Performed by: ORTHOPAEDIC SURGERY

## 2022-07-28 PROCEDURE — 2580000003 HC RX 258: Performed by: ANESTHESIOLOGY

## 2022-07-28 PROCEDURE — 25115 REMOVE WRIST/FOREARM LESION: CPT | Performed by: ORTHOPAEDIC SURGERY

## 2022-07-28 PROCEDURE — 6370000000 HC RX 637 (ALT 250 FOR IP): Performed by: ANESTHESIOLOGY

## 2022-07-28 PROCEDURE — 2500000003 HC RX 250 WO HCPCS: Performed by: ANESTHESIOLOGY

## 2022-07-28 PROCEDURE — 6360000002 HC RX W HCPCS: Performed by: NURSE PRACTITIONER

## 2022-07-28 PROCEDURE — 88313 SPECIAL STAINS GROUP 2: CPT

## 2022-07-28 PROCEDURE — 7100000001 HC PACU RECOVERY - ADDTL 15 MIN: Performed by: ORTHOPAEDIC SURGERY

## 2022-07-28 PROCEDURE — 88305 TISSUE EXAM BY PATHOLOGIST: CPT

## 2022-07-28 PROCEDURE — 6360000002 HC RX W HCPCS: Performed by: NURSE ANESTHETIST, CERTIFIED REGISTERED

## 2022-07-28 PROCEDURE — 7100000000 HC PACU RECOVERY - FIRST 15 MIN: Performed by: ORTHOPAEDIC SURGERY

## 2022-07-28 RX ORDER — SODIUM CHLORIDE 9 MG/ML
INJECTION, SOLUTION INTRAVENOUS PRN
Status: DISCONTINUED | OUTPATIENT
Start: 2022-07-28 | End: 2022-07-28 | Stop reason: HOSPADM

## 2022-07-28 RX ORDER — PROCHLORPERAZINE EDISYLATE 5 MG/ML
5 INJECTION INTRAMUSCULAR; INTRAVENOUS
Status: DISCONTINUED | OUTPATIENT
Start: 2022-07-28 | End: 2022-07-28 | Stop reason: HOSPADM

## 2022-07-28 RX ORDER — FENTANYL CITRATE 50 UG/ML
100 INJECTION, SOLUTION INTRAMUSCULAR; INTRAVENOUS
Status: COMPLETED | OUTPATIENT
Start: 2022-07-28 | End: 2022-07-28

## 2022-07-28 RX ORDER — SODIUM CHLORIDE 0.9 % (FLUSH) 0.9 %
5-40 SYRINGE (ML) INJECTION PRN
Status: DISCONTINUED | OUTPATIENT
Start: 2022-07-28 | End: 2022-07-28

## 2022-07-28 RX ORDER — LIDOCAINE HYDROCHLORIDE 10 MG/ML
1 INJECTION, SOLUTION INFILTRATION; PERINEURAL
Status: DISCONTINUED | OUTPATIENT
Start: 2022-07-28 | End: 2022-07-28 | Stop reason: HOSPADM

## 2022-07-28 RX ORDER — SODIUM CHLORIDE 0.9 % (FLUSH) 0.9 %
5-40 SYRINGE (ML) INJECTION PRN
Status: DISCONTINUED | OUTPATIENT
Start: 2022-07-28 | End: 2022-07-28 | Stop reason: HOSPADM

## 2022-07-28 RX ORDER — DEXTROSE MONOHYDRATE 100 MG/ML
INJECTION, SOLUTION INTRAVENOUS CONTINUOUS PRN
Status: DISCONTINUED | OUTPATIENT
Start: 2022-07-28 | End: 2022-07-28 | Stop reason: HOSPADM

## 2022-07-28 RX ORDER — SODIUM CHLORIDE 0.9 % (FLUSH) 0.9 %
5-40 SYRINGE (ML) INJECTION EVERY 12 HOURS SCHEDULED
Status: DISCONTINUED | OUTPATIENT
Start: 2022-07-28 | End: 2022-07-28 | Stop reason: HOSPADM

## 2022-07-28 RX ORDER — SODIUM CHLORIDE 0.9 % (FLUSH) 0.9 %
5-40 SYRINGE (ML) INJECTION EVERY 12 HOURS SCHEDULED
Status: DISCONTINUED | OUTPATIENT
Start: 2022-07-28 | End: 2022-07-28

## 2022-07-28 RX ORDER — DEXAMETHASONE SODIUM PHOSPHATE 4 MG/ML
INJECTION, SOLUTION INTRA-ARTICULAR; INTRALESIONAL; INTRAMUSCULAR; INTRAVENOUS; SOFT TISSUE
Status: COMPLETED | OUTPATIENT
Start: 2022-07-28 | End: 2022-07-28

## 2022-07-28 RX ORDER — SODIUM CHLORIDE, SODIUM LACTATE, POTASSIUM CHLORIDE, CALCIUM CHLORIDE 600; 310; 30; 20 MG/100ML; MG/100ML; MG/100ML; MG/100ML
INJECTION, SOLUTION INTRAVENOUS CONTINUOUS
Status: DISCONTINUED | OUTPATIENT
Start: 2022-07-28 | End: 2022-07-28 | Stop reason: HOSPADM

## 2022-07-28 RX ORDER — PROPOFOL 10 MG/ML
INJECTION, EMULSION INTRAVENOUS PRN
Status: DISCONTINUED | OUTPATIENT
Start: 2022-07-28 | End: 2022-07-28 | Stop reason: SDUPTHER

## 2022-07-28 RX ORDER — PROPOFOL 10 MG/ML
INJECTION, EMULSION INTRAVENOUS CONTINUOUS PRN
Status: DISCONTINUED | OUTPATIENT
Start: 2022-07-28 | End: 2022-07-28 | Stop reason: SDUPTHER

## 2022-07-28 RX ORDER — MIDAZOLAM HYDROCHLORIDE 2 MG/2ML
2 INJECTION, SOLUTION INTRAMUSCULAR; INTRAVENOUS
Status: COMPLETED | OUTPATIENT
Start: 2022-07-28 | End: 2022-07-28

## 2022-07-28 RX ORDER — ACETAMINOPHEN 500 MG
1000 TABLET ORAL ONCE
Status: COMPLETED | OUTPATIENT
Start: 2022-07-28 | End: 2022-07-28

## 2022-07-28 RX ORDER — LIDOCAINE HYDROCHLORIDE AND EPINEPHRINE 20; 5 MG/ML; UG/ML
INJECTION, SOLUTION EPIDURAL; INFILTRATION; INTRACAUDAL; PERINEURAL PRN
Status: DISCONTINUED | OUTPATIENT
Start: 2022-07-28 | End: 2022-07-28 | Stop reason: SDUPTHER

## 2022-07-28 RX ORDER — HYDROMORPHONE HYDROCHLORIDE 2 MG/ML
0.5 INJECTION, SOLUTION INTRAMUSCULAR; INTRAVENOUS; SUBCUTANEOUS EVERY 10 MIN PRN
Status: DISCONTINUED | OUTPATIENT
Start: 2022-07-28 | End: 2022-07-28 | Stop reason: HOSPADM

## 2022-07-28 RX ORDER — OXYCODONE HYDROCHLORIDE 5 MG/1
5 TABLET ORAL
Status: COMPLETED | OUTPATIENT
Start: 2022-07-28 | End: 2022-07-28

## 2022-07-28 RX ORDER — HYDROCODONE BITARTRATE AND ACETAMINOPHEN 5; 325 MG/1; MG/1
1 TABLET ORAL EVERY 6 HOURS PRN
Qty: 28 TABLET | Refills: 0 | Status: SHIPPED | OUTPATIENT
Start: 2022-07-28 | End: 2022-08-02

## 2022-07-28 RX ORDER — DIPHENHYDRAMINE HYDROCHLORIDE 50 MG/ML
12.5 INJECTION INTRAMUSCULAR; INTRAVENOUS
Status: DISCONTINUED | OUTPATIENT
Start: 2022-07-28 | End: 2022-07-28 | Stop reason: HOSPADM

## 2022-07-28 RX ORDER — SODIUM CHLORIDE 9 MG/ML
INJECTION, SOLUTION INTRAVENOUS PRN
Status: DISCONTINUED | OUTPATIENT
Start: 2022-07-28 | End: 2022-07-28

## 2022-07-28 RX ORDER — ONDANSETRON 4 MG/1
4 TABLET, ORALLY DISINTEGRATING ORAL EVERY 8 HOURS PRN
Qty: 20 TABLET | Refills: 0 | Status: SHIPPED | OUTPATIENT
Start: 2022-07-28 | End: 2022-08-25

## 2022-07-28 RX ORDER — ROPIVACAINE HYDROCHLORIDE 5 MG/ML
INJECTION, SOLUTION EPIDURAL; INFILTRATION; PERINEURAL
Status: COMPLETED | OUTPATIENT
Start: 2022-07-28 | End: 2022-07-28

## 2022-07-28 RX ADMIN — MIDAZOLAM 2 MG: 1 INJECTION INTRAMUSCULAR; INTRAVENOUS at 09:58

## 2022-07-28 RX ADMIN — ROPIVACAINE HYDROCHLORIDE 25 ML: 5 INJECTION, SOLUTION EPIDURAL; INFILTRATION; PERINEURAL at 09:58

## 2022-07-28 RX ADMIN — SODIUM CHLORIDE, SODIUM LACTATE, POTASSIUM CHLORIDE, AND CALCIUM CHLORIDE: 600; 310; 30; 20 INJECTION, SOLUTION INTRAVENOUS at 09:30

## 2022-07-28 RX ADMIN — LIDOCAINE HYDROCHLORIDE,EPINEPHRINE BITARTRATE 5 ML: 20; .005 INJECTION, SOLUTION EPIDURAL; INFILTRATION; INTRACAUDAL; PERINEURAL at 09:58

## 2022-07-28 RX ADMIN — DEXAMETHASONE SODIUM PHOSPHATE 4 MG: 4 INJECTION, SOLUTION INTRAMUSCULAR; INTRAVENOUS at 09:58

## 2022-07-28 RX ADMIN — Medication 2 G: at 10:54

## 2022-07-28 RX ADMIN — PROPOFOL 60 MG: 10 INJECTION, EMULSION INTRAVENOUS at 10:51

## 2022-07-28 RX ADMIN — PROPOFOL 160 MCG/KG/MIN: 10 INJECTION, EMULSION INTRAVENOUS at 10:51

## 2022-07-28 RX ADMIN — ACETAMINOPHEN 1000 MG: 500 TABLET, FILM COATED ORAL at 09:50

## 2022-07-28 RX ADMIN — OXYCODONE 5 MG: 5 TABLET ORAL at 11:53

## 2022-07-28 RX ADMIN — FENTANYL CITRATE 100 MCG: 50 INJECTION, SOLUTION INTRAMUSCULAR; INTRAVENOUS at 09:58

## 2022-07-28 ASSESSMENT — PAIN SCALES - GENERAL: PAINLEVEL_OUTOF10: 6

## 2022-07-28 ASSESSMENT — PAIN DESCRIPTION - LOCATION: LOCATION: ARM

## 2022-07-28 ASSESSMENT — PAIN DESCRIPTION - ORIENTATION: ORIENTATION: RIGHT

## 2022-07-28 NOTE — INTERVAL H&P NOTE
H&P Update:  Eden Kelley was seen and examined. History and physical has been reviewed. The patient has been examined.  There have been no significant clinical changes since the completion of the originally dated History and Physical.    Jeanie Davis MD  Orthopaedic Surgery  07/28/22  9:39 AM

## 2022-07-28 NOTE — ANESTHESIA POSTPROCEDURE EVALUATION
Department of Anesthesiology  Postprocedure Note    Patient: Corazon Ramos  MRN: [id number]  YOB: 1960  Date of evaluation: 7/28/2022      Procedure Summary     Date: 07/28/22 Room / Location: [facility] OP OR 07 / [facility]    Anesthesia Start: 9071 Anesthesia Stop: 1137    Procedures:       right cubital tunnel release (Right: Elbow)      right carpal tunnel release and flexor synovectomy (Right: Hand)      right middle trigger finger release (Right: Fingers) Diagnosis:       Carpal tunnel syndrome of right wrist      Cubital tunnel syndrome on right      Acquired trigger finger of right middle finger      (Carpal tunnel syndrome of right wrist [G56.01])      (Cubital tunnel syndrome on right [G56.21])      (Acquired trigger finger of right middle finger [M65.331])    Surgeons: Isabel Penaloza MD Responsible Provider: Brendon Layton MD    Anesthesia Type: TIVA ASA Status: 3          Anesthesia Type: TIVA    Jessica Phase I: Jessica Score: 7    Jessica Phase II: Jessica Score: 9      Anesthesia Post Evaluation    Patient location during evaluation: PACU  Patient participation: complete - patient participated  Level of consciousness: awake and alert  Airway patency: patent  Nausea: well controlled. Complications: no  Cardiovascular status: acceptable.   Respiratory status: acceptable  Hydration status: stable

## 2022-07-28 NOTE — OP NOTE
Hand Surgery Operative Note      Jenae Hagen   57 y.o.   male  7/28/2022      Pre-op diagnosis: Right recurrent Carpal Tunnel syndrome, right cubital tunnel syndrome, right middle trigger finger release  Post op diagnosis: same      Procedure: Right revision Carpal Tunnel release, extensive flexor synovectomy and biopsy, right cubital tunnel release, right middle trigger finger release     Surgeon: Ciro Burroughs MD, PhD      Anesthesia: [place] block      Tourniquet time:   Total Tourniquet Time Documented:  Arm  (Right) - 34 minutes  Total: Arm  (Right) - 34 minutes        Procedure indications: Patient with recurrent carpal tunnel symptoms after a significant symptom-free period following surgery, symptoms are recalcitrant to conservative measures with positive documentation of NCV findings consistent with carpal tunnel syndrome. After Thorough discussion, the patient decided to proceed with surgical management. We discussed in detail surgical risks including scar, pain, bleeding, infection, anesthetic risks, neurovascular injury, need for further surgery,  weakness, stiffness, risk of death and potential risk of other unforseen complication. Procedure description:      Patient was placed in the supine position and after appropriate time-out and side, site and procedure confirmed. An extesile incision was made in the palm in line with the radial border of the ring finger under loupe magnification and palmar fascia was incised longitudinally. Blunt retraction used to identify the transverse carpal ligament, which was incised, visualizing the median nerve beneath, which was protected with a slotted freer. The remaining ligament was released with a [place] meniscal blade under direct visualization. The ligament was released in its entirety, and visualized at its most proximal and distal extent.  The nerve was carefully dissected, there was significant adhesion between the nerve and the transverse carpal ligament, careful dissection was done, this was a very meticulous and slow part of the procedure which required significant effort and double the time of the operation. After the nerve was fully released from the scar tissue adhered to the transverse carpal ligament it was retracted radially and extensive flexor synovectomy was performed with sharp scissors on each flexor tendon sheath until all tendons exhibited and smooth gliding surface, significant synovitis was found. All resected material was sent for biopsy and amyloid staining. Wound was irrigated, tourniquet released and hemostasis was obtained with bipolar cautery. Skin edges were infiltrated with . 25% Bupivacaine. Wound then closed with 4-0 prolene and sterile dressing applied. Attention was brought to the right middle trigger finger  A longitudinal incision was made along the right middle finger A1 pulley under loupe magnification. Blunt dissection was used to identify the A1 pulley as well as the neurovascular bundle on each side of the flexor tendon. Blunt retraction was used to protect the neurovascular bundles. Sharp incision was made on top of the A1 pulley and scissor dissection was used to extend the sheath incision proximally to release the palmar pulley and distally until the A2 pulley was encountered. The flexor tendons were then mobilized and not catching or clicking was identified. Wound was irrigated and hemostasis was obtained with bipolar cautery. Wound then closed with 4-0 prolene and sterile dressing applied. Attention was brought to the cubital tunnel release, incision was made in the medial border of the elbow adjacent to the medial epicondyle under loupe magnification, blunt dissection was performed with care to preserve superficial sensory branches, cubital tunnel fascia was incised longitudinally. Blunt retraction used to identify the ulnar nerve.  Lawson ligament and fascia were released in its entirety, the nerve was visualized at its most proximal (along the medial intermuscular septum) and distal extent of the cubital tunnel (under the two heads of the FCU). The medial intermuscular septum was inspected and did not cause further compression. The elbow was taken through a range of motion and the nerve was not dislocating. The decision was made not to transpose the nerve. Wound was irrigated, tourniquet released and hemostasis was obtained with bipolar cautery. Skin edges were infiltrated with . 25% sensorcaine with epinepherine. Wound then closed with 3.0 Vicryl and running 4-0 Stratafix and sterile dressing applied. Disposition: To PACU with no complications and follow up per routine. Patient is instructed to remove dressings in five days and other precautions include avoidance of heavy and repetitive lifting for 2 weeks, when an appointment for follow up and suture removal will take place.      Diana Villagran MD  07/28/22  1:45 PM

## 2022-07-28 NOTE — ANESTHESIA PRE PROCEDURE
Department of Anesthesiology  Preprocedure Note       Name:  Nilton Humphreys   Age:  57 y.o.  :  1960                                          MRN:  [id number]         Date:  2022      Surgeon: Sandip Greene):  Sadie Hernandez MD    Procedure: Procedure(s):  right cubital tunnel release  right carpal tunnel release  right middle trigger finger release    Medications prior to admission:   Prior to Admission medications    Medication Sig Start Date End Date Taking? Authorizing Provider   losartan-hydroCHLOROthiazide (HYZAAR) 50-12.5 MG per tablet TAKE 1 TABLET BY MOUTH EVERY DAY 22   Nelson Mike,    apixaban (ELIQUIS) 5 MG TABS tablet Take 5 mg by mouth 2 times daily 22   Ar Automatic Reconciliation   atorvastatin (LIPITOR) 40 MG tablet Take 40 mg by mouth daily 22   Ar Automatic Reconciliation   cyclobenzaprine (FLEXERIL) 5 MG tablet Take 5 mg by mouth 3 times daily as needed 22   Ar Automatic Reconciliation   esomeprazole (NEXIUM) 40 MG delayed release capsule Take 40 mg by mouth daily 22   Ar Automatic Reconciliation       Current medications:    Current Facility-Administered Medications   Medication Dose Route Frequency Provider Last Rate Last Admin    ceFAZolin (ANCEF) 2000 mg in sterile water 20 mL IV syringe  2,000 mg IntraVENous On Call to [address], APRN - CNP        sodium chloride flush 0.9 % injection 5-40 mL  5-40 mL IntraVENous 2 times per day BO Sun - CNP        sodium chloride flush 0.9 % injection 5-40 mL  5-40 mL IntraVENous PRN BO Varma - CNP        0.9 % sodium chloride infusion   IntraVENous PRN Peggy Woodward APRN - CNP        lidocaine 1 % injection 1 mL  1 mL IntraDERmal Once PRN Liyah Pablo MD        lactated ringers infusion   IntraVENous Continuous Liyah Pablo  mL/hr at 22 New Bag at 22 09       Allergies:     Allergies   Allergen Reactions    Clavulanic Acid     Amoxicillin Rash Problem List:    Patient Active Problem List   Diagnosis Code    Sarcoidosis D86.9    Prediabetes R73.03    Peripheral polyneuropathy G62.9    Obesity (BMI 35.0-39.9 without comorbidity) E66.9    Prostate cancer ([facility] C61    Bilateral hearing loss H91.93    Sensorineural hearing loss (SNHL) of both ears H90.3    Preventative health care Z00.00    A-fib ([facility]) I48.91    Pulmonary embolism (HCC) I26.99    Gastroesophageal reflux disease with esophagitis K21.00    Hypertension I10    Lumbar spondylosis M47.816    Hx of skin cancer, basal cell Z85.828    DANE (obstructive sleep apnea) G47.33    Bilateral leg numbness R20.0    Trigger middle finger of right hand M65.331    Bilateral carpal tunnel syndrome G56.03    COVID-19 U07.1    Bilateral hand pain M79.641, M79.642    Paresthesia of both hands R20.2    Tenosynovitis M65.9    Right carpal tunnel syndrome G56.01    Left carpal tunnel syndrome G56.02    Cubital tunnel syndrome, right G56.21    Cubital tunnel syndrome, left G56.22       Past Medical History:        Diagnosis Date    A-fib ([facility]     managed with med    BPH (benign prostatic hyperplasia)     Cancer (HCC)     basal  cell    Hx of blood clots     Hypertension     DANE (obstructive sleep apnea)     Personal history of prostate cancer 2012    Pulmonary emboli ([facility] 01/2022    Bilateral    Sarcoidosis        Past Surgical History:        Procedure Laterality Date    AV NODE ABLATION      2015 x 2 and 2016 (a total of 3)    CARPAL TUNNEL RELEASE Left     MANDIBLE SURGERY      ORTHOPEDIC SURGERY      3 knee surgeries right knee 1 surgery left knee       Social History:    Social History     Tobacco Use    Smoking status: Former    Smokeless tobacco: Never   Substance Use Topics    Alcohol use: Yes     Comment: ocasionally                                Counseling given: Not Answered      Vital Signs (Current):   Vitals:    07/22/22 1452 07/28/22 0935 07/28/22 7580 BP:  113/74    Pulse:  64 64   Resp:  18 17   Temp:  98.7 °F (37.1 °C)    TempSrc:  Oral    SpO2:  97% 98%   Weight: 264 lb (119.7 kg) 264 lb (119.7 kg)    Height: 6' 1\" (1.854 m)                                                BP Readings from Last 3 Encounters:   07/28/22 113/74   07/18/22 110/68   07/08/22 126/83       NPO Status:                                                                                 BMI:   Wt Readings from Last 3 Encounters:   07/28/22 264 lb (119.7 kg)   07/18/22 271 lb (122.9 kg)   07/08/22 271 lb (122.9 kg)     Body mass index is 34.83 kg/m².     CBC:   Lab Results   Component Value Date/Time    WBC 8.5 07/12/2022 02:35 PM    RBC 4.42 07/12/2022 02:35 PM    HGB 13.4 07/12/2022 02:35 PM    HCT 40.2 07/12/2022 02:35 PM    MCV 91.0 07/12/2022 02:35 PM    RDW 12.8 07/12/2022 02:35 PM     07/12/2022 02:35 PM       CMP:   Lab Results   Component Value Date/Time     07/12/2022 02:35 PM    K 3.8 07/12/2022 02:35 PM     07/12/2022 02:35 PM    CO2 23 07/12/2022 02:35 PM    BUN 12 07/12/2022 02:35 PM    CREATININE 0.80 07/12/2022 02:35 PM    GFRAA >60 07/12/2022 02:35 PM    AGRATIO 2.1 04/14/2022 12:01 PM    LABGLOM >60 07/12/2022 02:35 PM    GLUCOSE 93 07/12/2022 02:35 PM    PROT 6.9 07/12/2022 02:35 PM    CALCIUM 9.7 07/12/2022 02:35 PM    BILITOT 0.7 07/12/2022 02:35 PM    ALKPHOS 113 07/12/2022 02:35 PM    ALKPHOS 115 04/14/2022 12:01 PM    AST 20 07/12/2022 02:35 PM    ALT 27 07/12/2022 02:35 PM       POC Tests:   Recent Labs     07/28/22  0938 07/28/22  0942   POCGLU 94  --    POCK  --  3.7       Coags:   Lab Results   Component Value Date/Time    PROTIME 13.3 01/27/2022 09:50 PM    INR 1.0 01/27/2022 09:50 PM    APTT 30.1 01/27/2022 09:50 PM       HCG (If Applicable): No results found for: PREGTESTUR, PREGSERUM, HCG, HCGQUANT     ABGs: No results found for: PHART, PO2ART, UXY1DWH, UQP9DKE, BEART, Z4KZAWAS     Type & Screen (If Applicable):  No results found for: [facility]    Drug/Infectious Status (If Applicable):  No results found for: HIV, HEPCAB    COVID-19 Screening (If Applicable):   Lab Results   Component Value Date/Time    COVID19 Not detected 01/12/2022 12:00 PM           Anesthesia Evaluation  Patient summary reviewed and Nursing notes reviewed history of anesthetic complications (\"takes extra medicine to sedate or put to sleep\"): Airway: Mallampati: II  TM distance: >3 FB   Neck ROM: full  Mouth opening: > = 3 FB   Dental: normal exam         Pulmonary: breath sounds clear to auscultation  (+) sleep apnea: on CPAP,                            ROS comment: Mild scarring of lungs from sarcoid   Cardiovascular:  Exercise tolerance: good (>4 METS), Denied recent chest pain, SOB, syncope   (+) hypertension:, dysrhythmias (ablation x 3): atrial fibrillation, hyperlipidemia        Rhythm: regular  Rate: normal                    Neuro/Psych:                ROS comment: Neuropathy  GI/Hepatic/Renal:   (+) GERD: well controlled,           Endo/Other:    (+) : arthritis:., .                  ROS comment: Sarcoidosis - not on immunosuppressants  Abdominal:             Vascular:   + DVT, PE (DVT/PE - normally on eliquis but held for a few days). Other Findings:           Anesthesia Plan      TIVA     ASA 3     (PNB + TIVA)  Induction: intravenous. Anesthetic plan and risks discussed with patient and spouse.                         Diego Crenshaw MD   7/28/2022

## 2022-07-28 NOTE — DISCHARGE INSTRUCTIONS
Postoperative  Instructions:      Weightbearing or Lifting:  Limit  weight  lifting  to  less  than  1  pound  (coffee  mug)  for  the  first  2  weeks  after  surgery. Dressing  instructions:    Keep  your  dressing  and/or  splint  clean  and  dry  at  all  times. You  can  remove  your  dressing  on  post-operative  day  #5  and  change  with  a  dry/sterile  dressing  or  Band-Aids  as  needed  thereafter. Showering  Instructions:  May  shower  But keep surgical dressing clean and dry until removed as explained above. After dressing is removed, you may allow soapy water to run through the incision during showers but do not scrub. After each shower, pat dry and apply a dry dressing. Do  not  soak  your  Incision in still water or bathtub  for  3  weeks  after  surgery. If  the  incision  gets  wet otherwise,  pat  dry  and  do  not  scrub  the  incision. Do  not  apply  cream  or  lotion  to  incision      Pain  Control:  - You  have  been  given  a  prescription  to  be  taken  as  directed  for  post-operative  pain  control. In  addition,  elevate  the  operative  extremity  above  the  heart  at  all  times  to  prevent  swelling  and  throbbing  pain. - If you develop constipation while taking narcotic pain medications (Norco, Hydrocodone, Percocet, Oxycodone, Dilaudid, Hydromorphone) take  over-the-counter  Colace,  100mg  by  mouth  twice  a  Day. - Nausea  is  a  common  side  effect  of  many  pain  medications. You  will  want  to  eat something  before  taking  your  pain  medicine  to  help  prevent  Nausea. - If  you  are  taking  a  prescription  pain  medication  that  contains  acetaminophen,  we  recommend  that  you  do  not  take  additional  over  the  counter  acetaminophen  (Tylenol®).       Other  pain  relieving  options:   - Using  a  cold  pack  to  ice  the  affected  area  a  few  times  a  day  (15  to  20  minutes  at  a  time)  can  help  to  relieve and beyond to ensure you receive the best care possible. Fidelina Knight MD, PhD  MEDICATION INTERACTION:  During your procedure you potentially received a medication or medications which may reduce the effectiveness of oral contraceptives. Please consider other forms of contraception for 1 month following your procedure if you are currently using oral contraceptives as your primary form of birth control. In addition to this, we recommend continuing your oral contraceptive as prescribed, unless otherwise instructed by your physician, during this time    After general anesthesia or intravenous sedation, for 24 hours or while taking prescription Narcotics:  Limit your activities  A responsible adult needs to be with you for the next 24 hours  Do not drive and operate hazardous machinery  Do not make important personal or business decisions  Do not drink alcoholic beverages  If you have not urinated within 8 hours after discharge, and you are experiencing discomfort from urinary retention, please go to the nearest ED. If you have sleep apnea and have a CPAP machine, please use it for all naps and sleeping. Please use caution when taking narcotics and any of your home medications that may cause drowsiness. *  Please give a list of your current medications to your Primary Care Provider. *  Please update this list whenever your medications are discontinued, doses are      changed, or new medications (including over-the-counter products) are added. *  Please carry medication information at all times in case of emergency situations. These are general instructions for a healthy lifestyle:  No smoking/ No tobacco products/ Avoid exposure to second hand smoke  Surgeon General's Warning:  Quitting smoking now greatly reduces serious risk to your health.   Obesity, smoking, and sedentary lifestyle greatly increases your risk for illness  A healthy diet, regular physical exercise & weight monitoring are important for maintaining a healthy lifestyle    You may be retaining fluid if you have a history of heart failure or if you experience any of the following symptoms:  Weight gain of 3 pounds or more overnight or 5 pounds in a week, increased swelling in our hands or feet or shortness of breath while lying flat in bed. Please call your doctor as soon as you notice any of these symptoms; do not wait until your next office visit.

## 2022-08-02 ENCOUNTER — TELEPHONE (OUTPATIENT)
Dept: ORTHOPEDIC SURGERY | Age: 62
End: 2022-08-02

## 2022-08-02 DIAGNOSIS — G56.21 CUBITAL TUNNEL SYNDROME, RIGHT: ICD-10-CM

## 2022-08-02 DIAGNOSIS — G56.01 RIGHT CARPAL TUNNEL SYNDROME: Primary | ICD-10-CM

## 2022-08-02 DIAGNOSIS — M65.331 TRIGGER MIDDLE FINGER OF RIGHT HAND: ICD-10-CM

## 2022-08-02 NOTE — TELEPHONE ENCOUNTER
He would like an order for PT to be sent to [facility]. They have OT there. He will be starting on the 15th but they need an order.

## 2022-08-09 ENCOUNTER — OFFICE VISIT (OUTPATIENT)
Dept: ORTHOPEDIC SURGERY | Age: 62
End: 2022-08-09

## 2022-08-09 DIAGNOSIS — G56.21 CUBITAL TUNNEL SYNDROME, RIGHT: ICD-10-CM

## 2022-08-09 DIAGNOSIS — G56.01 RIGHT CARPAL TUNNEL SYNDROME: Primary | ICD-10-CM

## 2022-08-09 DIAGNOSIS — M65.331 TRIGGER MIDDLE FINGER OF RIGHT HAND: ICD-10-CM

## 2022-08-09 PROCEDURE — 99024 POSTOP FOLLOW-UP VISIT: CPT | Performed by: NURSE PRACTITIONER

## 2022-08-09 NOTE — PROGRESS NOTES
Orthopaedic Hand Surgery Note    Name: Oumou Westfall  YOB: 1960  Gender: male  MRN: [id number]    HPI: Patient is status post right cubital tunnel release - Right, right carpal tunnel release and flexor synovectomy - Right, and right middle trigger finger release - Right on 7/28/2022. Patient reports he is doing well. He says he has not noted any change in his symptoms as of yet. He is scheduled for therapy on Monday at [address] [place] [address]. He denies locking or catching of the right middle finger. Physical Examination:  Wound healing well. Sutures are intact with no erythema or drainage. Good finger and wrist range of motion. Patient has expected postoperative swelling. .    Assessment:   1. Right carpal tunnel syndrome    2. Trigger middle finger of right hand    3. Cubital tunnel syndrome, right        Status post right cubital tunnel release - Right, right carpal tunnel release and flexor synovectomy - Right, and right middle trigger finger release - Right on 7/28/2022    Plan: We will remove his sutures. We discussed that the hand can get wet uncovered in the shower but no soaking the hand for 2 more weeks. After washing the hand the incision needs to be patted dry and covered. Lifting will be kept at 2 lbs and under with avoidance of undue stress on the hand for 2 weeks. Patient will see me in 4 weeks for reevaluation. He would like to schedule his left side when he sees us in 4 weeks.     Leena Moon NP  Orthopaedic Surgery  08/09/22  11:39 AM

## 2022-08-15 ENCOUNTER — HOSPITAL ENCOUNTER (OUTPATIENT)
Dept: PHYSICAL THERAPY | Age: 62
Setting detail: RECURRING SERIES
Discharge: HOME | End: 2022-08-18
Payer: COMMERCIAL

## 2022-08-15 PROCEDURE — 97165 OT EVAL LOW COMPLEX 30 MIN: CPT

## 2022-08-15 PROCEDURE — 97110 THERAPEUTIC EXERCISES: CPT

## 2022-08-15 PROCEDURE — 97140 MANUAL THERAPY 1/> REGIONS: CPT

## 2022-08-15 NOTE — THERAPY EVALUATION
Itz Feldman  : 1960  Primary: Umr   Secondary:  [address] [address]  Phone: [phone]  Fax: [phone] Plan Frequency: 2-3 times/week  Certification Period Expiration Date: 10/14/22    OT Visit Info: Total # of Visits to Date: 1  Progress Note Due Date: 22    OUTPATIENT OCCUPATIONAL THERAPY:OP NOTE TYPE: Initial Assessment 8/15/2022  Appt Desk   Episode      Treatment Diagnosis:  Pain in R hand (M79.641); Stiffness in R hand (M25.641); Pain in R elbow M25.521  Medical/Referring Diagnosis:  Carpal tunnel syndrome, right upper limb [G56.01]  Lesion of ulnar nerve, right upper limb [G56.21]  Referring Physician:  Kiley Mills MD MD Orders:  OT Eval and Treat; 2-3 x/week for 6 week  Return MD Appt:  2022  Date of Onset:  Onset Date: 22 (s/p surgery)   Allergies:  Clavulanic acid and Amoxicillin  Restrictions/Precautions:    Restrictions/Precautions: Other (comment) (2 lbs and under per  note)No data recorded  Medications Last Reviewed:  8/15/2022     SUBJECTIVE   History of Injury/Illness (Reason for Referral):  Itz Feldman states he has had carpal 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 He is now s/p right revision carpal tunnel release, extensive flexor synovectomy and biopsy, right cubital tunnel release, right middle trigger finger release on 2022 secondary to recurrent R carpal tunnel/cubital tunnel syndrome, and R middle trigger finger. He also has L carpal/cubital tunnel and wants to see how well he recovers from the R UE surgery. He states he gets numbness in the Rring/small fingers and is trying not to rest his elbow on arm rests, etc.    Patient Stated Goal(s):  \"Get this back to where I can use it again. \"  Initial:      (\"moderate\" per Quick-DASH)/10 Post Session: (no specific rating given)/10  Past Medical History/Comorbidities:   Mr. Malathi Hilario  has a past medical history of A-fib ([facility] BPH (benign prostatic hyperplasia), Cancer ([facility] Hx of blood clots, Hypertension, DANE (obstructive sleep apnea), Personal history of prostate cancer, Pulmonary emboli ([facility] and Sarcoidosis. Mr. Malathi Hilario  has a past surgical history that includes orthopedic surgery; AV node ablation; Carpal tunnel release (Left); Mandible surgery; Arm Surgery (Right, 7/28/2022); Carpal tunnel release (Right, 7/28/2022); and Finger trigger release (Right, 7/28/2022). 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 No data recordedNo data recorded   Learning:   Does the patient/guardian have any barriers to learning?: No barriers  What is the preferred language of the patient/guardian?: English  How does the patient/guardian prefer to learn new concepts?: Listening; Reading; Demonstration; Pictures/Videos   Fall Risk Scale  No data recorded   Dominant Side:  right handed  Previous Treatment Approaches:  Outpatient OT/PT for hands      OBJECTIVE   OBSERVATIONS:    Noted 1 cm healing longitudinal incision over distal van crease of R middle finger, 3-4 cm longitudinal incision volar wrist/hand and 5-6 cm healing incision over the R medial elbow.   SENSATION:  Sensation  Overall Sensation Status:  (Able to sense 3.61 monofilament on volar surface of hands.)    RANGE OF MOTION:  RUE AROM (degrees)  RUE AROM : Exceptions  R Elbow Flexion 0-145: 140  R Elbow Extension 145-0: full  R Wrist Flexion 0-80: 55  R Wrist Extension 0-70: 50  Right Hand AROM (degrees)  Right Hand AROM: Exceptions  R Thumb Opposition: 1 cm from small finger distal van crease  R Long  MCP 0-90: 65  R Long PIP 0-100: 55/10  R Long DIP 0-70: 60   HAND ASSESSMENT:  Left Hand Strength -  (lbs)  Handle Setting 2: 55  Right Tool Used: Disabilities of the Arm, Shoulder and Hand (DASH) Questionnaire - Quick Version  Score:  Initial: 36/55  Most Recent: X/55 (Date: -- )   Interpretation of Score: The DASH is designed to measure the activities of daily living in person's with upper extremity dysfunction or pain. Each section is scored on a 1-5 scale, 5 representing the greatest disability. The scores of each section are added together for a total score of 55. MEDICAL NECESSITY:   > Skilled intervention continues to be required due to R hand/wrist/elbow pain and stiffness affecting his overall independence with ADL, instrumental ADL and work tasks. .  REASON FOR SERVICES/OTHER COMMENTS:  > Patient continues to require skilled intervention due to R UE pain and stiffness affecting his overall independence with ADL, instrumental ADL and work tasks. .  Total Duration:       Regarding Mauro Warren's therapy, I certify that the treatment plan above will be carried out by a therapist or under their direction.   Thank you for this referral,  Johana Tellez OT     Referring Physician Signature: Kiley Mills MD                    Post Session Pain  Charge Capture   POC Link  Treatment Note Link  MD Boni Nuñez

## 2022-08-15 NOTE — PROGRESS NOTES
Robson Grace  : 1960  Primary: Umr   Secondary:  [address] [address]  Phone: [phone]  Fax: [phone] Plan Frequency: 2-3 times/week  Certification Period Expiration Date: 10/14/22    OT Visit Info: Total # of Visits to Date: 1  Progress Note Due Date: 22    OUTPATIENT OCCUPATIONAL THERAPY: Treatment Note 8/15/2022  Appt Desk   Episode      Treatment Diagnosis:  Pain in R hand (M79.641); Stiffness in R hand (M25.641); Pain in R elbow M25.521  Medical/Referring Diagnosis:  Carpal tunnel syndrome, right upper limb [G56.01]  Lesion of ulnar nerve, right upper limb [G56.21]  Medical/Referring Diagnosis:  Carpal tunnel syndrome, right upper limb [G56.01]  Lesion of ulnar nerve, right upper limb [G56.21]  Referring Physician:  Che Nevarez MD MD Orders:  OT Eval and Treat 2-3 x/week for 6 week  Date of Onset:  Onset Date: 22 (s/p surgery)   Allergies:  Clavulanic acid and Amoxicillin  Restrictions/Precautions:    Restrictions/Precautions: Other (comment) (2 lbs and under per  note)No data recorded  Medications Last Reviewed:  8/15/2022     Interventions Planned: (Treatment may consist of any combination of the following)  Current Treatment Recommendations: Strengthening; ROM; Equipment evaluation, education, & procurement; Modalities; Positioning; Return to work related activity; Manual Therapy:  STM   Subjective Comments:  Patient states he hasn't been able to golf since the s. Initial:      (\"moderate\" per Quick-DASH)/10 Post Session:      (no specific rating given)/10  Medications Last Reviewed:  8/15/2022  Updated Objective Findings:  See evaluation note from today  Treatment   THERAPEUTIC EXERCISE: (23 minutes):    Exercises per grid below to improve mobility, strength, and coordination.   Required minimal visual, verbal, manual, and tactile cues to promote proper body alignment, promote proper body posture, and promote proper body mechanics. Progressed resistance, range, repetitions, and complexity of movement as indicated. Date:  8/15 Date:    Date:      Activity/Exercise Parameters Parameters Parameters   Tendon glides 2-3 sets 5 reps each       IP joint flexion extension (middle) AROM with a relative motion block 1-2 sets 5-10 reps       AROM wrist finger extension flexion X 10-15 minutes fluidotherapy                    MANUAL THERAPY: (8 minutes): Soft tissue mobilization was utilized and necessary because of the patient's restricted joint motion, loss of articular motion, and restricted motion of soft tissue. Completed cross friction scar massage at the R middle distal van crease/volar wrist crease with patient relay/demonstration. Completed desensitization of R medial elbow incision. Access Code: [email or web address]  URL: [email or web address]  Date: 08/15/2022  Prepared by: Mars Franklin    Exercises  Seated Digit Tendon Gliding - 5 x daily - 7 x weekly - 1 sets - 10 reps      Treatment/Session Summary:    Treatment Assessment:     Continue OT per plan of care. Communication/Consultation:   Eval sent to MD.  Equipment provided today:  Sandie  Recommendations/Intent for next treatment session: Next visit will focus on advancement to more challenging activities.     Total Treatment Billable Duration: 23 minutes + untimed assessment  OT Individual Minutes  Time In: 1300  Time Out: 9904  Minutes: Jenniferstparishr [address], OT    Post Session Pain  Charge Capture   POC Link  Treatment Note Link  MD Guidelines  MyChart    Future Appointments   Date Time Provider Troy Brown   8/19/2022 11:00 AM Kirill Sierra OT [facility]   8/22/2022  8:20 AM 10 [facility] [address] [place] [facility]   8/22/2022  9:30 AM Kirill Sierra OT [facility]   8/25/2022  9:20 AM Zonia Felix DO [address] [place] AM[place]   9/6/2022  8:40 AM Argenis Villegas, APRN - CNP POAP [facility] AM[facility]   1/18/2023  8:00 AM [id number] BLOOD DRAW [id number] [place] AM[facility]   1/25/2023 8:15 AM Willa Howe MD [id number] [facility]   3/27/2023 11:00 AM BO Carrillo - ESTEVAN PSCD [facility] AM[facility]

## 2022-08-19 ENCOUNTER — HOSPITAL ENCOUNTER (OUTPATIENT)
Dept: PHYSICAL THERAPY | Age: 62
Setting detail: RECURRING SERIES
Discharge: HOME | End: 2022-08-22
Payer: COMMERCIAL

## 2022-08-19 PROCEDURE — 97140 MANUAL THERAPY 1/> REGIONS: CPT

## 2022-08-19 PROCEDURE — 97110 THERAPEUTIC EXERCISES: CPT

## 2022-08-19 NOTE — PROGRESS NOTES
Ana Laura Harding  : 1960  Primary: Umr   Secondary:  [address] [address]  Phone: [phone]  Fax: [phone] Plan Frequency: 2-3 times/week  Certification Period Expiration Date: 10/14/22      OT Visit Info: Total # of Visits to Date: 2  Progress Note Due Date: 22      OUTPATIENT OCCUPATIONAL THERAPY: Treatment Note 2022  Appt Desk   Episode      Treatment Diagnosis:  Pain in R hand (M79.641); Stiffness in R hand (M25.641); Pain in R elbow M25.521  Medical/Referring Diagnosis:  Carpal tunnel syndrome, right upper limb [G56.01]  Lesion of ulnar nerve, right upper limb [G56.21]  Medical/Referring Diagnosis:  Carpal tunnel syndrome, right upper limb [G56.01]  Lesion of ulnar nerve, right upper limb [G56.21]  Referring Physician:  Minerva Velásquez MD MD Orders:  OT Eval and Treat 2-3 x/week for 6 week  Date of Onset:  Onset Date: 22 (s/p surgery)     Allergies:  Clavulanic acid and Amoxicillin  Restrictions/Precautions:    Restrictions/Precautions: Other (comment) (2 lbs and under per  note)  No data recorded  Medications Last Reviewed:  2022     Interventions Planned: (Treatment may consist of any combination of the following)  Current Treatment Recommendations: Strengthening; ROM; Equipment evaluation, education, & procurement; Modalities; Positioning; Return to work related activity; Manual Therapy:  STM     Subjective Comments:  Patient states his ring/small fingers have been going to sleep - states he feels like the numbness is worse than it was on Monday. 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 States the middle finger is painful to move.   Initial:      (not at rest - elevated when moving)/10 Post Session:     4/10  Medications Last Reviewed:  2022  Updated Objective Findings: 8/19: Able to sense 3.61 monofilament volar surface of distal digits/bilateral hands; R dorsal interossei: 4+/5; R van interossei: Radial: 4+/5; ulnar: 4-/5; R lumbricals: ulnar: 4/5 complains of some pain in middle finger; Circumferential measurement of proximal phalanx: R: 7.9 cm; L: 7.5 cm. Treatment   MODALITIES: (0-10 minutes):        *  Cold Pack Therapy in order to provide analgesia. *  Hot Pack Therapy in order to provide analgesia and improve tissue extensibility in preparation for therapeutic exercises. THERAPEUTIC EXERCISE: (30 minutes):    Exercises per grid below to improve mobility, strength, and coordination. Required minimal visual, verbal, manual, and tactile cues to promote proper body alignment, promote proper body posture, and promote proper body mechanics. Progressed resistance, range, repetitions, and complexity of movement as indicated. Date:  8/15 Date:   8/19 Date:      Activity/Exercise Parameters Parameters Parameters   Tendon glides 2-3 sets 5 reps each  3-4 sets 5 -10 reps each     IP joint flexion extension (middle) AROM with a relative motion block 1-2 sets 5-10 reps       AROM wrist finger extension flexion X 10-15 minutes fluidotherapy  X 15 minutes fluidotherapy      Distal median nerve glides   2-3 sets held 15-20 seconds     Ulnar nerve glide  1-2 sets held 10-15 seconds (no complaints of numbness/tingling/pain)    Digit adduction  Between ring/small fingers yellow theraputty 1-2 sets 10-15 reps. Supine elbow flexion extension  1-2 sets 5-10 reps (no complaints of numbness/pain)       MANUAL THERAPY: (8 minutes): Soft tissue mobilization was utilized and necessary because of the patient's restricted joint motion, loss of articular motion, and restricted motion of soft tissue. Completed cross friction scar massage at the R elbow, middle distal van crease/volar wrist crease with patient relay/demonstration.  Completed desensitization of R medial elbow incision. Also, placed a stockinette over R elbow to demonstrate where to place a compression sleeve to help control swelling. Also discussed elevation with patient verbalizing understanding. Access Code: [email or web address]  URL: [email or web address]  Date: 08/15/2022  Prepared by: Eugene Garza    Exercises  Seated Digit Tendon Gliding - 5 x daily - 7 x weekly - 1 sets - 10 reps      Treatment/Session Summary:    Treatment Assessment:    Patient demonstrates close to full ROM at this time, but complains of middle finger pain with straight fist.  He also complains of numnbess in the ring/small fingers when resting his elbow while watching television at night. He states he diligently tries to keep his elbow straight and not rest his elbow on any armrests. He did not complain of any increased numbness during todays session. Plan to reach out to Dr. Yamilet Mccall regarding patient's complaints of numbness and middle finger pain. Continue OT per plan of care. Communication/Consultation:   E-mail sent to Dr. Keturah Montalvo provided today:  None  Recommendations/Intent for next treatment session: Next visit will focus on advancement to more challenging activities.     Total Treatment Billable Duration: 55  OT Individual Minutes  Time In: 1100  Time Out: 0084  Minutes: [address] [facility] [address] [facility], [address] Session Pain  Charge Capture   POC Link  Treatment Note Link  MD Guidelines  MyChart    Future Appointments   Date Time Provider Troy Brown   8/22/2022  8:20 AM [facility] [address] GVL AMB   8/22/2022  9:30 AM Siomara Hassan OT [facility] SFD   8/25/2022  9:20 AM Ricky Ramirez DO [address] GVL AMB   9/6/2022  8:40 AM BO Felton - CNP POAP GVL AMB   1/18/2023  8:00 AM [id number] BLOOD DRAW [id number] GVL AMB   1/25/2023  8:15 AM Lenette Schirmer, MD [id number] GVL AMB   3/27/2023 11:00 AM BO Lopez - ESTEVAN PSCD GVL AMB

## 2022-08-22 ENCOUNTER — HOSPITAL ENCOUNTER (OUTPATIENT)
Dept: PHYSICAL THERAPY | Age: 62
Setting detail: RECURRING SERIES
Discharge: HOME | End: 2022-08-25
Payer: COMMERCIAL

## 2022-08-22 DIAGNOSIS — E78.00 PURE HYPERCHOLESTEROLEMIA, UNSPECIFIED: Primary | ICD-10-CM

## 2022-08-22 DIAGNOSIS — E78.00 PURE HYPERCHOLESTEROLEMIA, UNSPECIFIED: ICD-10-CM

## 2022-08-22 LAB
CHOLEST SERPL-MCNC: 157 MG/DL
HDLC SERPL-MCNC: 46 MG/DL (ref 40–60)
HDLC SERPL: 3.4 {RATIO}
LDLC SERPL CALC-MCNC: 92.4 MG/DL
TRIGL SERPL-MCNC: 93 MG/DL (ref 35–150)
VLDLC SERPL CALC-MCNC: 18.6 MG/DL (ref 6–23)

## 2022-08-22 PROCEDURE — 97016 VASOPNEUMATIC DEVICE THERAPY: CPT

## 2022-08-22 PROCEDURE — 97110 THERAPEUTIC EXERCISES: CPT

## 2022-08-22 ASSESSMENT — PAIN SCALES - GENERAL: PAINLEVEL_OUTOF10: 7

## 2022-08-22 NOTE — PROGRESS NOTES
Patient with recent lipid panel drawn that was not in a fasting state. Erroneous values. Re-drawing now. Previously, patient instructed to to have labs drawn prior to next office visit. Patient has subsequently has July labs that dov largely suffice though will require new lipid.

## 2022-08-22 NOTE — PROGRESS NOTES
Marycarmen Hayes  : 1960  Primary: Umr   Secondary:  [address] [address]  Phone: [phone]  Fax: [phone] Plan Frequency: 2-3 times/week  Certification Period Expiration Date: 10/14/22      OT Visit Info: Total # of Visits to Date: 3  Progress Note Due Date: 22      OUTPATIENT OCCUPATIONAL THERAPY: Treatment Note 2022  Appt Desk   Episode      Treatment Diagnosis:  Pain in R hand (M79.641); Stiffness in R hand (M25.641); Pain in R elbow M25.521  Medical/Referring Diagnosis:  Carpal tunnel syndrome, right upper limb [G56.01]  Lesion of ulnar nerve, right upper limb [G56.21]  Medical/Referring Diagnosis:  Carpal tunnel syndrome, right upper limb [G56.01]  Lesion of ulnar nerve, right upper limb [G56.21]  Referring Physician:  Gato Rashid MD MD Orders:  OT Eval and Treat 2-3 x/week for 6 week  Date of Onset:  Onset Date: 22 (s/p surgery)     Allergies:  Clavulanic acid and Amoxicillin  Restrictions/Precautions:    Restrictions/Precautions: Other (comment) (2 lbs and under per  note)  No data recorded  Medications Last Reviewed:  2022     Interventions Planned: (Treatment may consist of any combination of the following)  Current Treatment Recommendations: Strengthening; ROM; Equipment evaluation, education, & procurement; Modalities; Positioning; Return to work related activity; Manual Therapy:  STM     Subjective Comments:  Patient states he had difficulty gripping a screwdriver this weekend even with a special handle. States it feels no different than Friday. He wore a compression sleeve for his R elbow in today.   Initial:     7/10 Post Session:      (\"same\")/10  Medications Last Reviewed:  2022  Updated Objective Findings:   : Able to sense 3.61 monofilament volar surface of distal digits/bilateral hands; R dorsal interossei: 4+/5; R van interossei: Radial: 4+/5; ulnar: 4-/5; R lumbricals: ulnar: 4/5 complains of some pain in middle finger; Circumferential measurement of proximal phalanx: R: 7.9 cm; L: 7.5 cm.   8/22: MCP: 70 PIP: 80; DIP: 65; circumferential measurement 1\" distal to antecubital space: 12 and 1/4\"; 1\" proximal to antecubital space: 12 and 1/4\"  Treatment   MODALITIES: (0-10 minutes):        *  Cold Pack Therapy in order to provide analgesia. Cold pack placed over R hand/wrist following therapeutic exercises. THERAPEUTIC EXERCISE: (30 minutes):    Exercises per grid below to improve mobility, strength, and coordination. Required minimal visual, verbal, manual, and tactile cues to promote proper body alignment, promote proper body posture, and promote proper body mechanics. Progressed resistance, range, repetitions, and complexity of movement as indicated. Date:  8/15 Date:   8/19 Date:  8/22    Activity/Exercise Parameters Parameters Parameters   Tendon glides 2-3 sets 5 reps each  3-4 sets 5 -10 reps each  3-4 sets 5 -10 reps each   IP joint flexion extension (middle) AROM with a relative motion block 1-2 sets 5-10 reps    AROM with a relative motion block 1-2 sets 5-10 reps   AROM wrist finger extension flexion X 10-15 minutes fluidotherapy  X 15 minutes fluidotherapy  X 15 minutes fluidotherapy    Distal median nerve glides   2-3 sets held 15-20 seconds     Ulnar nerve glide  1-2 sets held 10-15 seconds (no complaints of numbness/tingling/pain)    Digit adduction  Between ring/small fingers yellow theraputty 1-2 sets 10-15 reps. Supine elbow flexion extension  1-2 sets 5-10 reps (no complaints of numbness/pain) 1-2 sets 5-10 reps AROM. 1-2 sets 10-15 reps 1 lb wrist cuff attached in supine (1 rep with 1 lb dumbbell 1 rep, but with pain to )   Ceiling punches   1-2 sets 10-15 reps AROM      MANUAL THERAPY: (3 minutes):    Soft tissue mobilization was utilized and necessary because of the patient's restricted joint motion, loss of articular motion, and restricted motion of soft tissue. Completed cross friction scar massage at the R elbow, middle distal van crease/volar wrist crease with patient relay/demonstration. Completed desensitization of R medial elbow incision. Patient re-placed a R elbow compression sleeve to help control swelling. MODALITIES: (10 minutes):   Vasopneumatic Device/compression/ice treatment: (10 minutes minutes): Procedure(s) utilized to improve and/or restore functioning as related to decrease R elbow/UE swelling and pain applied to R elbow. Following treatment his circumferential measurement 1\" distal to antecubital space decresed 1/8\" to 12\" and 1/8\". Patient states it felt fine. Access Code: [email or web address]  URL: [email or web address]  Date: 08/15/2022  Prepared by: Jenni Allison    Exercises  Seated Digit Tendon Gliding - 5 x daily - 7 x weekly - 1 sets - 10 reps    Treatment/Session Summary:    Treatment Assessment:    Patient demonstrates improved R middle finger total AROM by 30 degrees. It is still painful to . His swelling around his R elbow decreased slightly following today's session. Continue with current home exercise program.Continue OT per plan of care. Communication/Consultation:   E-mail sent to Dr. Carrasco Males provided today:  None  Recommendations/Intent for next treatment session: Next visit will focus on advancement to more challenging activities.     Total Treatment Billable Duration: 45  OT Individual Minutes  Time In: 0930  Time Out: [address]  Minutes: [address], OT    Post Session Pain  Charge Capture   POC Link  Treatment Note Link  MD Guidelines  MyChart    Future Appointments   Date Time Provider Troy Brown   8/25/2022  9:20 AM Fidelina Real DO [address] GVL AMB   8/26/2022  8:45 AM Mario Skjonathan, OT [facility]   8/29/2022  9:30 AM Mario Skains, OT [facility]   9/6/2022  8:40 AM BO Esposito - CNP POAP GVL AMB   1/18/2023  8:00 AM [id number] BLOOD DRAW [id number] GVL AMB 1/25/2023  8:15 AM Zac Clark MD [id number] GVL AMB   3/27/2023 11:00 AM BO Klein - CNP PSCD GVL AMB

## 2022-08-23 NOTE — RESULT ENCOUNTER NOTE
His lipid panel shows stable LDL below 100, Total Cholesterol stable and still below 200. Tyriglycerides stable and at 93. He recently had some work done on his wrist because of recurrent carpal tunnel. Ask him how he's recovering and if he feels better.

## 2022-08-25 ENCOUNTER — OFFICE VISIT (OUTPATIENT)
Dept: INTERNAL MEDICINE CLINIC | Facility: CLINIC | Age: 62
End: 2022-08-25
Payer: COMMERCIAL

## 2022-08-25 VITALS
DIASTOLIC BLOOD PRESSURE: 69 MMHG | WEIGHT: 282.2 LBS | SYSTOLIC BLOOD PRESSURE: 112 MMHG | TEMPERATURE: 97.4 F | BODY MASS INDEX: 37.4 KG/M2 | HEIGHT: 73 IN | HEART RATE: 65 BPM | OXYGEN SATURATION: 96 %

## 2022-08-25 DIAGNOSIS — M79.642 BILATERAL HAND PAIN: ICD-10-CM

## 2022-08-25 DIAGNOSIS — M65.331 TRIGGER MIDDLE FINGER OF RIGHT HAND: ICD-10-CM

## 2022-08-25 DIAGNOSIS — M79.641 BILATERAL HAND PAIN: ICD-10-CM

## 2022-08-25 DIAGNOSIS — I48.11 LONGSTANDING PERSISTENT ATRIAL FIBRILLATION (HCC): ICD-10-CM

## 2022-08-25 DIAGNOSIS — K21.00 GASTROESOPHAGEAL REFLUX DISEASE WITH ESOPHAGITIS, UNSPECIFIED WHETHER HEMORRHAGE: ICD-10-CM

## 2022-08-25 DIAGNOSIS — G56.01 RIGHT CARPAL TUNNEL SYNDROME: ICD-10-CM

## 2022-08-25 DIAGNOSIS — G56.21 CUBITAL TUNNEL SYNDROME, RIGHT: ICD-10-CM

## 2022-08-25 DIAGNOSIS — I10 PRIMARY HYPERTENSION: ICD-10-CM

## 2022-08-25 DIAGNOSIS — D86.9 SARCOIDOSIS: ICD-10-CM

## 2022-08-25 DIAGNOSIS — E78.00 PURE HYPERCHOLESTEROLEMIA, UNSPECIFIED: Primary | ICD-10-CM

## 2022-08-25 PROCEDURE — 99214 OFFICE O/P EST MOD 30 MIN: CPT | Performed by: INTERNAL MEDICINE

## 2022-08-25 RX ORDER — LOSARTAN POTASSIUM AND HYDROCHLOROTHIAZIDE 12.5; 5 MG/1; MG/1
TABLET ORAL
Qty: 90 TABLET | Refills: 1 | Status: SHIPPED | OUTPATIENT
Start: 2022-08-25 | End: 2022-10-06

## 2022-08-25 RX ORDER — ATORVASTATIN CALCIUM 40 MG/1
40 TABLET, FILM COATED ORAL DAILY
Qty: 90 TABLET | Refills: 1 | Status: SHIPPED | OUTPATIENT
Start: 2022-08-25

## 2022-08-25 RX ORDER — ESOMEPRAZOLE MAGNESIUM 40 MG/1
40 CAPSULE, DELAYED RELEASE ORAL DAILY
Qty: 90 CAPSULE | Refills: 1 | Status: SHIPPED | OUTPATIENT
Start: 2022-08-25

## 2022-08-25 ASSESSMENT — PATIENT HEALTH QUESTIONNAIRE - PHQ9
1. LITTLE INTEREST OR PLEASURE IN DOING THINGS: 0
SUM OF ALL RESPONSES TO PHQ QUESTIONS 1-9: 0
SUM OF ALL RESPONSES TO PHQ9 QUESTIONS 1 & 2: 0
2. FEELING DOWN, DEPRESSED OR HOPELESS: 0

## 2022-08-25 ASSESSMENT — ENCOUNTER SYMPTOMS
SINUS PRESSURE: 0
ABDOMINAL PAIN: 0
SHORTNESS OF BREATH: 0
NAUSEA: 0
COUGH: 0
BACK PAIN: 0
CONSTIPATION: 0
SINUS PAIN: 0
DIARRHEA: 0
VOMITING: 0
EYE PAIN: 0

## 2022-08-25 NOTE — PROGRESS NOTES
Suzy Garcia (: 1960) presents today for:  Chief Complaint   Patient presents with    3 Month Follow-Up     Pt is here for routine check up and f/u on previous labs  and nerve study test.       Patient is a 26-year-old man with a medical history significant for sarcoidosis, atrial fibrillation, GERD with esophagitis, persistent atrial fibrillation status post ablation, long-term use of anticoagulation, status post pulmonary embolism that presents for follow-up visit. Patient recently with electromyogram and surgical repair for the second time for carpal tunnel syndrome. Patient blood pressure very well controlled in office today patient's weight 282 it currently BMI of 37.23 patient requesting refills of several medications including his Eliquis, Hyzaar, Lipitor and Nexium. Patient with carpal tunnel, right, right triger finger, and right ulnar entrapment release 30 days previously. Patient with a Right temporal regional MOH's for basal cell recently. 1 inch scar remaining. Patient will pursue left carpal tunnel release when he fully recovers. Currently with physical therapy and using compression sleeve. Assessment/Plan:  1. Pure hypercholesterolemia, unspecified    2. Primary hypertension    3. Longstanding persistent atrial fibrillation ([facility] [address]    4. Gastroesophageal reflux disease with esophagitis, unspecified whether hemorrhage    5. Sarcoidosis    6. Bilateral hand pain    7. Trigger middle finger of right hand    8. Cubital tunnel syndrome, right    9. Right carpal tunnel syndrome        No orders of the defined types were placed in this encounter.       Orders Placed This Encounter   Medications    apixaban (ELIQUIS) 5 MG TABS tablet     Sig: Take 1 tablet by mouth 2 times daily     Dispense:  180 tablet     Refill:  1    losartan-hydroCHLOROthiazide (HYZAAR) 50-12.5 MG per tablet     Sig: TAKE 1 TABLET BY MOUTH EVERY DAY     Dispense:  90 tablet     Refill:  1 atorvastatin (LIPITOR) 40 MG tablet     Sig: Take 1 tablet by mouth daily     Dispense:  90 tablet     Refill:  1    esomeprazole (NEXIUM) 40 MG delayed release capsule     Sig: Take 1 capsule by mouth daily     Dispense:  90 capsule     Refill:  1           Return in about 6 months (around 2/25/2023). Reviewed and updated this visit by provider:  Tobacco  Allergies  Meds  Problems  Med Hx  Surg Hx  Fam Hx         Review of Systems   Constitutional:  Negative for chills and fever. HENT:  Negative for hearing loss, postnasal drip, sinus pressure and sinus pain. Eyes:  Negative for pain and visual disturbance. Respiratory:  Negative for cough and shortness of breath. Cardiovascular:  Negative for chest pain and palpitations. Gastrointestinal:  Negative for abdominal pain, constipation, diarrhea, nausea and vomiting. Endocrine: Negative for polyuria. Genitourinary:  Negative for difficulty urinating, frequency and urgency. Musculoskeletal:  Negative for arthralgias, back pain and neck pain. Skin:  Negative for rash and wound. Neurological:  Negative for dizziness, weakness and headaches. Psychiatric/Behavioral:  Negative for behavioral problems and sleep disturbance. The patient is not nervous/anxious. Visit Vitals  /69   Pulse 65   Temp 97.4 °F (36.3 °C)   Ht 6' 1\" (1.854 m)   Wt 282 lb 3.2 oz (128 kg)   SpO2 96%   BMI 37.23 kg/m²       Physical Exam  Vitals reviewed. Constitutional:       General: He is not in acute distress. Appearance: Normal appearance. HENT:      Head: Normocephalic and atraumatic. Right Ear: External ear normal.      Left Ear: External ear normal.      Nose: Nose normal.      Mouth/Throat:      Mouth: Mucous membranes are moist.      Pharynx: Oropharynx is clear. Eyes:      Extraocular Movements: Extraocular movements intact.       Conjunctiva/sclera: Conjunctivae normal.   Cardiovascular:      Rate and Rhythm: Normal rate and regular rhythm. Pulses: Normal pulses. Heart sounds: Normal heart sounds. Pulmonary:      Effort: Pulmonary effort is normal.      Breath sounds: Normal breath sounds. No wheezing. Abdominal:      General: Bowel sounds are normal.      Tenderness: There is no abdominal tenderness. Musculoskeletal:         General: No swelling or deformity. Normal range of motion. Cervical back: Normal range of motion. Skin:     General: Skin is warm and dry. Coloration: Skin is not jaundiced. Neurological:      General: No focal deficit present. Mental Status: He is alert and oriented to person, place, and time. Mental status is at baseline. Psychiatric:         Mood and Affect: Mood normal.         Behavior: Behavior normal.         Thought Content:  Thought content normal.           Dana Bradley, DO

## 2022-08-25 NOTE — ACP (ADVANCE CARE PLANNING)
Advance Care Planning   Advance Care Planning (ACP)     Attempted to discuss ACP with Mr. Malathi Hilario today, he declines to discuss at this time. Will readdress at future visit.

## 2022-08-26 ENCOUNTER — HOSPITAL ENCOUNTER (OUTPATIENT)
Dept: PHYSICAL THERAPY | Age: 62
Setting detail: RECURRING SERIES
Discharge: HOME | End: 2022-08-29
Payer: COMMERCIAL

## 2022-08-26 PROCEDURE — 97110 THERAPEUTIC EXERCISES: CPT

## 2022-08-26 PROCEDURE — 97140 MANUAL THERAPY 1/> REGIONS: CPT

## 2022-08-26 NOTE — PROGRESS NOTES
Gladis Goode  : 1960  Primary: Umr   Secondary:  [address] [address]  Phone: [phone]  Fax: [phone] Plan Frequency: 2-3 times/week  Certification Period Expiration Date: 10/14/22      OT Visit Info: Total # of Visits to Date: 4  Progress Note Due Date: 22      OUTPATIENT OCCUPATIONAL THERAPY: Treatment Note 2022  Appt Desk   Episode      Treatment Diagnosis:  Pain in R hand (M79.641); Stiffness in R hand (M25.641); Pain in R elbow M25.521  Medical/Referring Diagnosis:  Carpal tunnel syndrome, right upper limb [G56.01]  Lesion of ulnar nerve, right upper limb [G56.21]  Medical/Referring Diagnosis:  Carpal tunnel syndrome, right upper limb [G56.01]  Lesion of ulnar nerve, right upper limb [G56.21]  Referring Physician:  Layla Harper MD MD Orders:  OT Eval and Treat 2-3 x/week for 6 week  Date of Onset:  Onset Date: 22 (s/p surgery)     Allergies:  Clavulanic acid and Amoxicillin  Restrictions/Precautions:    Restrictions/Precautions: Other (comment) (2 lbs and under per  note)  No data recorded  Medications Last Reviewed:  2022     Interventions Planned: (Treatment may consist of any combination of the following)  Current Treatment Recommendations: Strengthening; ROM; Equipment evaluation, education, & procurement; Modalities; Positioning; Return to work related activity; Manual Therapy:  STM     Subjective Comments:  Patient states his hand is still stiff/painful in the middle finger - might e-mail Dr. Casey Cedillo. He states the numbness has decreased.   Initial:      (minimal at rest; up to 6 with movement)/10 Post Session:      (no complaints)/10  Medications Last Reviewed:  2022  Updated Objective Findings:   : Able to sense 3.61 monofilament volar surface of distal digits/bilateral hands; R dorsal interossei: 4+/5; R van interossei: Radial: 4+/5; ulnar: 4-/5; R lumbricals: ulnar: 4/5 complains of some pain in middle finger; Circumferential measurement of proximal phalanx: R: 7.9 cm; L: 7.5 cm.   8/22: MCP: 70 PIP: 80; DIP: 65; circumferential measurement 1\" distal to antecubital space: 12 and 1/4\"; 1\" proximal to antecubital space: 12 and 1/4\"  Treatment   MODALITIES: (0minutes):        *  Cold Pack Therapy in order to provide analgesia. Cold pack placed over R hand/wrist following therapeutic exercises. THERAPEUTIC EXERCISE: (23 minutes):    Exercises per grid below to improve mobility, strength, and coordination. Required minimal visual, verbal, manual, and tactile cues to promote proper body alignment, promote proper body posture, and promote proper body mechanics. Progressed resistance, range, repetitions, and complexity of movement as indicated. Date:  8/15 Date:   8/19 Date:  8/22  Date:  8/26   Activity/Exercise Parameters Parameters Parameters Parameters   Tendon glides 2-3 sets 5 reps each  3-4 sets 5 -10 reps each  3-4 sets 5 -10 reps each  3-4 sets 5 -10 reps each   IP joint flexion extension (middle) AROM with a relative motion block 1-2 sets 5-10 reps    AROM with a relative motion block 1-2 sets 5-10 reps AROM with a relative motion block 1-2 sets 5-10 reps   AROM wrist finger extension flexion X 10-15 minutes fluidotherapy  X 15 minutes fluidotherapy  X 15 minutes fluidotherapy X 15 minutes fluidotherapy    Distal median nerve glides   2-3 sets held 15-20 seconds      Ulnar nerve glide  1-2 sets held 10-15 seconds (no complaints of numbness/tingling/pain)     Digit adduction  Between ring/small fingers yellow theraputty 1-2 sets 10-15 reps. Supine elbow flexion extension  1-2 sets 5-10 reps (no complaints of numbness/pain) 1-2 sets 5-10 reps AROM.   1-2 sets 10-15 reps 1 lb wrist cuff attached in supine (1 rep with 1 lb dumbbell 1 rep, but with pain to )    Ceiling punches   1-2 sets 10-15 reps AROM    U BE    3 minutes forward/3 minutes backward against 3.0 resistance      MANUAL THERAPY: (15 minutes): Soft tissue mobilization was utilized and necessary because of the patient's restricted joint motion, loss of articular motion, and restricted motion of soft tissue. Completed cross friction scar massage at the R elbow, middle distal van crease/volar wrist crease with patient relay/demonstration. Completed desensitization of R medial elbow incision and also completed tissue pull with kinesiotape in all directions. Also, a ~16\" strip of kinesiotape was cut with 3-4\" distal bifurcation with rounded edges and applied to medial epicondyle proximally and along wrist/finger flexor  muscle bulk with 50% tension then across wrist to volar surface of ring/middle fingers just distal to PIP joint to assist with finger flexion and reduce edema. Estil Darby 4 small 2\" x 1/2\" strips of kinesiotape were cut and placed circumferentially around the  Proximal and middle phalanx of index/middle fingers to hold longitudinal strip of kinesiotape. Patient states it felt good. Patient re-placed a R elbow compression sleeve to help control swelling. MODALITIES: (0 minutes):   Vasopneumatic Device/compression/ice treatment: (10 minutes minutes): Procedure(s) utilized to improve and/or restore functioning as related to decrease R elbow/UE swelling and pain applied to R elbow. Following treatment his circumferential measurement 1\" distal to antecubital space decresed 1/8\" to 12\" and 1/8\". Patient states it felt fine. Access Code: [email or web address]  URL: [email or web address]  Date: 08/15/2022  Prepared by: Mike Sen    Exercises  Seated Digit Tendon Gliding - 5 x daily - 7 x weekly - 1 sets - 10 reps    Treatment/Session Summary:    Treatment Assessment:    Patient demonstrates good ROM.   His AROM is still a challenge to come into full composite flexion - added kinesiotape assist this date and patient verbalizes understanding of wear instructions - monitor for any signs of erythema/circulation issues and remove. Continue OT per plan of care. Communication/Consultation:   E-mail sent to Dr. Fabienne Vasques provided today:  None  Recommendations/Intent for next treatment session: Next visit will focus on advancement to more challenging activities.     Total Treatment Billable Duration: 45  OT Individual Minutes  Time In: 0485  Time Out: 0930  Minutes: [address], [address] Session Pain  Charge Capture   POC Link  Treatment Note Link  MD Guidelines  MyChart    Future Appointments   Date Time Provider Troy Brown   8/29/2022  9:30 AM Dominique Verdin 23 [facility], OT [facility]   9/6/2022  8:40 AM Tomeka Metzger APRN - CNP POAP GVL AMB   1/18/2023  8:00 AM [id number] BLOOD DRAW [id number] GVL AMB   1/25/2023  8:15 AM Coby Richardson MD [id number] GVL AMB   2/27/2023  9:00 AM Kush Villa DO [address] GVL AMB   3/27/2023 11:00 AM Maria Antonia Luna APRN - CNP PSCD GVL AMB

## 2022-08-29 ENCOUNTER — HOSPITAL ENCOUNTER (OUTPATIENT)
Dept: PHYSICAL THERAPY | Age: 62
Setting detail: RECURRING SERIES
Discharge: HOME | End: 2022-09-01
Payer: COMMERCIAL

## 2022-08-29 PROCEDURE — 97110 THERAPEUTIC EXERCISES: CPT

## 2022-08-29 NOTE — PROGRESS NOTES
Eden Kelley  : 1960  Primary: Umr   Secondary:  [address] @ [address] [address]  Phone: [phone]  Fax: [phone] Plan Frequency: 2-3 times/week  Certification Period Expiration Date: 10/14/22      OT Visit Info: Total # of Visits to Date: 5  Progress Note Due Date: 22      OUTPATIENT OCCUPATIONAL THERAPY: Treatment Note 2022  Appt Desk   Episode      Treatment Diagnosis:  Pain in R hand (M79.641); Stiffness in R hand (M25.641); Pain in R elbow M25.521  Lesion of ulnar nerve, right upper limb [G56.21]  Medical/Referring Diagnosis:  Carpal tunnel syndrome, right upper limb [G56.01]  Lesion of ulnar nerve, right upper limb [G56.21]  Referring Physician:  Darleen Hamilton MD MD Orders:  OT Eval and Treat 2-3 x/week for 6 week  Date of Onset:  Onset Date: 22 (s/p surgery)     Allergies:  Clavulanic acid and Amoxicillin  Restrictions/Precautions:    Restrictions/Precautions: Other (comment) (2 lbs and under per  note)  No data recorded  Medications Last Reviewed:  2022     Interventions Planned: (Treatment may consist of any combination of the following)  Current Treatment Recommendations: Strengthening; ROM; Equipment evaluation, education, & procurement; Modalities; Positioning; Return to work related activity; Manual Therapy:  STM     Subjective Comments:  Mr. Odin Simental states the numbness in his hand has decreased, but his middle finger is still stiff and hard to bend.   Initial:      (\"moderate\" per Quick-DASH)/10 Post Session:      (no complaints of pain)/10  Medications Last Reviewed:  2022  Updated Objective Findings:   : Able to sense 3.61 monofilament volar surface of distal digits/bilateral hands; R dorsal interossei: 4+/5; R van interossei: Radial: 4+/5; ulnar: 4-/5; R lumbricals: ulnar: 4/5 complains of some pain in middle finger; Circumferential measurement of proximal phalanx: 1-2 sets 5-10 reps. Digit extension    1-2 sets 5-10 reps against rubber band resistance   Elbow flexion extension Supine e 1-2 sets 5-10 reps (no complaints of numbness/pain) Supine  1. 1-2 sets 5-10 reps AROM. 1-2 sets 10-15 reps 1 lb wrist cuff attached in supine (1 rep with 1 lb dumbbell 1 rep, but with pain to )  In standing 2-3 sets 10-15 reps 1 lb dumbbell forearm pronated and in neutral.   Ceiling punches  1-2 sets 10-15 reps AROM     U BE   3 minutes forward/3 minutes backward against 3.0 resistance       MANUAL THERAPY: (7 minutes): Soft tissue mobilization was utilized and necessary because of the patient's restricted joint motion, loss of articular motion, and restricted motion of soft tissue. Completed cross friction scar massage at the R elbow, middle distal van crease/volar wrist crease with patient relay/demonstration. Access Code: [email or web address]  URL: [email or web address]  Date: 08/15/2022  Prepared by: Kathryn Villalpando    Exercises  Seated Digit Tendon Gliding - 5 x daily - 7 x weekly - 1 sets - 10 reps  Access Code: [email or web address]  URL: [email or web address]  Date: 08/29/2022  Prepared by: Kathryn Villalpando    Exercises  Standing Ulnar Nerve Glide - 1 x daily - 7 x weekly - 3 sets - 10 reps  Seated Finger Composite Flexion with Putty - 1 x daily - 7 x weekly - 3 sets - 10 reps  Standing Pronated Elbow Flexion with Dumbbell - 1 x daily - 7 x weekly - 3 sets - 10 reps      Treatment/Session Summary:    Treatment Assessment:    Mr. Hernando Narvaez demonstrates about a 10 lb improvement in  strength and 30 degrees improvement in total AROM of R middle finger compared to 2 weeks ago. His middle finger limitations appear to be due to residual swelling. He reports less numbess and about an 8% decrease in disability per Quick-DASH. Continue OT per plan of care.   Communication/Consultation:  None today  Equipment provided today:  Yellow theraputty  Recommendations/Intent for next treatment session: Next visit will focus on advancement to more challenging activities.     Total Treatment Billable Duration: 45  OT Individual Minutes  Time In: 0930  Time Out: [address]  Minutes: [address], [address] Session Pain  Charge Capture   POC Link  Treatment Note Link  MD Guidelines  MyChart    Future Appointments   Date Time Provider Troy Stephanie   9/6/2022  8:40 AM BO Lai CNP POAP [facility] AMB   9/7/2022  8:00 AM Elida Gramajo OT [facility]   1/18/2023  8:00 AM [id number] BLOOD DRAW [id number] [facility] AMB   1/25/2023  8:15 AM Joyce Allan MD [id number] [facility] AMB   2/27/2023  9:00 AM Artur Boothe DO [address] [facility] AMB   3/27/2023 11:00 AM BO Garcia CNP PSCD [facility] AMB

## 2022-09-06 ENCOUNTER — OFFICE VISIT (OUTPATIENT)
Dept: ORTHOPEDIC SURGERY | Age: 62
End: 2022-09-06

## 2022-09-06 DIAGNOSIS — G56.01 RIGHT CARPAL TUNNEL SYNDROME: ICD-10-CM

## 2022-09-06 DIAGNOSIS — M65.331 TRIGGER MIDDLE FINGER OF RIGHT HAND: ICD-10-CM

## 2022-09-06 DIAGNOSIS — G56.21 CUBITAL TUNNEL SYNDROME, RIGHT: Primary | ICD-10-CM

## 2022-09-06 PROCEDURE — 99024 POSTOP FOLLOW-UP VISIT: CPT | Performed by: NURSE PRACTITIONER

## 2022-09-06 RX ORDER — MELOXICAM 15 MG/1
15 TABLET ORAL DAILY
Qty: 42 TABLET | Refills: 0 | Status: SHIPPED | OUTPATIENT
Start: 2022-09-06 | End: 2022-10-18

## 2022-09-06 RX ORDER — METHYLPREDNISOLONE 4 MG/1
TABLET ORAL
Qty: 1 KIT | Refills: 0 | Status: SHIPPED | OUTPATIENT
Start: 2022-09-06 | End: 2022-10-06 | Stop reason: ALTCHOICE

## 2022-09-06 NOTE — PROGRESS NOTES
Orthopaedic Hand Clinic Note    Name: Angelica Avendaño  YOB: 1960  Gender: male  MRN: [id number]      Follow up visit:   1. Cubital tunnel syndrome, right    2. Right carpal tunnel syndrome    3. Trigger middle finger of right hand        HPI: Angelica Avendaño is a 57 y.o. male who is following up for right middle trigger finger release, right revision carpal tunnel release, right cubital tunnel release. He 6 weeks out from surgery. He went twice to therapy. He is doing home exercises. He says his elbow is not that sensitive but still has swelling. He is wearing a compressive sleeve. He has some tenderness in the forearm. He says his middle finger is painful. He reports he is not able to make a composite fist.  He also notes pins-and-needles in the ring and small finger. Rupert Polanco he is having more pain than prior to surgery. ROS/Meds/PSH/PMH/FH/SH: I personally reviewed the patients standard intake form. Pertinents are discussed in the HPI    Physical Examination:    Musculoskeletal Examination:  Examination on the right upper extremity demonstrates cap refill < 5 seconds in all fingers,   Patient has well-healed incisions to the right elbow and hand. He has full range of motion to the elbow. He is still slightly tender around his healed incision to the right middle finger. He is unable to make a composite fist actively. He can passively. He has good capillary refill. Dr. Barbara Webb has examined the patient. Imaging / Electrodiagnostic Tests:     None    Assessment:     ICD-10-CM    1. Cubital tunnel syndrome, right  G56.21       2. Right carpal tunnel syndrome  G56.01       3. Trigger middle finger of right hand  M65.331           Plan:   We discussed the diagnosis and different treatment options. We discussed observation, therapy, antiinflammatory medications and other pertinent treatment modalities.     After discussing in detail the patient elects to proceed with Medrol Dosepak and anti-inflammatories. He will continue with his home exercises. We will reassess his progress back in 2 months. He would like to discuss doing the left side before the end of the year as he has met his deductible. We will reevaluate that at his next appointment. Patient voiced accordance and understanding of the information provided and the formulated plan. All questions were answered to the patient's satisfaction during the encounter.     Treatment at this time: Prescription medication and home exercises    BO Kruger - CNP  Orthopaedic Surgery  09/06/22  9:05 AM

## 2022-10-06 ENCOUNTER — OFFICE VISIT (OUTPATIENT)
Dept: INTERNAL MEDICINE CLINIC | Facility: CLINIC | Age: 62
End: 2022-10-06
Payer: COMMERCIAL

## 2022-10-06 VITALS
OXYGEN SATURATION: 98 % | HEART RATE: 67 BPM | WEIGHT: 284 LBS | DIASTOLIC BLOOD PRESSURE: 75 MMHG | SYSTOLIC BLOOD PRESSURE: 109 MMHG | BODY MASS INDEX: 37.64 KG/M2 | TEMPERATURE: 98.2 F | HEIGHT: 73 IN

## 2022-10-06 DIAGNOSIS — R31.0 GROSS HEMATURIA: Primary | ICD-10-CM

## 2022-10-06 DIAGNOSIS — I10 PRIMARY HYPERTENSION: ICD-10-CM

## 2022-10-06 LAB
BILIRUBIN, URINE, POC: NEGATIVE
BLOOD URINE, POC: NORMAL
CREAT UR-MCNC: 35 MG/DL
GLUCOSE URINE, POC: NEGATIVE
KETONES, URINE, POC: NEGATIVE
LEUKOCYTE ESTERASE, URINE, POC: NEGATIVE
MICROALBUMIN UR-MCNC: 0.71 MG/DL
MICROALBUMIN/CREAT UR-RTO: 20 MG/G
NITRITE, URINE, POC: NEGATIVE
PH, URINE, POC: 5.5 (ref 4.6–8)
PROTEIN,URINE, POC: NEGATIVE
SPECIFIC GRAVITY, URINE, POC: 1.01 (ref 1–1.03)
URINALYSIS CLARITY, POC: CLEAR
URINALYSIS COLOR, POC: YELLOW
UROBILINOGEN, POC: 0.2

## 2022-10-06 PROCEDURE — 99214 OFFICE O/P EST MOD 30 MIN: CPT | Performed by: INTERNAL MEDICINE

## 2022-10-06 PROCEDURE — 81003 URINALYSIS AUTO W/O SCOPE: CPT | Performed by: INTERNAL MEDICINE

## 2022-10-06 RX ORDER — HYDROCHLOROTHIAZIDE 12.5 MG/1
12.5 CAPSULE, GELATIN COATED ORAL EVERY MORNING
Qty: 90 CAPSULE | Refills: 1 | Status: SHIPPED | OUTPATIENT
Start: 2022-10-06

## 2022-10-06 RX ORDER — LOSARTAN POTASSIUM 25 MG/1
25 TABLET ORAL DAILY
Qty: 90 TABLET | Refills: 1 | Status: SHIPPED | OUTPATIENT
Start: 2022-10-06

## 2022-10-06 ASSESSMENT — PATIENT HEALTH QUESTIONNAIRE - PHQ9
SUM OF ALL RESPONSES TO PHQ QUESTIONS 1-9: 0
SUM OF ALL RESPONSES TO PHQ QUESTIONS 1-9: 0
2. FEELING DOWN, DEPRESSED OR HOPELESS: 0
SUM OF ALL RESPONSES TO PHQ QUESTIONS 1-9: 0
SUM OF ALL RESPONSES TO PHQ QUESTIONS 1-9: 0
SUM OF ALL RESPONSES TO PHQ9 QUESTIONS 1 & 2: 0
1. LITTLE INTEREST OR PLEASURE IN DOING THINGS: 0

## 2022-10-06 ASSESSMENT — ENCOUNTER SYMPTOMS
VOMITING: 0
EYE PAIN: 0
COUGH: 0
SINUS PRESSURE: 0
DIARRHEA: 0
NAUSEA: 0
SHORTNESS OF BREATH: 0
BACK PAIN: 0
CONSTIPATION: 0
ABDOMINAL PAIN: 0
SINUS PAIN: 0

## 2022-10-06 NOTE — PROGRESS NOTES
Clive Caal (: 1960) presents today for:  Chief Complaint   Patient presents with    Hematuria     Pt states sx's have been present over the last 3 weeks and is worsening. Around the 3rd time of the day urine is completely red and then later it looks like it's clotting. Dysuria     Sx's have been present x 1 month. Patient is a 75-year-old man with medical history significant for atrial fibrillation, long-term anticoagulation, pulmonary embolism, prostate cancer, GERD, sarcoidosis, recurrent bilateral carpal tunnel syndrome, and recurrent trigger finger that presents for problem specific complaint of hematuria that worsens throughout the day. This problem has been worsening over time. Patient has been maintained on 5 mg twice daily of Eliquis for recent pulmonary embolism and longstanding atrial fibrillation status post ablation. New onsets hematuria with sudden onset. The onset has been on weekends. On first occasion, bright red blood present. He has noticed small clots as well. He has noticed some dysuria over the past two months. Patients hematuria is intermittent in nature but very concerning because of the quantity of blood in the toilet. He established with Urology locally in January and is agreeable to return visit for evaluation. POC dipstick without heme or leukocytes in office today. Patients right carpal tunnel repair healing well, but with continued pain at the rist and elbow. Patient will return for office visit in the near future and is likely to decline surgery for other wrist. He continues to have numbness as well. Patient is feeling a little weak and states that dizziness occurs on occasion particularly when standing. Blood pressures at home have been 106 consistently systolic. He is maintained on losartan 50-hydrochlorothiazide 12.5. We will break this prescribing pattern up into 2 distinct prescription.   We will provide 25 mg losartan in large quantities at this time advising patient to utilize 2025 mg tablet at this time. Patient will continue with 12.5 hydrochlorothiazide daily. We will check a blood count at this time. Hematuria  Irritative symptoms do not include frequency or urgency. Associated symptoms include dysuria. Pertinent negatives include no abdominal pain, chills, fever, nausea or vomiting. Dysuria   Associated symptoms include hematuria. Pertinent negatives include no chills, frequency, nausea, urgency or vomiting. Assessment/Plan:  1. Gross hematuria    2. Primary hypertension        Orders Placed This Encounter   Procedures    Microalbumin / Creatinine Urine Ratio     Standing Status:   Future     Standing Expiration Date:   10/6/2023    CBC with Auto Differential     Standing Status:   Future     Standing Expiration Date:   10/6/2023    [facility] - Uzma Perez MD, Urology, [place]     Referral Priority:   Routine     Referral Type:   Eval and Treat     Referral Reason:   Specialty Services Required     Referred to Provider:   Victor Hugo Peters MD     Requested Specialty:   Urology     Number of Visits Requested:   1    AMB POC URINALYSIS DIP STICK AUTO W/O MICRO         Orders Placed This Encounter   Medications    hydroCHLOROthiazide (MICROZIDE) 12.5 MG capsule     Sig: Take 1 capsule by mouth every morning     Dispense:  90 capsule     Refill:  1    losartan (COZAAR) 25 MG tablet     Sig: Take 1 tablet by mouth daily     Dispense:  90 tablet     Refill:  1           No follow-ups on file. Reviewed and updated this visit by provider:  Tobacco  Allergies  Meds  Problems  Med Hx  Surg Hx  Fam Hx         Review of Systems   Constitutional:  Negative for chills and fever. HENT:  Negative for hearing loss, postnasal drip, sinus pressure and sinus pain. Eyes:  Negative for pain and visual disturbance. Respiratory:  Negative for cough and shortness of breath.     Cardiovascular:  Negative for chest pain and palpitations. Gastrointestinal:  Negative for abdominal pain, constipation, diarrhea, nausea and vomiting. Endocrine: Negative for polyuria. Genitourinary:  Positive for dysuria and hematuria. Negative for difficulty urinating, frequency and urgency. Musculoskeletal:  Negative for arthralgias, back pain and neck pain. Skin:  Negative for rash and wound. Neurological:  Negative for dizziness, weakness and headaches. Psychiatric/Behavioral:  Negative for behavioral problems and sleep disturbance. The patient is not nervous/anxious. Visit Vitals  /75   Pulse 67   Temp 98.2 °F (36.8 °C)   Ht 6' 1\" (1.854 m)   Wt 284 lb (128.8 kg)   SpO2 98%   BMI 37.47 kg/m²       Physical Exam  Vitals reviewed. Constitutional:       General: He is not in acute distress. Appearance: Normal appearance. HENT:      Head: Normocephalic and atraumatic. Right Ear: External ear normal.      Left Ear: External ear normal.      Nose: Nose normal.      Mouth/Throat:      Mouth: Mucous membranes are moist.      Pharynx: Oropharynx is clear. Eyes:      Extraocular Movements: Extraocular movements intact. Conjunctiva/sclera: Conjunctivae normal.   Cardiovascular:      Rate and Rhythm: Normal rate and regular rhythm. Pulses: Normal pulses. Heart sounds: Normal heart sounds. Pulmonary:      Effort: Pulmonary effort is normal.      Breath sounds: Normal breath sounds. No wheezing. Abdominal:      General: Bowel sounds are normal.      Tenderness: There is no abdominal tenderness. Musculoskeletal:         General: No swelling or deformity. Normal range of motion. Cervical back: Normal range of motion. Skin:     General: Skin is warm and dry. Coloration: Skin is not jaundiced. Neurological:      General: No focal deficit present. Mental Status: He is alert and oriented to person, place, and time. Mental status is at baseline.    Psychiatric:         Mood and Affect: Mood normal.         Behavior: Behavior normal.         Thought Content:  Thought content normal.           Allison Seaman, DO

## 2022-10-06 NOTE — ACP (ADVANCE CARE PLANNING)
Advance Care Planning   Advance Care Planning (ACP)     Attempted to discuss ACP with Mr. Balta Choi today, he declines to discuss at this time. Will readdress at future visit.

## 2022-10-16 ASSESSMENT — ENCOUNTER SYMPTOMS
VOMITING: 0
BACK PAIN: 0
INDIGESTION: 0
HEARTBURN: 0
BLOOD IN STOOL: 0
DIARRHEA: 0
ABDOMINAL PAIN: 0
EYE PAIN: 0
CONSTIPATION: 0
EYE DISCHARGE: 0
NAUSEA: 0
WHEEZING: 0
COUGH: 0
SHORTNESS OF BREATH: 0
SKIN LESIONS: 0

## 2022-10-17 ENCOUNTER — OFFICE VISIT (OUTPATIENT)
Dept: UROLOGY | Age: 62
End: 2022-10-17
Payer: COMMERCIAL

## 2022-10-17 DIAGNOSIS — C61 MALIGNANT NEOPLASM OF PROSTATE (HCC): Primary | ICD-10-CM

## 2022-10-17 DIAGNOSIS — N52.01 ERECTILE DYSFUNCTION DUE TO ARTERIAL INSUFFICIENCY: ICD-10-CM

## 2022-10-17 DIAGNOSIS — N39.3 STRESS INCONTINENCE (FEMALE) (MALE): ICD-10-CM

## 2022-10-17 DIAGNOSIS — R31.0 GROSS HEMATURIA: ICD-10-CM

## 2022-10-17 PROCEDURE — 99214 OFFICE O/P EST MOD 30 MIN: CPT | Performed by: UROLOGY

## 2022-10-17 NOTE — PROGRESS NOTES
[facility] Urology  [address]   [address] [facility], [address] [facility]  [phone]    Raiza Lazcano  : 1960    CC: Jhon Walker Hematuria     HPI     Raiza Lazcano is a 57 y.o.  male with a PMH of shanelle 7 CAP s/p RALP + adjuvant XRT for pT3 disease in 2014 in [place]. Seen 2022 by me. PSA has been < 0.1 since treatment. His main complaint today is gross hematuria. Today, he reports 1 month of gross hematuria of unknown origin. States that it comes/goes ever day. He has not had hematuria work up. Hematuria is painless. FIORDALIZA requires that the wear a bad. FIORDALIZA worse with fatigue and while sleeping. Not performing kegel exercises and has learned to live with FIORDALIZA treatment. ED has been since surgery. He currently has success with NANCY but states that it is difficult at times. He has tried Cialis and Viagra without success. He also tried trimix, but had pain with injections. He switched to bimix but states that it did not work. He wants to discuss options today. On review, he does report fatigue/low libido suggestive of low T. He has not tried TRT due to history of CAP and does not want it at this time.        PSA < 0.1 (2022) with PCP    Past Medical History:   Diagnosis Date    A-fib [facility])     managed with med    BPH (benign prostatic hyperplasia)     Cancer (HCC)     basal  cell    Hx of blood clots     Hypertension     DANE (obstructive sleep apnea)     Personal history of prostate cancer 2012    Pulmonary emboli ([address] 2022    Bilateral    Sarcoidosis      Past Surgical History:   Procedure Laterality Date    ARM SURGERY Right 2022    right cubital tunnel release performed by Willa Fernandez MD at [address]      2015 x 2 and 2016 (a total of 3)    CARPAL TUNNEL RELEASE Left     CARPAL TUNNEL RELEASE Right 2022    right carpal tunnel release and flexor synovectomy performed by Willa Fernandez MD at [address] [facility] FINGER TRIGGER RELEASE Right 7/28/2022    right middle trigger finger release performed by Ivelisse Garcia MD at [facility] [address]      3 knee surgeries right knee 1 surgery left knee     Current Outpatient Medications   Medication Sig Dispense Refill    hydroCHLOROthiazide (MICROZIDE) 12.5 MG capsule Take 1 capsule by mouth every morning 90 capsule 1    losartan (COZAAR) 25 MG tablet Take 1 tablet by mouth daily 90 tablet 1    meloxicam (MOBIC) 15 MG tablet Take 1 tablet by mouth daily 42 tablet 0    apixaban (ELIQUIS) 5 MG TABS tablet Take 1 tablet by mouth 2 times daily 180 tablet 1    atorvastatin (LIPITOR) 40 MG tablet Take 1 tablet by mouth daily 90 tablet 1    esomeprazole (NEXIUM) 40 MG delayed release capsule Take 1 capsule by mouth daily 90 capsule 1    cyclobenzaprine (FLEXERIL) 5 MG tablet Take 5 mg by mouth 3 times daily as needed       No current facility-administered medications for this visit.      Allergies   Allergen Reactions    Clavulanic Acid     Amoxicillin Rash     Social History     Socioeconomic History    Marital status:      Spouse name: Not on file    Number of children: Not on file    Years of education: Not on file    Highest education level: Not on file   Occupational History    Not on file   Tobacco Use    Smoking status: Former    Smokeless tobacco: Never   Vaping Use    Vaping Use: Never used   Substance and Sexual Activity    Alcohol use: Yes     Comment: ocasionally    Drug use: Never    Sexual activity: Not Currently     Partners: Female   Other Topics Concern    Not on file   Social History Narrative    Not on file     Social Determinants of Health     Financial Resource Strain: Not on file   Food Insecurity: Not on file   Transportation Needs: Not on file   Physical Activity: Not on file   Stress: Not on file   Social Connections: Not on file   Intimate Partner Violence: Not on file   Housing Stability: Not on file     Family History Problem Relation Age of Onset    Stroke Mother         Mini    High Cholesterol Father     Diabetes Father     Stroke Father        Review of Systems  Constitutional:   Negative for fever, chills, appetite change, malaise/fatigue, headaches and weight loss. Skin:  Negative for skin lesions, rash and itching. Eyes:  Negative for visual disturbance, eye pain and eye discharge. ENT:  Negative for difficulty articulating words, pain swallowing, high frequency hearing loss and dry mouth. Respiratory:  Negative for cough, blood in sputum, shortness of breath and wheezing. Cardiovascular:  Negative for chest pain, hypertension, irregular heartbeat, leg pain, leg swelling, regular rate and rhythm and varicose veins. GI:  Negative for nausea, vomiting, abdominal pain, blood in stool, constipation, diarrhea, indigestion and heartburn. Genitourinary: Positive for hematuria. Negative for urinary burning, flank pain, recurrent UTIs, history of urolithiasis, nocturia, slower stream, straining, urgency, leakage w/ urge, frequent urination, incomplete emptying, erectile dysfunction, testicular pain, sexually transmitted disease, discharge and urethral stricture. Musculoskeletal:  Negative for back pain, bone pain, arthralgias, tenderness, muscle weakness and neck pain. Neurological:  Negative for dizziness, focal weakness, numbness, seizures and tremors. Psychological:  Negative for depression and psychiatric problem. Endocrine:  Negative for cold intolerance, thirst, excessive urination, fatigue and heat intolerance. Hem/Lymphatic:  Negative for easy bleeding, easy bruising and frequent infections. Urinalysis  UA - Dipstick  @LASTPROCAMB([id number])@    UA - Micro  WBC - 0  RBC - 0  Bacteria - 0  Epith - 0    There were no vitals taken for this visit.      GENERAL: No acute distress, Awake, Alert, Oriented X 3, Gait normal  CARDIAC: regular rate and rhythm  CHEST AND LUNG: Easy work of breathing, clear to auscultation bilaterally, no cyanosis  ABDOMEN: soft, non tender, non-distended, positive bowel sounds, no organomegaly, no palpable masses, no guarding, no rebound tenderness  SKIN: No rash, no erythema, no lacerations or abrasions, no ecchymosis  NEUROLOGIC: cranial nerves 2-12 grossly intact       Assessment and Plan    ICD-10-CM    1. Malignant neoplasm of prostate ([facility] [address]  C61       2. Erectile dysfunction due to arterial insufficiency  N52.01       3. Stress incontinence (female) (male)  N39.3       4. Gross hematuria  R31.0 CT ABDOMEN PELVIS HEMATURIA Additional Contrast? None        Prostate Cancer:   PSA < 0.1 (1/2022). Next PSA due 1/2023    Gross Hematuria:   I had a long discussion today with pt. in regards to hematuria. We discussed potential causes such as infection, friable prostatic vessels, bladder tumor, renal tumor, nephrolithiasis, or idiopathic cause. In regards to work up, serum Cr was drawn. CT abd/pelvis without contrast/with contrast/with delayed images was ordered for upper tract imaging. Lastly, pt. will follow up for cystoscopy and to go over results. ED:   NANCY given last visit. Working well. Has exhausted PO meds and injections are painful. Has not wanted IPP yet. FIORDALIZA:   Not bothersome at this time. Jamie PHILIP     Low Energy:   Discussed checking T but wants to hold off at this time. I have spent 30 minutes today reviewing previous notes, test results and face to face with the patient as well as documenting. Gold Jama M.D.     [facility] Urology  [address]  Phone: [phone]  Fax: [phone]

## 2022-10-19 ENCOUNTER — TELEPHONE (OUTPATIENT)
Dept: UROLOGY | Age: 62
End: 2022-10-19

## 2022-10-25 ENCOUNTER — HOSPITAL ENCOUNTER (OUTPATIENT)
Dept: CT IMAGING | Age: 62
Discharge: HOME | End: 2022-10-28
Payer: COMMERCIAL

## 2022-10-25 DIAGNOSIS — R31.0 GROSS HEMATURIA: ICD-10-CM

## 2022-10-25 LAB — CREAT BLD-MCNC: 0.68 MG/DL (ref 0.8–1.5)

## 2022-10-25 PROCEDURE — 82565 ASSAY OF CREATININE: CPT

## 2022-10-25 PROCEDURE — 2580000003 HC RX 258: Performed by: UROLOGY

## 2022-10-25 PROCEDURE — 6360000004 HC RX CONTRAST MEDICATION: Performed by: UROLOGY

## 2022-10-25 PROCEDURE — 74178 CT ABD&PLV WO CNTR FLWD CNTR: CPT

## 2022-10-25 RX ORDER — 0.9 % SODIUM CHLORIDE 0.9 %
100 INTRAVENOUS SOLUTION INTRAVENOUS
Status: COMPLETED | OUTPATIENT
Start: 2022-10-25 | End: 2022-10-25

## 2022-10-25 RX ORDER — SODIUM CHLORIDE 0.9 % (FLUSH) 0.9 %
10 SYRINGE (ML) INJECTION
Status: COMPLETED | OUTPATIENT
Start: 2022-10-25 | End: 2022-10-25

## 2022-10-25 RX ADMIN — SODIUM CHLORIDE, PRESERVATIVE FREE 10 ML: 5 INJECTION INTRAVENOUS at 14:11

## 2022-10-25 RX ADMIN — IOPAMIDOL 100 ML: 755 INJECTION, SOLUTION INTRAVENOUS at 14:10

## 2022-10-25 RX ADMIN — SODIUM CHLORIDE 100 ML: 9 INJECTION, SOLUTION INTRAVENOUS at 14:11

## 2022-11-07 ENCOUNTER — PROCEDURE VISIT (OUTPATIENT)
Dept: UROLOGY | Age: 62
End: 2022-11-07
Payer: COMMERCIAL

## 2022-11-07 DIAGNOSIS — N52.01 ERECTILE DYSFUNCTION DUE TO ARTERIAL INSUFFICIENCY: ICD-10-CM

## 2022-11-07 DIAGNOSIS — C61 MALIGNANT NEOPLASM OF PROSTATE (HCC): Primary | ICD-10-CM

## 2022-11-07 DIAGNOSIS — N30.40 RADIATION CYSTITIS: ICD-10-CM

## 2022-11-07 DIAGNOSIS — R31.0 GROSS HEMATURIA: ICD-10-CM

## 2022-11-07 LAB
BILIRUBIN, URINE, POC: NEGATIVE
BLOOD URINE, POC: NORMAL
GLUCOSE URINE, POC: NEGATIVE
KETONES, URINE, POC: NEGATIVE
LEUKOCYTE ESTERASE, URINE, POC: NEGATIVE
NITRITE, URINE, POC: NEGATIVE
PH, URINE, POC: 5.5 (ref 4.6–8)
PROTEIN,URINE, POC: NEGATIVE
SPECIFIC GRAVITY, URINE, POC: 1.02 (ref 1–1.03)
URINALYSIS CLARITY, POC: NORMAL
URINALYSIS COLOR, POC: NORMAL
UROBILINOGEN, POC: NORMAL

## 2022-11-07 PROCEDURE — 99214 OFFICE O/P EST MOD 30 MIN: CPT | Performed by: UROLOGY

## 2022-11-07 PROCEDURE — 81003 URINALYSIS AUTO W/O SCOPE: CPT | Performed by: UROLOGY

## 2022-11-07 PROCEDURE — 52000 CYSTOURETHROSCOPY: CPT | Performed by: UROLOGY

## 2022-11-07 NOTE — PROGRESS NOTES
[facility]  [address]   [address]  [facility], [address]  [phone]    Raiza Lazcano  : 1960         HPI   57 y.o.  male who presents for cystoscopy for gross hematuria work up. He has a PMH of shanelle 7 CAP s/p RALP + adjuvant XRT for pT3 disease in 2014 in [place]. Seen 2022 by me. PSA has been < 0.1 since treatment. His main complaint today is gross hematuria. Today, he reports 1 month of gross hematuria of unknown origin. States that it comes/goes ever day. He has not had hematuria work up. Hematuria is painless. FIORDALIZA requires that the wear a bad. FIORDALIZA worse with fatigue and while sleeping. Not performing kegel exercises and has learned to live with FIORDALIZA treatment. ED has been since surgery. He currently has success with NANCY but states that it is difficult at times. He has tried Cialis and Viagra without success. He also tried trimix, but had pain with injections. He switched to bimix but states that it did not work. He wants to discuss options today. On review, he does report fatigue/low libido suggestive of low T. He has not tried TRT due to history of CAP and does not want it at this time. CT A/P prior to appointment today shows 2 mm R renal pelvis stone, non-obstructing. He has no flank pain / other symptoms.       PSA < 0.1 (2022) with PCP        Past Medical History:   Diagnosis Date    A-fib [facility])     managed with med    BPH (benign prostatic hyperplasia)     Cancer (HCC)     basal  cell    Hx of blood clots     Hypertension     DANE (obstructive sleep apnea)     Personal history of prostate cancer 2012    Pulmonary emboli ([address] 2022    Bilateral    Sarcoidosis      Past Surgical History:   Procedure Laterality Date    ARM SURGERY Right 2022    right cubital tunnel release performed by Willa Fernandez MD at [address]      2015 x 2 and 2016 (a total of 3)    CARPAL TUNNEL RELEASE Left     CARPAL TUNNEL RELEASE Right 7/28/2022    right carpal tunnel release and flexor synovectomy performed by Pino Landry MD at [address] [facility] Right 7/28/2022    right middle trigger finger release performed by Pino Landry MD at [facility] [facility]      3 knee surgeries right knee 1 surgery left knee     Current Outpatient Medications   Medication Sig Dispense Refill    hydroCHLOROthiazide (MICROZIDE) 12.5 MG capsule Take 1 capsule by mouth every morning 90 capsule 1    losartan (COZAAR) 25 MG tablet Take 1 tablet by mouth daily 90 tablet 1    apixaban (ELIQUIS) 5 MG TABS tablet Take 1 tablet by mouth 2 times daily 180 tablet 1    atorvastatin (LIPITOR) 40 MG tablet Take 1 tablet by mouth daily 90 tablet 1    esomeprazole (NEXIUM) 40 MG delayed release capsule Take 1 capsule by mouth daily 90 capsule 1    cyclobenzaprine (FLEXERIL) 5 MG tablet Take 5 mg by mouth 3 times daily as needed      meloxicam (MOBIC) 15 MG tablet Take 1 tablet by mouth daily 42 tablet 0     No current facility-administered medications for this visit.      Allergies   Allergen Reactions    Clavulanic Acid     Amoxicillin Rash     Social History     Socioeconomic History    Marital status:      Spouse name: Not on file    Number of children: Not on file    Years of education: Not on file    Highest education level: Not on file   Occupational History    Not on file   Tobacco Use    Smoking status: Former    Smokeless tobacco: Never   Vaping Use    Vaping Use: Never used   Substance and Sexual Activity    Alcohol use: Yes     Comment: ocasionally    Drug use: Never    Sexual activity: Not Currently     Partners: Female   Other Topics Concern    Not on file   Social History Narrative    Not on file     Social Determinants of Health     Financial Resource Strain: Not on file   Food Insecurity: Not on file   Transportation Needs: Not on file   Physical Activity: Not on file   Stress: Not on file   Social Connections: Not on file   Intimate Partner Violence: Not on file   Housing Stability: Not on file     Family History   Problem Relation Age of Onset    Stroke Mother         Mini    High Cholesterol Father     Diabetes Father     Stroke Father        There were no vitals taken for this visit. UA - Dipstick  Results for orders placed or performed in visit on 11/07/22   AMB POC URINALYSIS DIP STICK AUTO W/O MICRO   Result Value Ref Range    Color (UA POC)      Clarity (UA POC)      Glucose, Urine, POC Negative Negative    Bilirubin, Urine, POC Negative Negative    KETONES, Urine, POC Negative Negative    Specific Gravity, Urine, POC 1.020 1.001 - 1.035    Blood (UA POC) Moderate Negative    pH, Urine, POC 5.5 4.6 - 8.0    Protein, Urine, POC Negative Negative    Urobilinogen, POC 0.2 mg/dL     Nitrite, Urine, POC Negative Negative    Leukocyte Esterase, Urine, POC Negative Negative   Results for orders placed or performed in visit on 10/06/22   AMB POC URINALYSIS DIP STICK AUTO W/O MICRO   Result Value Ref Range    Color, Urine, POC yellow     Clarity, Urine, POC clear     Glucose, Urine, POC Negative Negative    Bilirubin, Urine, POC Negative Negative    Ketones, Urine, POC Negative Negative    Specific Gravity, Urine, POC 1.015 1.001 - 1.035    Blood, Urine, POC nega Negative    pH, Urine, POC 5.5 4.6 - 8.0    Protein, Urine, POC Negative Negative    Urobilinogen, POC 0.2     Nitrate, Urine, POC Negative Negative    Leukocyte Esterase, Urine, POC Negative Negative       UA - Micro  WBC - 0  RBC - 0  Bacteria - 0  Epith - 0    There were no vitals taken for this visit.      GENERAL: No acute distress, Awake, Alert, Oriented X 3, Gait normal  CARDIAC: regular rate and rhythm  CHEST AND LUNG: Easy work of breathing, clear to auscultation bilaterally, no cyanosis  ABDOMEN: soft, non tender, non-distended, positive bowel sounds, no organomegaly, no palpable masses, no guarding, no rebound tenderness  SKIN: No rash, no erythema, no lacerations or abrasions, no ecchymosis  NEUROLOGIC: cranial nerves 2-12 grossly intact         Cystoscopy Procedure:     Procedure Room # 3  Scope ID: dispo  Assistant: mariaelena      All risks, benefits and alternatives were again reviewed with patient and he is willing to proceed at this time. His genital area was prepped and draped and a sterile field applied. 2% lidocaine jelly was injected in the the urethra and allowed to dwell for several minutes. A flexible cystoscope was then inserted into the urethral meatus and advanced under direct vision. The anterior and posterior urethra appeared normal in appearance. The prostatic urethra was open without significant hypertrophy. The bladder was systematically surveyed. No bladder trabeculations were seen. No mucosal abnormalities were seen. Hypervascularity at bladder neck anastomosis most consistent with radiation cystitis. This area is likely source of bleed. No active bleeding today. The ureteral orifices were seen in their normal orthotopic position. The cystoscope was then removed under direct vision. The patient tolerated the procedure well. Assessment and Plan    ICD-10-CM    1. Malignant neoplasm of prostate (HCC)  C61 CYSTOURETHROSCOPY     AMB POC URINALYSIS DIP STICK AUTO W/O MICRO     PSA, Diagnostic     PSA, Diagnostic     PSA, Diagnostic      2. Erectile dysfunction due to arterial insufficiency  N52.01       3. Gross hematuria  R31.0       4. Radiation cystitis  N30.40         Prostate Cancer:   PSA due today. Will call with results and then follow up 1 year with PSA    Gross Hematuria:   Stone in R kidney on CT OR radiation cystitis are sources. No tumor    R Kidney Stone:  2 mm small and non-obstructing. Only intervene if symptomatic. Radiation Cystitis:   Likely source of hematuria. Increased vascularity at bladder neck. Resolved today with no active bleeding.   Discussed that should it return, would consider cysto/fulguration of bleeders or hyperbaric oxygen in persistent cases. Patient had a complete established visit today for kidney stone, prostate cancer, radiation cystitis  that is separately identifiable from procedure performed today for gross hematuria. Complete documentation of this separate visit is present. I have spent 30 minutes today reviewing previous notes, test results and face to face with the patient as well as documenting. Gold Valencia M.D.     [facility]  [address]  Phone: [phone]  Fax: [phone]

## 2022-11-07 NOTE — LETTER
[facility]  [address] [place] [address] Road [address]  Phone: [phone]  Fax: [phone]    June Claude, MD    November 7, 2022     Rach Danielle DO  [address]    Patient: Viviana Malik   MR Number: [id number]   YOB: 1960   Date of Visit: 11/7/2022       Dear Rach Danielle: Thank you for referring Mena Sánchez to me for evaluation/treatment. Below are the relevant portions of my assessment and plan of care. If you have questions, please do not hesitate to call me. I look forward to following Ramon Collins along with you.     Sincerely,      June Claude, MD

## 2022-11-08 ENCOUNTER — OFFICE VISIT (OUTPATIENT)
Dept: ORTHOPEDIC SURGERY | Age: 62
End: 2022-11-08
Payer: COMMERCIAL

## 2022-11-08 DIAGNOSIS — G56.02 LEFT CARPAL TUNNEL SYNDROME: ICD-10-CM

## 2022-11-08 DIAGNOSIS — G56.01 RIGHT CARPAL TUNNEL SYNDROME: ICD-10-CM

## 2022-11-08 DIAGNOSIS — M65.331 TRIGGER MIDDLE FINGER OF RIGHT HAND: ICD-10-CM

## 2022-11-08 DIAGNOSIS — G56.21 CUBITAL TUNNEL SYNDROME, RIGHT: Primary | ICD-10-CM

## 2022-11-08 DIAGNOSIS — G56.22 CUBITAL TUNNEL SYNDROME ON LEFT: ICD-10-CM

## 2022-11-08 LAB — PSA SERPL-MCNC: <0.1 NG/ML

## 2022-11-08 PROCEDURE — 99214 OFFICE O/P EST MOD 30 MIN: CPT | Performed by: ORTHOPAEDIC SURGERY

## 2022-11-08 NOTE — RESULT ENCOUNTER NOTE
Mr. Michelle Blackburndana, your PSA is very low < 0.1. This is great news! Please plan to keep your follow up with me as scheduled next year.       Dickson Bonilla,  Dr. Valdemar Betts

## 2022-11-08 NOTE — PROGRESS NOTES
Orthopaedic Hand Surgery Note    Name: Carissa Vernon  Age: 57 y.o. YOB: 1960  Gender: male  MRN: [id number]    Post Operative Visit: right cubital tunnel release - Right, right carpal tunnel release and flexor synovectomy - Right, and right middle trigger finger release - Right    HPI: Patient is status post right cubital tunnel release - Right, right carpal tunnel release and flexor synovectomy - Right, and right middle trigger finger release - Right on 7/28/2022. Patient reports improvement in sensation in the sense that before surgery his numbness on the ulnar distribution was at all times and at this point its on and off and only aggravated with repetitive elbow flexion, the right middle trigger finger continues to progress, she feels like he has full motion but still lacking strength, he continues to complain of numbness and paresthesias in the left hand mostly in the ulnar distribution. To recap his history he had bilateral carpal tunnel release over 40 years ago but not cubital tunnel release, his symptoms have been more consistent with cubital tunnel syndrome but some of his symptoms are also suggestive of carpal tunnel syndrome which is why we performed both carpal and cubital tunnel release on the right. Physical Examination:  Well-healed surgical wounds. Sensation is intact in all fingers. Motor exam reveals no deficits. Ulnar nerve appears stable posterior to the medial epicondyle. Examination of the left upper extremity demonstrates slightly decree sensation in ulnar distribution more so than median distribution, positive carpal tunnel compression test, positive Tinel at the cubital tunnel, no thenar or interossei weakness or wasting.     Imaging:     Nerve conduction study was again reviewed which demonstrates mild bilateral carpal tunnel syndrome with sensory latency of 3.7 on the right and 3.3 on the left, motor latency of 3.9 on the right and 4.5 on the left, bilateral cubital tunnel syndrome with decrease in motor conduction velocity across the elbow segment from 51-34 on the right and from 51-40 on the left    Assessment:   1. Cubital tunnel syndrome, right    2. Right carpal tunnel syndrome    3. Trigger middle finger of right hand    4. Left carpal tunnel syndrome    5. Cubital tunnel syndrome on left         Status post right cubital tunnel release - Right, right carpal tunnel release and flexor synovectomy - Right, and right middle trigger finger release - Right on 7/28/2022    Plan:  We discussed the post operative course and progression. Observation for now, patient initially inquired about doing surgery on the left side however, given the mild improvement that he reports on the right side I would strongly advised the patient to wait 6 more months before deciding on surgery on the left side, although at this point he feels like numbness in the right side is improved given that his symptoms are only intermittent rather than persistent, he has not had the profound resolution that he was expecting. We did discuss that carpal and cubital tunnel syndrome are progressive this thesis and more likely than not will aggravate with time, at this point he does not have significant interossei wasting and the cubital tunnel syndrome on the left side is moderate, his motor conduction velocity is 40 at the elbow segment, this is something that we will continue to watch closely and I will reassess in 6 months.   If 9 to 12 months after surgery on the right side he reports minimal to no progression of sensory recovery we may elect to obtain a nerve conduction study to assess ulnar nerve function and compare that to before surgery    Matilde Koch MD  11/08/22  4:56 PM

## 2022-12-08 NOTE — ASSESSMENT & PLAN NOTE
- Of note [No Acute Distress] : no acute distress [Well Nourished] : well nourished [Well Developed] : well developed [Well-Appearing] : well-appearing [Normal Sclera/Conjunctiva] : normal sclera/conjunctiva [PERRL] : pupils equal round and reactive to light [EOMI] : extraocular movements intact [Normal Outer Ear/Nose] : the outer ears and nose were normal in appearance [Normal Oropharynx] : the oropharynx was normal [No JVD] : no jugular venous distention [No Lymphadenopathy] : no lymphadenopathy [Supple] : supple [Thyroid Normal, No Nodules] : the thyroid was normal and there were no nodules present [No Respiratory Distress] : no respiratory distress  [No Accessory Muscle Use] : no accessory muscle use [Clear to Auscultation] : lungs were clear to auscultation bilaterally [Normal Rate] : normal rate  [Regular Rhythm] : with a regular rhythm [Normal S1, S2] : normal S1 and S2 [No Murmur] : no murmur heard [No Carotid Bruits] : no carotid bruits [No Abdominal Bruit] : a ~M bruit was not heard ~T in the abdomen [No Varicosities] : no varicosities [Pedal Pulses Present] : the pedal pulses are present [No Edema] : there was no peripheral edema [No Palpable Aorta] : no palpable aorta [No Extremity Clubbing/Cyanosis] : no extremity clubbing/cyanosis [Soft] : abdomen soft [Non Tender] : non-tender [Non-distended] : non-distended [No Masses] : no abdominal mass palpated [No HSM] : no HSM [Normal Bowel Sounds] : normal bowel sounds [Normal Posterior Cervical Nodes] : no posterior cervical lymphadenopathy [Normal Anterior Cervical Nodes] : no anterior cervical lymphadenopathy [No CVA Tenderness] : no CVA  tenderness [No Spinal Tenderness] : no spinal tenderness [No Joint Swelling] : no joint swelling [Grossly Normal Strength/Tone] : grossly normal strength/tone [No Rash] : no rash [Coordination Grossly Intact] : coordination grossly intact [No Focal Deficits] : no focal deficits [Normal Gait] : normal gait [Deep Tendon Reflexes (DTR)] : deep tendon reflexes were 2+ and symmetric [Normal Affect] : the affect was normal [Normal Insight/Judgement] : insight and judgment were intact

## 2023-01-29 DIAGNOSIS — I10 PRIMARY HYPERTENSION: ICD-10-CM

## 2023-01-29 DIAGNOSIS — I48.11 LONGSTANDING PERSISTENT ATRIAL FIBRILLATION (HCC): ICD-10-CM

## 2023-01-29 DIAGNOSIS — E78.00 PURE HYPERCHOLESTEROLEMIA, UNSPECIFIED: ICD-10-CM

## 2023-01-29 DIAGNOSIS — K21.00 GASTROESOPHAGEAL REFLUX DISEASE WITH ESOPHAGITIS, UNSPECIFIED WHETHER HEMORRHAGE: ICD-10-CM

## 2023-02-01 NOTE — TELEPHONE ENCOUNTER
I reviewed the pts chart. He was last seen 10/6/22. His next appt is 2/27/23. The pts Losartan 25mg and HCTZ 12.5mg were sent to [facility] on 10/6/22 for 90 days with a refill. The pharmacy should have refills for these Rxs. However, the Atorvastatin 40mg, Esomeprazole 40mg, and Eliquis 5mg were last written 8/25/22 for 90 days with a refill. These 3 medications are set to run out on 2/21/23, which is 6 days prior to his appt. I will forward the msg to Dr. Annie Hicks to review and prescribe if necessary.

## 2023-02-02 RX ORDER — HYDROCHLOROTHIAZIDE 12.5 MG/1
CAPSULE, GELATIN COATED ORAL
Qty: 90 CAPSULE | Refills: 1 | Status: SHIPPED | OUTPATIENT
Start: 2023-02-02

## 2023-02-02 RX ORDER — APIXABAN 5 MG/1
TABLET, FILM COATED ORAL
Qty: 180 TABLET | Refills: 1 | Status: SHIPPED | OUTPATIENT
Start: 2023-02-02

## 2023-02-02 RX ORDER — ESOMEPRAZOLE MAGNESIUM 40 MG/1
CAPSULE, DELAYED RELEASE ORAL
Qty: 90 CAPSULE | Refills: 1 | Status: SHIPPED | OUTPATIENT
Start: 2023-02-02

## 2023-02-02 RX ORDER — LOSARTAN POTASSIUM 25 MG/1
TABLET ORAL
Qty: 90 TABLET | Refills: 1 | Status: SHIPPED | OUTPATIENT
Start: 2023-02-02

## 2023-02-02 RX ORDER — ATORVASTATIN CALCIUM 40 MG/1
TABLET, FILM COATED ORAL
Qty: 90 TABLET | Refills: 1 | Status: SHIPPED | OUTPATIENT
Start: 2023-02-02

## 2023-02-27 ENCOUNTER — OFFICE VISIT (OUTPATIENT)
Dept: INTERNAL MEDICINE CLINIC | Facility: CLINIC | Age: 63
End: 2023-02-27
Payer: COMMERCIAL

## 2023-02-27 VITALS
DIASTOLIC BLOOD PRESSURE: 74 MMHG | HEART RATE: 70 BPM | BODY MASS INDEX: 38.7 KG/M2 | TEMPERATURE: 98.1 F | OXYGEN SATURATION: 97 % | SYSTOLIC BLOOD PRESSURE: 116 MMHG | HEIGHT: 73 IN | WEIGHT: 292 LBS

## 2023-02-27 DIAGNOSIS — D64.9 NORMOCYTIC ANEMIA: ICD-10-CM

## 2023-02-27 DIAGNOSIS — G56.03 BILATERAL CARPAL TUNNEL SYNDROME: ICD-10-CM

## 2023-02-27 DIAGNOSIS — R73.03 PREDIABETES: ICD-10-CM

## 2023-02-27 DIAGNOSIS — I10 PRIMARY HYPERTENSION: Primary | ICD-10-CM

## 2023-02-27 DIAGNOSIS — I48.11 LONGSTANDING PERSISTENT ATRIAL FIBRILLATION (HCC): ICD-10-CM

## 2023-02-27 DIAGNOSIS — E55.9 VITAMIN D DEFICIENCY: ICD-10-CM

## 2023-02-27 DIAGNOSIS — I10 PRIMARY HYPERTENSION: ICD-10-CM

## 2023-02-27 DIAGNOSIS — D86.9 SARCOIDOSIS: ICD-10-CM

## 2023-02-27 DIAGNOSIS — E78.00 PURE HYPERCHOLESTEROLEMIA, UNSPECIFIED: ICD-10-CM

## 2023-02-27 DIAGNOSIS — K21.00 GASTROESOPHAGEAL REFLUX DISEASE WITH ESOPHAGITIS, UNSPECIFIED WHETHER HEMORRHAGE: ICD-10-CM

## 2023-02-27 LAB
ALBUMIN SERPL-MCNC: 3.9 G/DL (ref 3.2–4.6)
ALBUMIN/GLOB SERPL: 1.3 (ref 0.4–1.6)
ALP SERPL-CCNC: 109 U/L (ref 50–136)
ALT SERPL-CCNC: 29 U/L (ref 12–65)
ANION GAP SERPL CALC-SCNC: 6 MMOL/L (ref 2–11)
AST SERPL-CCNC: 22 U/L (ref 15–37)
BILIRUB SERPL-MCNC: 0.9 MG/DL (ref 0.2–1.1)
BUN SERPL-MCNC: 15 MG/DL (ref 8–23)
CALCIUM SERPL-MCNC: 9.3 MG/DL (ref 8.3–10.4)
CHLORIDE SERPL-SCNC: 109 MMOL/L (ref 101–110)
CO2 SERPL-SCNC: 25 MMOL/L (ref 21–32)
CREAT SERPL-MCNC: 0.9 MG/DL (ref 0.8–1.5)
ERYTHROCYTE [DISTWIDTH] IN BLOOD BY AUTOMATED COUNT: 13.4 % (ref 11.9–14.6)
GLOBULIN SER CALC-MCNC: 2.9 G/DL (ref 2.8–4.5)
GLUCOSE SERPL-MCNC: 117 MG/DL (ref 65–100)
HCT VFR BLD AUTO: 41.7 % (ref 41.1–50.3)
HGB BLD-MCNC: 13.7 G/DL (ref 13.6–17.2)
MCH RBC QN AUTO: 30.8 PG (ref 26.1–32.9)
MCHC RBC AUTO-ENTMCNC: 32.9 G/DL (ref 31.4–35)
MCV RBC AUTO: 93.7 FL (ref 82–102)
NRBC # BLD: 0 K/UL (ref 0–0.2)
PLATELET # BLD AUTO: 217 K/UL (ref 150–450)
PMV BLD AUTO: 11.4 FL (ref 9.4–12.3)
POTASSIUM SERPL-SCNC: 4.2 MMOL/L (ref 3.5–5.1)
PROT SERPL-MCNC: 6.8 G/DL (ref 6.3–8.2)
RBC # BLD AUTO: 4.45 M/UL (ref 4.23–5.6)
SODIUM SERPL-SCNC: 140 MMOL/L (ref 133–143)
WBC # BLD AUTO: 7.1 K/UL (ref 4.3–11.1)

## 2023-02-27 PROCEDURE — 3078F DIAST BP <80 MM HG: CPT | Performed by: INTERNAL MEDICINE

## 2023-02-27 PROCEDURE — 3074F SYST BP LT 130 MM HG: CPT | Performed by: INTERNAL MEDICINE

## 2023-02-27 PROCEDURE — 99214 OFFICE O/P EST MOD 30 MIN: CPT | Performed by: INTERNAL MEDICINE

## 2023-02-27 RX ORDER — CYCLOBENZAPRINE HCL 5 MG
5 TABLET ORAL 3 TIMES DAILY PRN
Qty: 90 TABLET | Refills: 3 | Status: SHIPPED | OUTPATIENT
Start: 2023-02-27 | End: 2023-03-29

## 2023-02-27 RX ORDER — ATORVASTATIN CALCIUM 40 MG/1
TABLET, FILM COATED ORAL
Qty: 90 TABLET | Refills: 1 | Status: SHIPPED | OUTPATIENT
Start: 2023-02-27

## 2023-02-27 RX ORDER — LOSARTAN POTASSIUM 25 MG/1
TABLET ORAL
Qty: 90 TABLET | Refills: 1 | Status: SHIPPED | OUTPATIENT
Start: 2023-02-27

## 2023-02-27 RX ORDER — CHOLECALCIFEROL (VITAMIN D3) 125 MCG
2 CAPSULE ORAL DAILY
Qty: 90 CAPSULE | Refills: 5 | Status: CANCELLED | OUTPATIENT
Start: 2023-02-27

## 2023-02-27 RX ORDER — ESOMEPRAZOLE MAGNESIUM 40 MG/1
CAPSULE, DELAYED RELEASE ORAL
Qty: 90 CAPSULE | Refills: 1 | Status: SHIPPED | OUTPATIENT
Start: 2023-02-27

## 2023-02-27 RX ORDER — HYDROCHLOROTHIAZIDE 12.5 MG/1
CAPSULE, GELATIN COATED ORAL
Qty: 90 CAPSULE | Refills: 1 | Status: CANCELLED | OUTPATIENT
Start: 2023-02-27

## 2023-02-27 SDOH — ECONOMIC STABILITY: HOUSING INSECURITY
IN THE LAST 12 MONTHS, WAS THERE A TIME WHEN YOU DID NOT HAVE A STEADY PLACE TO SLEEP OR SLEPT IN A SHELTER (INCLUDING NOW)?: NO

## 2023-02-27 SDOH — ECONOMIC STABILITY: FOOD INSECURITY: WITHIN THE PAST 12 MONTHS, YOU WORRIED THAT YOUR FOOD WOULD RUN OUT BEFORE YOU GOT MONEY TO BUY MORE.: NEVER TRUE

## 2023-02-27 SDOH — ECONOMIC STABILITY: INCOME INSECURITY: HOW HARD IS IT FOR YOU TO PAY FOR THE VERY BASICS LIKE FOOD, HOUSING, MEDICAL CARE, AND HEATING?: NOT VERY HARD

## 2023-02-27 SDOH — ECONOMIC STABILITY: FOOD INSECURITY: WITHIN THE PAST 12 MONTHS, THE FOOD YOU BOUGHT JUST DIDN'T LAST AND YOU DIDN'T HAVE MONEY TO GET MORE.: NEVER TRUE

## 2023-02-27 ASSESSMENT — PATIENT HEALTH QUESTIONNAIRE - PHQ9
1. LITTLE INTEREST OR PLEASURE IN DOING THINGS: 0
SUM OF ALL RESPONSES TO PHQ QUESTIONS 1-9: 0
SUM OF ALL RESPONSES TO PHQ9 QUESTIONS 1 & 2: 0
SUM OF ALL RESPONSES TO PHQ QUESTIONS 1-9: 0
SUM OF ALL RESPONSES TO PHQ QUESTIONS 1-9: 0
2. FEELING DOWN, DEPRESSED OR HOPELESS: 0
SUM OF ALL RESPONSES TO PHQ QUESTIONS 1-9: 0

## 2023-02-27 NOTE — ASSESSMENT & PLAN NOTE
Hx of sarcoid. Low D2 levels. Normal D3 levels. Not currently supplementing. 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 Offering referral to Pulmonology for further consideration and treatment. Patient also would like to discontinue Eliquis 5 bid.

## 2023-02-27 NOTE — ASSESSMENT & PLAN NOTE
Patients blood pressure well controlled on two agents. Will offer discontinuance of HCTZ. Checking CMP.

## 2023-02-27 NOTE — PROGRESS NOTES
Odalys Thomason (: 1960) is a 57 y.o. male, here for evaluation of the following chief complaint(s):  6 Month Follow-Up (Pt is here for routine check up. Pt is not fasting today.) and Medication Refill       ASSESSMENT/PLAN:  1. Primary hypertension  Assessment & Plan:   Patients blood pressure well controlled on two agents. Will offer discontinuance of HCTZ. Checking CMP. Orders:  -     losartan (COZAAR) 25 MG tablet; TAKE 1 TABLET BY MOUTH EVERY DAY, Disp-90 tablet, R-1Normal  -     Comprehensive Metabolic Panel; Future  2. Pure hypercholesterolemia, unspecified  -     atorvastatin (LIPITOR) 40 MG tablet; TAKE 1 TABLET BY MOUTH EVERY DAY, Disp-90 tablet, R-1Normal  3. Gastroesophageal reflux disease with esophagitis, unspecified whether hemorrhage  Assessment & Plan: Tolerating Nexium 40. Patient on Eliquis 5 bid. Previously using Celebrex, but no longer. Orders:  -     esomeprazole (NEXIUM) 40 MG delayed release capsule; TAKE 1 CAPSULE BY MOUTH EVERY DAY, Disp-90 capsule, R-1Normal  4. Longstanding persistent atrial fibrillation (HCC)  Assessment & Plan:  Maintained on Eliquis 5 bid. Hemoglobin 13.4 on the low end 8 months ago. Will offer CBC at this time. Patient has not has any issues since last ablation in 2016. Eliquis 5 bid has been in place for PE though to be caused by recent travel from [place] and effort of moving. He has not seen pulmonology in quite some time. Orders:  -     apixaban (ELIQUIS) 5 MG TABS tablet; TAKE 1 TABLET BY MOUTH TWICE A DAY, Disp-180 tablet, R-1Normal  5. Normocytic anemia  -     CBC; Future  6. Bilateral carpal tunnel syndrome  Assessment & Plan:   Patient with recurrent surgical release. Patients recurrent symptoms may be a consequence of sarcoid. Orders:  -     cyclobenzaprine (FLEXERIL) 5 MG tablet; Take 1 tablet by mouth 3 times daily as needed for Muscle spasms, Disp-90 tablet, R-3Normal  7.  Sarcoidosis  Assessment & Plan:  Patients Vitamin D2  levels 11.5 8 months ago. Last Vitamin D3 (calcitriol) 55.5 on 1/19/2022. Offering referral to pulmonology for consideration of discontinuation of Eliquis and evaluation of sarcoid and vitamin D discrepancies. Orders:  -     Shea Burgos MD, Pulmonology, [place]  8. Prediabetes  Assessment & Plan:     Hemoglobin A1C   Date Value Ref Range Status   07/12/2022 5.9 (H) 4.8 - 5.6 % Final     Patients weight has increased since 8/2022. Recommending dietary moderation. Will offer A1c check. 9. Vitamin D deficiency           SUBJECTIVE/OBJECTIVE:  HPI: Patient is a very pleasant 69-year-old man with medical history significant for sarcoidosis, vitamin D deficiency, hypertension, fibrillation, use of long-term anticoagulation, hyperlipidemia, bilateral carpal tunnel syndrome status post release, presents for routine follow-up visit. Patient blood pressure very well controlled today in office at 109/75. Patient is maintained on 2 agents, losartan 25 and hydrochlorothiazide 12.5. We will offer patient discontinuation of hydrochlorothiazide. Patient checking home numbers with readings below 120 consistently. Patient is up 10 pounds since last office visit in August 2022. We will offer patient routine labs at this time. Patient with significant vitamin D deficiency previously and not currently supplementing. Recommending 10,000 international units of vitamin D3 on a daily basis. but with Calcitriol level at 55. Patient has not seen pulmonology in quite some time. He's been maintained on Eliquis 5 mg bid for over a year. Patients atrial fibrillation has been stable since last ablation in 2016.    Social History     Tobacco Use    Smoking status: Former    Smokeless tobacco: Never   Vaping Use    Vaping Use: Never used   Substance Use Topics    Alcohol use: Yes     Comment: ocasionally    Drug use: Never     Vitals:    02/27/23 0902   BP: 116/74   Pulse: 70   Temp: 98.1 °F (36.7 °C)   SpO2: 97% Weight: 292 lb (132.5 kg)   Height: 6' 1\" (1.854 m)      Body mass index is 38.52 kg/m². Physical Exam  HENT:      Head: Normocephalic. Eyes:      Extraocular Movements: Extraocular movements intact. Cardiovascular:      Rate and Rhythm: Normal rate and regular rhythm. Pulses: Normal pulses. Skin:     General: Skin is warm and dry. An electronic signature was used to authenticate this note.   Jefe Thornton, DO

## 2023-02-27 NOTE — ACP (ADVANCE CARE PLANNING)
Advance Care Planning   Healthcare Decision Maker:    Primary Decision Maker: Beatrice Curiel - [facility] - [phone]    Click here to complete Healthcare Decision Makers including selection of the Healthcare Decision Maker Relationship (ie \"Primary\"). Today we documented Decision Maker(s) consistent with Legal Next of Kin hierarchy.        If the relationship to the patient does NOT follow our state's Next of Kin hierarchy, the patient MUST complete an ACP Document to allow him/her to act on the patient's behalf. :

## 2023-02-27 NOTE — ASSESSMENT & PLAN NOTE
Patient with recurrent surgical release. Patients recurrent symptoms may be a consequence of sarcoid.

## 2023-02-27 NOTE — ASSESSMENT & PLAN NOTE
Maintained on Eliquis 5 bid. Hemoglobin 13.4 on the low end 8 months ago. Will offer CBC at this time. Patient has not has any issues since last ablation in 2016. Eliquis 5 bid has been in place for PE though to be caused by recent travel from [place] and effort of moving. He has not seen pulmonology in quite some time.

## 2023-02-27 NOTE — ASSESSMENT & PLAN NOTE
Hemoglobin A1C   Date Value Ref Range Status   07/12/2022 5.9 (H) 4.8 - 5.6 % Final     Patients weight has increased since 8/2022. Recommending dietary moderation. Will offer A1c check.

## 2023-02-27 NOTE — ASSESSMENT & PLAN NOTE
Patients Vitamin D2  levels 11.5 8 months ago. Last Vitamin D3 (calcitriol) 55.5 on 1/19/2022. Offering referral to pulmonology for consideration of discontinuation of Eliquis and evaluation of sarcoid and vitamin D discrepancies.

## 2023-03-22 NOTE — PROGRESS NOTES
range of motion of neck        [] Abnormal -     Neurological:        [x] No Facial Asymmetry (Cranial nerve 7 motor function) (limited exam due to video visit)          [x] No gaze palsy        [] Abnormal -         Skin:        [x] No significant exanthematous lesions or discoloration noted on facial skin         [] Abnormal -            Psychiatric:       [x] Normal Affect [] Abnormal -       [x] No Hallucinations    Other pertinent observable physical exam findings:-      ASSESSMENT:  (Medical Decision Making)      Diagnosis Orders   1. Obstructive sleep apnea (adult) (pediatric)     AHI is well controlled. He is using his machine nightly. Continue nightly compliance   2. Hypoxemia     FRANSICO with adequate oxygenation on cpap therapy         PLAN:  Continue APAP. He is purchasing supplies on [employer]. He states that a supply order is not needed     Follow up will be in 1 year or sooner if needed       Collaborating Physician: Dr. Hart Cabot      I spent at least 16 minutes with this established patient, and >50% of the time was spent counseling and/or coordinating care regarding DANE.     Vick Ventura APRN - CNP  Electronically signed

## 2023-03-27 ENCOUNTER — TELEMEDICINE (OUTPATIENT)
Dept: SLEEP MEDICINE | Age: 63
End: 2023-03-27
Payer: COMMERCIAL

## 2023-03-27 DIAGNOSIS — G47.33 OBSTRUCTIVE SLEEP APNEA (ADULT) (PEDIATRIC): Primary | ICD-10-CM

## 2023-03-27 DIAGNOSIS — R09.02 HYPOXEMIA: ICD-10-CM

## 2023-03-27 PROCEDURE — 99213 OFFICE O/P EST LOW 20 MIN: CPT | Performed by: NURSE PRACTITIONER

## 2023-03-27 ASSESSMENT — SLEEP AND FATIGUE QUESTIONNAIRES
HOW LIKELY ARE YOU TO NOD OFF OR FALL ASLEEP WHEN YOU ARE A PASSENGER IN A CAR FOR AN HOUR WITHOUT A BREAK: 0
HOW LIKELY ARE YOU TO NOD OFF OR FALL ASLEEP WHILE WATCHING TV: 0
HOW LIKELY ARE YOU TO NOD OFF OR FALL ASLEEP WHILE SITTING AND READING: 0
ESS TOTAL SCORE: 0
HOW LIKELY ARE YOU TO NOD OFF OR FALL ASLEEP IN A CAR, WHILE STOPPED FOR A FEW MINUTES IN TRAFFIC: 0
HOW LIKELY ARE YOU TO NOD OFF OR FALL ASLEEP WHILE LYING DOWN TO REST IN THE AFTERNOON WHEN CIRCUMSTANCES PERMIT: 0
HOW LIKELY ARE YOU TO NOD OFF OR FALL ASLEEP WHILE SITTING QUIETLY AFTER LUNCH WITHOUT ALCOHOL: 0
HOW LIKELY ARE YOU TO NOD OFF OR FALL ASLEEP WHILE SITTING INACTIVE IN A PUBLIC PLACE: 0
HOW LIKELY ARE YOU TO NOD OFF OR FALL ASLEEP WHILE SITTING AND TALKING TO SOMEONE: 0

## 2023-05-01 ENCOUNTER — CLINICAL DOCUMENTATION (OUTPATIENT)
Dept: OCCUPATIONAL THERAPY | Age: 63
End: 2023-05-01

## 2023-05-01 NOTE — PROGRESS NOTES
[address] [address]  Phone: [phone]  Fax: [phone]    OUTPATIENT OCCUPATIONAL THERAPY  Discontinuation Summary 5/1/2023  Episode  Appt Desk         Diana Baez has been seen in occupational therapy for 5  visits from 8/15/2022 to 8/29/2022, with 0 cancellations and 0 no shows. Mr. Ana Evans therapy has come to an end at this time due to: Patient did not return for/schedule additional treatment    Physical Therapy Goals:  Not Met: Reasons for goals not being achieved: No further visits were scheduled to reassess.      Ana Crenshaw OT

## 2023-05-22 DIAGNOSIS — D86.9 SARCOID: Primary | ICD-10-CM

## 2023-05-23 NOTE — PROGRESS NOTES
Patient Name:  Lashay Kiran                               YOB: 1960  MRN: [id number]                                                Office Visit 5/24/2023    ASSESSMENT AND PLAN:  (Medical Decision Making)    1. Sarcoidosis  Appears to be inactive. Recommend no treatment at this time we will survey with complete pulmonary function testing in 1 year. He is not hypoxic with exertion and spirometry is normal.  Annually I recommend CMP & CBC & he just had this done in March. Consider urinary calcium/creatinine ratio given presence of kidney stone on last CT and hematuria. 2. Pulmonary embolism without acute cor pulmonale, unspecified chronicity, unspecified pulmonary embolism type (HCC)  Discussed the risks and benefits of anticoagulation. There is some diagnostic uncertainty about whether or not his pulmonary embolism was provoked which makes the risk of recurrence less predictable. He is dissatisfied with the hematuria, which does not qualify as major bleeding, and does not  outweigh benefits of anticoagulation in uprovoked clot, but certainly would if the clot were provoked. We discussed the 30% chance of PE recurrence in the next 5 years if his PE was unprovoked. After discussion, we agree to closely monitor off of anticoagulation with the understanding that recurrent PE would necessitate lifelong anticoagulation and he may need bladder cautery. HAS-BLED score is 3 suggesting 5.8% chance of major bleeding.  - Spirometry Without Bronchodilator      Awilda Burris MD    Total time for encounter on day of encounter was 45 minutes. This time includes chart prep, review of tests/procedures, review of other provider's notes, documentation and counseling patient regarding disease process and medications.      ___________________________________________________________________         ______      REASON FOR VISIT:   Chief Complaint   Patient presents with    Other     Sarcoidosis

## 2023-05-24 ENCOUNTER — OFFICE VISIT (OUTPATIENT)
Dept: PULMONOLOGY | Age: 63
End: 2023-05-24
Payer: COMMERCIAL

## 2023-05-24 ENCOUNTER — HOSPITAL ENCOUNTER (OUTPATIENT)
Dept: GENERAL RADIOLOGY | Age: 63
Discharge: HOME | End: 2023-05-27
Payer: COMMERCIAL

## 2023-05-24 VITALS
RESPIRATION RATE: 15 BRPM | DIASTOLIC BLOOD PRESSURE: 80 MMHG | SYSTOLIC BLOOD PRESSURE: 128 MMHG | HEART RATE: 67 BPM | TEMPERATURE: 97.7 F | BODY MASS INDEX: 38.22 KG/M2 | HEIGHT: 73 IN | WEIGHT: 288.4 LBS | OXYGEN SATURATION: 97 %

## 2023-05-24 DIAGNOSIS — D86.9 SARCOID: ICD-10-CM

## 2023-05-24 DIAGNOSIS — D86.9 SARCOIDOSIS: Primary | ICD-10-CM

## 2023-05-24 DIAGNOSIS — I26.99 PULMONARY EMBOLISM WITHOUT ACUTE COR PULMONALE, UNSPECIFIED CHRONICITY, UNSPECIFIED PULMONARY EMBOLISM TYPE (HCC): ICD-10-CM

## 2023-05-24 LAB
EXPIRATORY TIME: NORMAL
FEF 25-75% %PRED-PRE: NORMAL
FEF 25-75% PRED: NORMAL
FEF 25-75%-PRE: NORMAL
FEV1 %PRED-PRE: 91 %
FEV1 PRED: NORMAL
FEV1/FVC %PRED-PRE: NORMAL
FEV1/FVC PRED: NORMAL
FEV1/FVC: 79 %
FEV1: 3.53 L
FVC %PRED-PRE: 87 %
FVC PRED: NORMAL
FVC: 4.47 L
PEF %PRED-PRE: NORMAL
PEF PRED: NORMAL
PEF-PRE: NORMAL

## 2023-05-24 PROCEDURE — 94010 BREATHING CAPACITY TEST: CPT | Performed by: INTERNAL MEDICINE

## 2023-05-24 PROCEDURE — 99204 OFFICE O/P NEW MOD 45 MIN: CPT | Performed by: INTERNAL MEDICINE

## 2023-05-24 PROCEDURE — 3079F DIAST BP 80-89 MM HG: CPT | Performed by: INTERNAL MEDICINE

## 2023-05-24 PROCEDURE — 3074F SYST BP LT 130 MM HG: CPT | Performed by: INTERNAL MEDICINE

## 2023-05-24 PROCEDURE — 71046 X-RAY EXAM CHEST 2 VIEWS: CPT

## 2023-05-24 ASSESSMENT — PULMONARY FUNCTION TESTS
FEV1_PERCENT_PREDICTED_PRE: 91
FVC_PERCENT_PREDICTED_PRE: 87
FEV1/FVC: 79
FEV1: 3.53
FVC: 4.47

## 2023-06-26 ENCOUNTER — OFFICE VISIT (OUTPATIENT)
Dept: INTERNAL MEDICINE CLINIC | Facility: CLINIC | Age: 63
End: 2023-06-26
Payer: COMMERCIAL

## 2023-06-26 VITALS
OXYGEN SATURATION: 97 % | HEART RATE: 61 BPM | SYSTOLIC BLOOD PRESSURE: 122 MMHG | BODY MASS INDEX: 38.12 KG/M2 | WEIGHT: 287.6 LBS | HEIGHT: 73 IN | TEMPERATURE: 98.1 F | DIASTOLIC BLOOD PRESSURE: 79 MMHG

## 2023-06-26 DIAGNOSIS — M47.816 SPONDYLOSIS WITHOUT MYELOPATHY OR RADICULOPATHY, LUMBAR REGION: ICD-10-CM

## 2023-06-26 DIAGNOSIS — E66.9 OBESITY (BMI 35.0-39.9 WITHOUT COMORBIDITY): Primary | ICD-10-CM

## 2023-06-26 DIAGNOSIS — K21.00 GASTROESOPHAGEAL REFLUX DISEASE WITH ESOPHAGITIS, UNSPECIFIED WHETHER HEMORRHAGE: ICD-10-CM

## 2023-06-26 DIAGNOSIS — E66.9 OBESITY (BMI 30-39.9): ICD-10-CM

## 2023-06-26 DIAGNOSIS — E78.00 PURE HYPERCHOLESTEROLEMIA, UNSPECIFIED: ICD-10-CM

## 2023-06-26 DIAGNOSIS — I10 PRIMARY HYPERTENSION: ICD-10-CM

## 2023-06-26 PROCEDURE — 3078F DIAST BP <80 MM HG: CPT | Performed by: INTERNAL MEDICINE

## 2023-06-26 PROCEDURE — 99214 OFFICE O/P EST MOD 30 MIN: CPT | Performed by: INTERNAL MEDICINE

## 2023-06-26 PROCEDURE — 3074F SYST BP LT 130 MM HG: CPT | Performed by: INTERNAL MEDICINE

## 2023-06-26 RX ORDER — LOSARTAN POTASSIUM 25 MG/1
TABLET ORAL
Qty: 90 TABLET | Refills: 1 | Status: SHIPPED | OUTPATIENT
Start: 2023-06-26

## 2023-06-26 RX ORDER — ESOMEPRAZOLE MAGNESIUM 40 MG/1
CAPSULE, DELAYED RELEASE ORAL
Qty: 90 CAPSULE | Refills: 1 | Status: SHIPPED | OUTPATIENT
Start: 2023-06-26

## 2023-06-26 RX ORDER — ATORVASTATIN CALCIUM 40 MG/1
TABLET, FILM COATED ORAL
Qty: 90 TABLET | Refills: 1 | Status: SHIPPED | OUTPATIENT
Start: 2023-06-26

## 2023-06-26 ASSESSMENT — ANXIETY QUESTIONNAIRES
7. FEELING AFRAID AS IF SOMETHING AWFUL MIGHT HAPPEN: 0
5. BEING SO RESTLESS THAT IT IS HARD TO SIT STILL: 0
GAD7 TOTAL SCORE: 0
6. BECOMING EASILY ANNOYED OR IRRITABLE: 0
2. NOT BEING ABLE TO STOP OR CONTROL WORRYING: 0
1. FEELING NERVOUS, ANXIOUS, OR ON EDGE: 0
IF YOU CHECKED OFF ANY PROBLEMS ON THIS QUESTIONNAIRE, HOW DIFFICULT HAVE THESE PROBLEMS MADE IT FOR YOU TO DO YOUR WORK, TAKE CARE OF THINGS AT HOME, OR GET ALONG WITH OTHER PEOPLE: NOT DIFFICULT AT ALL
3. WORRYING TOO MUCH ABOUT DIFFERENT THINGS: 0
4. TROUBLE RELAXING: 0

## 2023-06-27 LAB
ANION GAP SERPL CALC-SCNC: 4 MMOL/L (ref 2–11)
BUN SERPL-MCNC: 13 MG/DL (ref 8–23)
CALCIUM SERPL-MCNC: 9.4 MG/DL (ref 8.3–10.4)
CHLORIDE SERPL-SCNC: 108 MMOL/L (ref 101–110)
CHOLEST SERPL-MCNC: 174 MG/DL
CO2 SERPL-SCNC: 27 MMOL/L (ref 21–32)
CREAT SERPL-MCNC: 1 MG/DL (ref 0.8–1.5)
EST. AVERAGE GLUCOSE BLD GHB EST-MCNC: 128 MG/DL
GLUCOSE SERPL-MCNC: 109 MG/DL (ref 65–100)
HBA1C MFR BLD: 6.1 % (ref 4.8–5.6)
HDLC SERPL-MCNC: 70 MG/DL (ref 40–60)
HDLC SERPL: 2.5
LDLC SERPL CALC-MCNC: 85 MG/DL
POTASSIUM SERPL-SCNC: 4.7 MMOL/L (ref 3.5–5.1)
SODIUM SERPL-SCNC: 139 MMOL/L (ref 133–143)
TRIGL SERPL-MCNC: 95 MG/DL (ref 35–150)
VLDLC SERPL CALC-MCNC: 19 MG/DL (ref 6–23)

## 2023-08-22 ENCOUNTER — PATIENT MESSAGE (OUTPATIENT)
Dept: INTERNAL MEDICINE CLINIC | Facility: CLINIC | Age: 63
End: 2023-08-22

## 2023-08-22 DIAGNOSIS — D86.9 SARCOIDOSIS: Primary | ICD-10-CM

## 2023-08-23 ENCOUNTER — TELEPHONE (OUTPATIENT)
Dept: INTERNAL MEDICINE CLINIC | Facility: CLINIC | Age: 63
End: 2023-08-23

## 2023-08-23 DIAGNOSIS — D86.9 SARCOIDOSIS: Primary | ICD-10-CM

## 2023-08-23 NOTE — TELEPHONE ENCOUNTER
----- Message from Chase Severance. Briana sent at 8/23/2023  9:42 AM EDT -----  Regarding: Eye problems  Contact: [phone]  [facility] does not do the muscle surgery similar to what I had done in 2019. They recommended [facility]. Can I get a referral there?

## 2023-08-23 NOTE — TELEPHONE ENCOUNTER
----- Message from Noy Warren sent at 8/23/2023  9:42 AM EDT -----  Regarding: Eye problems  Contact: [phone]  [facility] does not do the muscle surgery similar to what I had done in 2019. They recommended [facility]. Can I get a referral there?

## 2023-12-17 NOTE — TELEPHONE ENCOUNTER
Recall Colonoscopy  of Julián Brunner M.D.  I have called the patient this afternoon, the patient is aware

## 2024-01-03 ENCOUNTER — OFFICE VISIT (OUTPATIENT)
Dept: INTERNAL MEDICINE CLINIC | Facility: CLINIC | Age: 64
End: 2024-01-03
Payer: COMMERCIAL

## 2024-01-03 VITALS
TEMPERATURE: 98.1 F | SYSTOLIC BLOOD PRESSURE: 140 MMHG | BODY MASS INDEX: 38.38 KG/M2 | HEIGHT: 73 IN | HEART RATE: 68 BPM | DIASTOLIC BLOOD PRESSURE: 83 MMHG | WEIGHT: 289.6 LBS | OXYGEN SATURATION: 96 %

## 2024-01-03 DIAGNOSIS — G56.03 BILATERAL CARPAL TUNNEL SYNDROME: ICD-10-CM

## 2024-01-03 DIAGNOSIS — E78.00 PURE HYPERCHOLESTEROLEMIA, UNSPECIFIED: ICD-10-CM

## 2024-01-03 DIAGNOSIS — C61 PROSTATE CANCER (HCC): ICD-10-CM

## 2024-01-03 DIAGNOSIS — M79.641 BILATERAL HAND PAIN: ICD-10-CM

## 2024-01-03 DIAGNOSIS — E66.01 CLASS 2 SEVERE OBESITY DUE TO EXCESS CALORIES WITH SERIOUS COMORBIDITY AND BODY MASS INDEX (BMI) OF 38.0 TO 38.9 IN ADULT (HCC): ICD-10-CM

## 2024-01-03 DIAGNOSIS — R73.03 PREDIABETES: ICD-10-CM

## 2024-01-03 DIAGNOSIS — E66.9 OBESITY (BMI 30-39.9): ICD-10-CM

## 2024-01-03 DIAGNOSIS — D86.9 SARCOIDOSIS: ICD-10-CM

## 2024-01-03 DIAGNOSIS — M79.642 BILATERAL HAND PAIN: ICD-10-CM

## 2024-01-03 DIAGNOSIS — K21.00 GASTROESOPHAGEAL REFLUX DISEASE WITH ESOPHAGITIS, UNSPECIFIED WHETHER HEMORRHAGE: ICD-10-CM

## 2024-01-03 DIAGNOSIS — I10 PRIMARY HYPERTENSION: ICD-10-CM

## 2024-01-03 DIAGNOSIS — G62.9 PERIPHERAL POLYNEUROPATHY: Primary | ICD-10-CM

## 2024-01-03 PROBLEM — E66.812 CLASS 2 SEVERE OBESITY DUE TO EXCESS CALORIES WITH SERIOUS COMORBIDITY AND BODY MASS INDEX (BMI) OF 38.0 TO 38.9 IN ADULT: Status: ACTIVE | Noted: 2024-01-03

## 2024-01-03 LAB
ERYTHROCYTE [DISTWIDTH] IN BLOOD BY AUTOMATED COUNT: 13.1 % (ref 11.9–14.6)
HCT VFR BLD AUTO: 43.2 % (ref 41.1–50.3)
HGB BLD-MCNC: 14.1 G/DL (ref 13.6–17.2)
MCH RBC QN AUTO: 29.7 PG (ref 26.1–32.9)
MCHC RBC AUTO-ENTMCNC: 32.6 G/DL (ref 31.4–35)
MCV RBC AUTO: 91.1 FL (ref 82–102)
NRBC # BLD: 0 K/UL (ref 0–0.2)
PLATELET # BLD AUTO: 248 K/UL (ref 150–450)
PMV BLD AUTO: 10.4 FL (ref 9.4–12.3)
RBC # BLD AUTO: 4.74 M/UL (ref 4.23–5.6)
WBC # BLD AUTO: 7.6 K/UL (ref 4.3–11.1)

## 2024-01-03 PROCEDURE — 99214 OFFICE O/P EST MOD 30 MIN: CPT | Performed by: INTERNAL MEDICINE

## 2024-01-03 PROCEDURE — 1123F ACP DISCUSS/DSCN MKR DOCD: CPT | Performed by: INTERNAL MEDICINE

## 2024-01-03 PROCEDURE — 3079F DIAST BP 80-89 MM HG: CPT | Performed by: INTERNAL MEDICINE

## 2024-01-03 PROCEDURE — 3077F SYST BP >= 140 MM HG: CPT | Performed by: INTERNAL MEDICINE

## 2024-01-03 RX ORDER — ATORVASTATIN CALCIUM 40 MG/1
TABLET, FILM COATED ORAL
Qty: 90 TABLET | Refills: 1 | Status: SHIPPED | OUTPATIENT
Start: 2024-01-03

## 2024-01-03 RX ORDER — ESOMEPRAZOLE MAGNESIUM 40 MG/1
CAPSULE, DELAYED RELEASE ORAL
Qty: 90 CAPSULE | Refills: 1 | Status: SHIPPED | OUTPATIENT
Start: 2024-01-03

## 2024-01-03 RX ORDER — MELOXICAM 7.5 MG/1
7.5 TABLET ORAL DAILY
Qty: 30 TABLET | Refills: 3 | Status: SHIPPED | OUTPATIENT
Start: 2024-01-03

## 2024-01-03 ASSESSMENT — PATIENT HEALTH QUESTIONNAIRE - PHQ9
1. LITTLE INTEREST OR PLEASURE IN DOING THINGS: 0
SUM OF ALL RESPONSES TO PHQ QUESTIONS 1-9: 0
2. FEELING DOWN, DEPRESSED OR HOPELESS: 0
SUM OF ALL RESPONSES TO PHQ QUESTIONS 1-9: 0
SUM OF ALL RESPONSES TO PHQ9 QUESTIONS 1 & 2: 0

## 2024-01-03 NOTE — PROGRESS NOTES
Peter Warren (: 1960) is a 63 y.o. male, here for evaluation of the following chief complaint(s):  Hypertension and Other (Pain management )       ASSESSMENT/PLAN:  1. Peripheral polyneuropathy  2. Pure hypercholesterolemia, unspecified  -     atorvastatin (LIPITOR) 40 MG tablet; TAKE 1 TABLET BY MOUTH EVERY DAY, Disp-90 tablet, R-1Normal  -     Lipid Panel; Future  3. Gastroesophageal reflux disease with esophagitis, unspecified whether hemorrhage  -     esomeprazole (NEXIUM) 40 MG delayed release capsule; TAKE 1 CAPSULE BY MOUTH EVERY DAY, Disp-90 capsule, R-1Normal  4. Obesity (BMI 30-39.9)  5. Primary hypertension  6. Bilateral carpal tunnel syndrome  7. Prostate cancer (HCC)  -     CBC; Future  -     Basic Metabolic Panel; Future  -     PSA, ultrasensitive; Future  8. Sarcoidosis  -     CBC; Future  -     Basic Metabolic Panel; Future  9. Class 2 severe obesity due to excess calories with serious comorbidity and body mass index (BMI) of 38.0 to 38.9 in adult (formerly [facility])  Comments:  Tolerant of semaglutide  10. Bilateral hand pain  -     meloxicam (MOBIC) 7.5 MG tablet; Take 1 tablet by mouth daily, Disp-30 tablet, R-3Normal  11. Prediabetes  -     Hemoglobin A1C; Future             SUBJECTIVE/OBJECTIVE:  HPI: Patient is a very pleasant 63-year-old male with medical history significant for sarcoidosis, pulmonary embolism, hypertension, hyperlipidemia, prediabetes, and obesity presenting for routine follow-up visit.  Patient previously with hypogonadism secondary to prostate cancer treatment and has not received testosterone prescription.  Will provide at this time.  Patient's blood pressure slightly elevated in office today at 140 systolic.  Patient is not taking his losartan.  Patient with interest in discussion of pain management.  Normal are taking Eliquis after discussion with pulmonology.  Bilateral hand pain has worsened since home-improvement project has progressed.  Patient longer using

## 2024-01-04 LAB
ANION GAP SERPL CALC-SCNC: 6 MMOL/L (ref 2–11)
BUN SERPL-MCNC: 17 MG/DL (ref 8–23)
CALCIUM SERPL-MCNC: 9.6 MG/DL (ref 8.3–10.4)
CHLORIDE SERPL-SCNC: 108 MMOL/L (ref 103–113)
CHOLEST SERPL-MCNC: 209 MG/DL
CO2 SERPL-SCNC: 25 MMOL/L (ref 21–32)
CREAT SERPL-MCNC: 0.9 MG/DL (ref 0.8–1.5)
EST. AVERAGE GLUCOSE BLD GHB EST-MCNC: 128 MG/DL
GLUCOSE SERPL-MCNC: 128 MG/DL (ref 65–100)
HBA1C MFR BLD: 6.1 % (ref 4.8–5.6)
HDLC SERPL-MCNC: 65 MG/DL (ref 40–60)
HDLC SERPL: 3.2
LDLC SERPL CALC-MCNC: 126.6 MG/DL
POTASSIUM SERPL-SCNC: 4.6 MMOL/L (ref 3.5–5.1)
SODIUM SERPL-SCNC: 139 MMOL/L (ref 136–146)
TRIGL SERPL-MCNC: 87 MG/DL (ref 35–150)
VLDLC SERPL CALC-MCNC: 17.4 MG/DL (ref 6–23)

## 2024-01-05 LAB — PSA SERPL DL<=0.01 NG/ML-MCNC: <0.006 NG/ML (ref 0–4)

## 2024-02-27 ASSESSMENT — ENCOUNTER SYMPTOMS
SKIN LESIONS: 0
DIARRHEA: 0
CONSTIPATION: 0
BACK PAIN: 0
EYE PAIN: 0
BLOOD IN STOOL: 0
ABDOMINAL PAIN: 0
HEARTBURN: 0
SHORTNESS OF BREATH: 0
EYE DISCHARGE: 0
INDIGESTION: 0
NAUSEA: 0
WHEEZING: 0
COUGH: 0
VOMITING: 0

## 2024-02-27 NOTE — PROGRESS NOTES
file   Social History Narrative    Not on file     Social Determinants of Health     Financial Resource Strain: Low Risk  (2/27/2023)    Overall Financial Resource Strain (CARDIA)     Difficulty of Paying Living Expenses: Not very hard   Food Insecurity: Not on file (2/27/2023)   Transportation Needs: Unknown (2/27/2023)    PRAPARE - Transportation     Lack of Transportation (Medical): Not on file     Lack of Transportation (Non-Medical): No   Physical Activity: Not on file   Stress: Not on file   Social Connections: Not on file   Intimate Partner Violence: Not on file   Housing Stability: Unknown (2/27/2023)    Housing Stability Vital Sign     Unable to Pay for Housing in the Last Year: Not on file     Number of Places Lived in the Last Year: Not on file     Unstable Housing in the Last Year: No     Family History   Problem Relation Age of Onset    Stroke Mother         Mini    High Cholesterol Father     Diabetes Father     Stroke Father        Review of Systems  Constitutional:   Negative for fever, chills, appetite change, malaise/fatigue, headaches and weight loss.  Skin:  Negative for skin lesions, rash and itching.  Eyes:  Negative for visual disturbance, eye pain and eye discharge.  ENT:  Negative for difficulty articulating words, pain swallowing, high frequency hearing loss and dry mouth.  Respiratory:  Negative for cough, blood in sputum, shortness of breath and wheezing.  Cardiovascular:  Negative for chest pain, hypertension, irregular heartbeat, leg pain, leg swelling, regular rate and rhythm and varicose veins.  GI:  Negative for nausea, vomiting, abdominal pain, blood in stool, constipation, diarrhea, indigestion and heartburn.  Genitourinary: Positive for hematuria and erectile dysfunction. Negative for urinary burning, flank pain, recurrent UTIs, history of urolithiasis, nocturia, slower stream, straining, urgency, leakage w/ urge, frequent urination, incomplete emptying, testicular pain, sexually

## 2024-02-28 ENCOUNTER — OFFICE VISIT (OUTPATIENT)
Dept: UROLOGY | Age: 64
End: 2024-02-28
Payer: COMMERCIAL

## 2024-02-28 DIAGNOSIS — R31.0 GROSS HEMATURIA: ICD-10-CM

## 2024-02-28 DIAGNOSIS — Z85.46 HISTORY OF PROSTATE CANCER: Primary | ICD-10-CM

## 2024-02-28 DIAGNOSIS — N39.3 MALE URINARY STRESS INCONTINENCE: ICD-10-CM

## 2024-02-28 DIAGNOSIS — Z87.442 HISTORY OF KIDNEY STONES: ICD-10-CM

## 2024-02-28 DIAGNOSIS — N30.40 RADIATION CYSTITIS: ICD-10-CM

## 2024-02-28 DIAGNOSIS — N52.01 ERECTILE DYSFUNCTION DUE TO ARTERIAL INSUFFICIENCY: ICD-10-CM

## 2024-02-28 DIAGNOSIS — N39.3 PRIMARY STRESS URINARY INCONTINENCE: ICD-10-CM

## 2024-02-28 LAB
BILIRUBIN, URINE, POC: NEGATIVE
BLOOD URINE, POC: NEGATIVE
GLUCOSE URINE, POC: NEGATIVE
KETONES, URINE, POC: NEGATIVE
LEUKOCYTE ESTERASE, URINE, POC: NEGATIVE
NITRITE, URINE, POC: NEGATIVE
PH, URINE, POC: 6 (ref 4.6–8)
PROTEIN,URINE, POC: NEGATIVE
SPECIFIC GRAVITY, URINE, POC: 1.03 (ref 1–1.03)
URINALYSIS CLARITY, POC: NORMAL
URINALYSIS COLOR, POC: NORMAL
UROBILINOGEN, POC: NORMAL

## 2024-02-28 PROCEDURE — 81003 URINALYSIS AUTO W/O SCOPE: CPT | Performed by: UROLOGY

## 2024-02-28 PROCEDURE — 1123F ACP DISCUSS/DSCN MKR DOCD: CPT | Performed by: UROLOGY

## 2024-02-28 PROCEDURE — 99214 OFFICE O/P EST MOD 30 MIN: CPT | Performed by: UROLOGY

## 2024-03-22 NOTE — PROGRESS NOTES
[facility]  [address]  [place], [place] [address] [phone]    Patient Name:  Peter Warren  YOB: 1960    Peter Warren, was evaluated through a synchronous (real-time) audio-video encounter. The patient (or guardian if applicable) is aware that this is a billable service, which includes applicable co-pays. This Virtual Visit was conducted with patient's (and/or legal guardian's) consent. Patient identification was verified, and a caregiver was present when appropriate.   The patient was located at Home: [address] [address]  [facility] [address]  Provider was located at Facility (Appt Dept): [address]  [place] [address]      Office Visit 3/25/2024    CHIEF COMPLAINT:    Chief Complaint   Patient presents with    Sleep Apnea    CPAP/BiPAP       HISTORY OF PRESENT ILLNESS:  Patient is being seen today via virtual visit.  Patient sleep study in 2015 revealed overall AHI of 9.4 with lowest desaturation 82%. He is prescribed cpap therapy with a humidifier set at 8-18cm with a full face mask. Most recent download reveals AHI on PAP therapy is 0.4, leak is 23.1 and the hourly usage is 8 hours 55 minutes nightly. The overall use is 3254 hours with days greater than four hours at 365/365. The patient is compliant with the Pap therapy and is feeling better as a result.     Patient states he is rested in the morning, denies any daytime fatigue or napping.  Current [place] score 0/24.  He states he is usually falling asleep easily and denies any frequent nocturnal awakenings.  He does state that he has a dog that will occasionally awaken him at night.  He denies any issues with the CPAP or the pressure.  He denies any hypertension issues and in fact has been off of blood pressure medications for several months, denies any lower extremity edema.  He denies any changes to medical history, states weight is down 15 pounds.           Past

## 2024-03-25 ENCOUNTER — TELEMEDICINE (OUTPATIENT)
Dept: SLEEP MEDICINE | Age: 64
End: 2024-03-25
Payer: COMMERCIAL

## 2024-03-25 DIAGNOSIS — G47.33 OBSTRUCTIVE SLEEP APNEA (ADULT) (PEDIATRIC): Primary | ICD-10-CM

## 2024-03-25 PROCEDURE — 99213 OFFICE O/P EST LOW 20 MIN: CPT | Performed by: STUDENT IN AN ORGANIZED HEALTH CARE EDUCATION/TRAINING PROGRAM

## 2024-03-25 PROCEDURE — 1123F ACP DISCUSS/DSCN MKR DOCD: CPT | Performed by: STUDENT IN AN ORGANIZED HEALTH CARE EDUCATION/TRAINING PROGRAM

## 2024-03-25 ASSESSMENT — SLEEP AND FATIGUE QUESTIONNAIRES
HOW LIKELY ARE YOU TO NOD OFF OR FALL ASLEEP WHEN YOU ARE A PASSENGER IN A CAR FOR AN HOUR WITHOUT A BREAK: WOULD NEVER DOZE
ESS TOTAL SCORE: 0
HOW LIKELY ARE YOU TO NOD OFF OR FALL ASLEEP WHILE SITTING INACTIVE IN A PUBLIC PLACE: WOULD NEVER DOZE
HOW LIKELY ARE YOU TO NOD OFF OR FALL ASLEEP WHILE WATCHING TV: WOULD NEVER DOZE
HOW LIKELY ARE YOU TO NOD OFF OR FALL ASLEEP WHILE SITTING AND TALKING TO SOMEONE: WOULD NEVER DOZE
HOW LIKELY ARE YOU TO NOD OFF OR FALL ASLEEP WHILE SITTING AND READING: WOULD NEVER DOZE
HOW LIKELY ARE YOU TO NOD OFF OR FALL ASLEEP WHILE LYING DOWN TO REST IN THE AFTERNOON WHEN CIRCUMSTANCES PERMIT: WOULD NEVER DOZE
HOW LIKELY ARE YOU TO NOD OFF OR FALL ASLEEP WHILE SITTING QUIETLY AFTER LUNCH WITHOUT ALCOHOL: WOULD NEVER DOZE
HOW LIKELY ARE YOU TO NOD OFF OR FALL ASLEEP IN A CAR, WHILE STOPPED FOR A FEW MINUTES IN TRAFFIC: WOULD NEVER DOZE

## 2024-04-15 NOTE — THERAPY EVALUATION
good   Baseline b/t contract: [] poor     [] fair     [] good   Overflow: [] absent     [x] min     [] mod     [] Severe: core        ASSESSMENT   Initial Assessment: Peter Warren presents decreased coordination, strength and endurance of pelvic floor muscle pre operatively. Today he was educated on bladder health, kegel exercises, pelvic floor anatomy and role of musculature and precautions with catheter post operatively. He required mod cuing for proper activation and isolation of pelvic floor muscles. He was able to contract muscle for 5 seconds without breath holding and accessory muscle use of core.     Therapy Problem List: (Impacting functional limitations):    Decreased Strength, Decreased ROM, Decreased Functional Mobility, Decreased [place] with Home Exercise Program, Decreased Posture, Decreased Body Mechanics, and Decreased Activity Tolerance/Endurance*   Therapy Prognosis:   Good     Initial Assessment Complexity:   Low Complexity     PLAN   Effective Dates: 4/17/2024 TO Plan of Care/Certification Expiration Date: 07/17/24     Frequency/Duration: Plan Frequency: 1x/week for 90 days     Interventions Planned (Treatment may consist of any combination of the following):    Home Exercise Program (HEP), Therapeutic Activites, and Therapeutic Exercise/Strengthening     Goals: (Goals have been discussed and agreed upon with patient.)  Short-Term Functional Goals: Time Frame: 4 weeks  Patient will demonstrate I with basic PFM HEP to improve awareness, coordination, and timing of PFM.  Patient will verbalize an understanding of pelvic anatomy and causes of post op incontinence.  Patient will demonstrate understanding of and ability to teach back appropriate water intake, bladder irritants, toileting frequency, and positioning for improved self-management of symptoms.        Discharge Goals:   DISCHARGE GOALS TO BE DETERMINED POST OPERATIVELY  Pt will demonstrate appropriate co-contraction of the

## 2024-04-15 NOTE — PROGRESS NOTES
Peter Gaming Briana  : 1960  Primary: Medicare Part A (Commercial)  Secondary: [facility] @ [address]  [facility] [address]  Phone: [phone]  Fax: [phone] Plan Frequency: 1x/week for 90 days  Plan of Care/Certification Expiration Date: 24        Plan of Care/Certification Expiration Date:  Plan of Care/Certification Expiration Date: 24    Frequency/Duration: Plan Frequency: 1x/week for 90 days      Time In/Out:   Time In: 0801  Time Out: 0853      PT Visit Info:    Progress Note Due Date: 24      Visit Count:  1    OUTPATIENT PHYSICAL THERAPY:   Treatment Note 2024       Episode  (PFPT)               Treatment Diagnosis:    Lack of coordination  Mixed incontinence  Medical/Referring Diagnosis:    Male urinary stress incontinence    Referring Physician:  Gold Kelly MD MD Orders:  PT Eval and Treat   Return MD Appt:     Date of Onset:  No data recorded   Allergies:   Clavulanic acid and Amoxicillin  Restrictions/Precautions:   None      Interventions Planned (Treatment may consist of any combination of the following):     See Assessment Note    Subjective Comments:     Initial Pain Level::     0/10  Post Session Pain Level:       0/10  Medications Last Reviewed:  2024  Updated Objective Findings:  See Evaluation Note from today  Treatment   THERAPEUTIC ACTIVITY: ( 25 minutes): Functional activity education regarding anatomy, pathology and role of pelvic floor muscle (PFM) function in relation to presenting symptoms and role of pelvic floor therapy in conservative treatment. and Instruction on coordinated pelvic floor and diaphragmatic breathing to improve kinesthetic awareness of pelvic muscle mobility and restore proper motor recruitment patterns with breathing, posture, and functional movement (e.g. appropriate lift/contraction with increased IAP such as a cough, laugh, sneeze to prevent incontinence as well

## 2024-04-17 ENCOUNTER — HOSPITAL ENCOUNTER (OUTPATIENT)
Dept: PHYSICAL THERAPY | Age: 64
Setting detail: RECURRING SERIES
Discharge: HOME | End: 2024-04-20
Attending: UROLOGY
Payer: MEDICARE

## 2024-04-17 DIAGNOSIS — R27.9 LACK OF COORDINATION: Primary | ICD-10-CM

## 2024-04-17 DIAGNOSIS — N39.46 MIXED INCONTINENCE: ICD-10-CM

## 2024-04-17 PROCEDURE — 97161 PT EVAL LOW COMPLEX 20 MIN: CPT

## 2024-04-17 PROCEDURE — 97530 THERAPEUTIC ACTIVITIES: CPT

## 2024-04-17 ASSESSMENT — PAIN SCALES - GENERAL: PAINLEVEL_OUTOF10: 0

## 2024-04-22 ENCOUNTER — HOSPITAL ENCOUNTER (OUTPATIENT)
Dept: PHYSICAL THERAPY | Age: 64
Setting detail: RECURRING SERIES
Discharge: HOME | End: 2024-04-25
Attending: UROLOGY
Payer: MEDICARE

## 2024-04-22 PROCEDURE — 97110 THERAPEUTIC EXERCISES: CPT

## 2024-04-22 ASSESSMENT — PAIN SCALES - GENERAL: PAINLEVEL_OUTOF10: 0

## 2024-04-22 NOTE — PROGRESS NOTES
Peter Warren  : 1960  Primary: Medicare Part A (Commercial)  Secondary: [facility] @ [address]  [facility] [address]  Phone: [phone]  Fax: [phone] Plan Frequency: 1x/week for 90 days  Plan of Care/Certification Expiration Date: 24        Plan of Care/Certification Expiration Date:  Plan of Care/Certification Expiration Date: 24    Frequency/Duration: Plan Frequency: 1x/week for 90 days      Time In/Out:   Time In: 09  Time Out: 09      PT Visit Info:    Progress Note Due Date: 24      Visit Count:  2    OUTPATIENT PHYSICAL THERAPY:   Treatment Note 2024       Episode  (PFPT)               Treatment Diagnosis:    No data found  Lack of coordination  Mixed incontinence  Contributing Diagnosis:  Pelvic floor dysfunction in female (M62.98) and Urgency of urination (R39.15)  Medical/Referring Diagnosis:    Male urinary stress incontinence    Referring Physician:  Gold Kelly MD MD Orders:  PT Eval and Treat   Return MD Appt:     Date of Onset:  No data recorded   Allergies:   Clavulanic acid and Amoxicillin  Restrictions/Precautions:   None      Interventions Planned (Treatment may consist of any combination of the following):     See Assessment Note    Subjective Comments:   Patient had surgery and radiation for his prostate in . He developed bleeding in his bladder from radiation, got off of blood thinners and this stopped. This was last summer.   Patient reports that he has incontinence with lifting heavy weights, sit to stands and coughing sneezing.      Occasional UUI. He tends to try to limit fluids if he is driving. When he has incontinence he will urinate right after and reports his bladder is not full.   He is urinating more frequently in the morning after having tea and coffee.   He is not typically waking up at night to urinate.   When he urinates he does not feel it. He has no sensation with

## 2024-05-01 ENCOUNTER — TELEPHONE (OUTPATIENT)
Dept: INTERNAL MEDICINE CLINIC | Facility: CLINIC | Age: 64
End: 2024-05-01

## 2024-05-01 RX ORDER — PANTOPRAZOLE SODIUM 40 MG/1
40 TABLET, DELAYED RELEASE ORAL DAILY
COMMUNITY

## 2024-05-01 NOTE — TELEPHONE ENCOUNTER
Received fax from pt's insurance [employer] stating that Nexium 40 mg is no longer in their formulary and have requested for alternative therapy as follows:    Omprazole 40 mg 1 qd  Patoprazole 40 mg 1 qd  Lasoprazole 30 mg 1 qd      Per Dr. Santos will change to Pantoprazole 40 mg. Form has been signed and faxed to [phone] and [phone]

## 2024-05-02 ENCOUNTER — HOSPITAL ENCOUNTER (OUTPATIENT)
Dept: PHYSICAL THERAPY | Age: 64
Setting detail: RECURRING SERIES
Discharge: HOME | End: 2024-05-05
Attending: UROLOGY
Payer: COMMERCIAL

## 2024-05-02 PROCEDURE — 97110 THERAPEUTIC EXERCISES: CPT

## 2024-05-02 ASSESSMENT — PAIN SCALES - GENERAL: PAINLEVEL_OUTOF10: 0

## 2024-05-02 NOTE — PROGRESS NOTES
Peter Warren  : 1960  Primary: Umr (Commercial)  Secondary:  [facility] @ [address]  [facility] [address]  Phone: [phone]  Fax: [phone] Plan Frequency: 1x/week for 90 days  Plan of Care/Certification Expiration Date: 24        Plan of Care/Certification Expiration Date:  Plan of Care/Certification Expiration Date: 24    Frequency/Duration: Plan Frequency: 1x/week for 90 days      Time In/Out:   Time In: 0900  Time Out: 0947      PT Visit Info:    Progress Note Due Date: 24      Visit Count:  3    OUTPATIENT PHYSICAL THERAPY:   Treatment Note 2024       Episode  (PFPT)               Treatment Diagnosis:    No data found  Lack of coordination  Mixed incontinence  Contributing Diagnosis:  Pelvic floor dysfunction in female (M62.98) and Urgency of urination (R39.15)  Medical/Referring Diagnosis:    Male urinary stress incontinence    Referring Physician:  Gold Kelly MD MD Orders:  PT Eval and Treat   Return MD Appt:     Date of Onset:  No data recorded   Allergies:   Clavulanic acid and Amoxicillin  Restrictions/Precautions:   None      Interventions Planned (Treatment may consist of any combination of the following):     See Assessment Note    Subjective Comments:   Patient had surgery and radiation for his prostate in . He developed bleeding in his bladder from radiation, got off of blood thinners and this stopped. This was last summer.   Patient reports that he has incontinence with lifting heavy weights, sit to stands and coughing sneezing.      Occasional UUI. He tends to try to limit fluids if he is driving. When he has incontinence he will urinate right after and reports his bladder is not full.   He is urinating more frequently in the morning after having tea and coffee.   He is not typically waking up at night to urinate.   When he urinates he does not feel it. He has no sensation with this. He does

## 2024-05-06 ENCOUNTER — HOSPITAL ENCOUNTER (OUTPATIENT)
Dept: PHYSICAL THERAPY | Age: 64
Setting detail: RECURRING SERIES
Discharge: HOME | End: 2024-05-09
Attending: UROLOGY
Payer: COMMERCIAL

## 2024-05-06 PROCEDURE — 97110 THERAPEUTIC EXERCISES: CPT

## 2024-05-06 ASSESSMENT — PAIN SCALES - GENERAL: PAINLEVEL_OUTOF10: 0

## 2024-05-06 NOTE — PROGRESS NOTES
Peter Warren  : 1960  Primary: Umr (Commercial)  Secondary:  [facility] @ [address]  [facility] [address]  Phone: [phone]  Fax: [phone] Plan Frequency: 1x/week for 90 days  Plan of Care/Certification Expiration Date: 24        Plan of Care/Certification Expiration Date:  Plan of Care/Certification Expiration Date: 24    Frequency/Duration: Plan Frequency: 1x/week for 90 days      Time In/Out:   Time In: 09  Time Out: 0950      PT Visit Info:    Progress Note Due Date: 24      Visit Count:  4    OUTPATIENT PHYSICAL THERAPY:   Treatment Note 2024       Episode  (PFPT)               Treatment Diagnosis:    No data found  Lack of coordination  Mixed incontinence  Contributing Diagnosis:  Pelvic floor dysfunction in female (M62.98) and Urgency of urination (R39.15)  Medical/Referring Diagnosis:    Male urinary stress incontinence    Referring Physician:  Gold Kelly MD MD Orders:  PT Eval and Treat   Return MD Appt:     Date of Onset:  No data recorded   Allergies:   Clavulanic acid and Amoxicillin  Restrictions/Precautions:   None      Interventions Planned (Treatment may consist of any combination of the following):     See Assessment Note    Subjective Comments:   Patient had surgery and radiation for his prostate in . He developed bleeding in his bladder from radiation, got off of blood thinners and this stopped. This was last summer.   Patient reports that he has incontinence with lifting heavy weights, sit to stands and coughing sneezing.      Occasional UUI. He tends to try to limit fluids if he is driving. When he has incontinence he will urinate right after and reports his bladder is not full.   He is urinating more frequently in the morning after having tea and coffee.   He is not typically waking up at night to urinate.   When he urinates he does not feel it. He has no sensation with this. He does

## 2024-05-13 ENCOUNTER — HOSPITAL ENCOUNTER (OUTPATIENT)
Dept: PHYSICAL THERAPY | Age: 64
Setting detail: RECURRING SERIES
End: 2024-05-13
Attending: UROLOGY
Payer: COMMERCIAL

## 2024-05-13 NOTE — PROGRESS NOTES
Peter Warren  : 1960  Primary: Umr  Secondary:  [facility] @ [address]  [facility] [address]  Phone: [phone]  Fax: [phone]    PT Visit Info:    Progress Note Due Date: 24     OT Visit Info:  No data recorded    OUTPATIENT THERAPY: 2024  Episode  Appt Desk        Peter Warren cancelled his appointment for today due to reasons not given.  Will plan to follow up next during next appointment.  Thank you,  Breanna Whelan, PT    Future Appointments   Date Time Provider Department Center   2024  7:00 PM Breanna Whelan, PT SFEORPT SFE   7/3/2024  8:00 AM Osbaldo Santos DO GVLW [facility] AMB   2024  8:00 AM Davy Flores DO UCDG [facility] AMB   2025 10:15 AM [id number] BLOOD DRAW GLE248 [facility] AMB   2025  3:00 PM Gold Kelly MD [id number] [facility] AMB

## 2024-07-06 DIAGNOSIS — E78.00 PURE HYPERCHOLESTEROLEMIA, UNSPECIFIED: ICD-10-CM

## 2024-07-08 RX ORDER — ATORVASTATIN CALCIUM 40 MG/1
TABLET, FILM COATED ORAL
Qty: 30 TABLET | Refills: 0 | Status: SHIPPED | OUTPATIENT
Start: 2024-07-08

## 2024-07-10 DIAGNOSIS — M79.641 BILATERAL HAND PAIN: ICD-10-CM

## 2024-07-10 DIAGNOSIS — E78.00 PURE HYPERCHOLESTEROLEMIA, UNSPECIFIED: ICD-10-CM

## 2024-07-10 DIAGNOSIS — M79.642 BILATERAL HAND PAIN: ICD-10-CM

## 2024-07-10 RX ORDER — ATORVASTATIN CALCIUM 40 MG/1
40 TABLET, FILM COATED ORAL DAILY
Qty: 90 TABLET | Refills: 0 | OUTPATIENT
Start: 2024-07-10

## 2024-07-10 RX ORDER — PANTOPRAZOLE SODIUM 40 MG/1
40 TABLET, DELAYED RELEASE ORAL DAILY
Qty: 90 TABLET | Refills: 0 | OUTPATIENT
Start: 2024-07-10

## 2024-07-10 NOTE — TELEPHONE ENCOUNTER
The pt called stating he was a Dr. Santos pt.  He made a NP appt with Dr. Jorgensen and was called and told he, too, and left the hosp system.  The pt now has a NP appt with a [facility] office, but it is not until September. He stated he will need a refill on his Atorvastatin and Pantoprazole.   He would like this sent to [facility] on [address]  CB# [phone]    I reviewed the pts chart and notified him it appears a Rx was sent in on 7/8/24 for the Atorvastatin (30 day NR).  The pt stated [facility] told him they did not have this Rx.  So, I called [facility] and spoke with someone in the pharmacy.  She stated the Rx for Atorvastatin \"was sent back\" b/c the pts insurance requires a 90 day Rx.  They will not pay for the 30 day supply. I will send this msg to the provider on-call to see if they will resend these for a 90 day supply so his insurance will cover it and to get him to his appt in Sept.

## 2024-07-17 ENCOUNTER — OFFICE VISIT (OUTPATIENT)
Age: 64
End: 2024-07-17
Payer: COMMERCIAL

## 2024-07-17 VITALS
HEART RATE: 67 BPM | DIASTOLIC BLOOD PRESSURE: 82 MMHG | WEIGHT: 279 LBS | BODY MASS INDEX: 36.98 KG/M2 | SYSTOLIC BLOOD PRESSURE: 138 MMHG | HEIGHT: 73 IN

## 2024-07-17 DIAGNOSIS — E78.00 PURE HYPERCHOLESTEROLEMIA, UNSPECIFIED: ICD-10-CM

## 2024-07-17 DIAGNOSIS — Z98.890 H/O CARDIAC RADIOFREQUENCY ABLATION: ICD-10-CM

## 2024-07-17 DIAGNOSIS — K21.9 GASTROESOPHAGEAL REFLUX DISEASE, UNSPECIFIED WHETHER ESOPHAGITIS PRESENT: ICD-10-CM

## 2024-07-17 DIAGNOSIS — I26.94 MULTIPLE SUBSEGMENTAL PULMONARY EMBOLI WITHOUT ACUTE COR PULMONALE (HCC): ICD-10-CM

## 2024-07-17 DIAGNOSIS — I48.0 PAROXYSMAL ATRIAL FIBRILLATION (HCC): Primary | ICD-10-CM

## 2024-07-17 DIAGNOSIS — E78.2 MIXED HYPERLIPIDEMIA: ICD-10-CM

## 2024-07-17 DIAGNOSIS — I10 ESSENTIAL HYPERTENSION: ICD-10-CM

## 2024-07-17 PROCEDURE — 3075F SYST BP GE 130 - 139MM HG: CPT | Performed by: INTERNAL MEDICINE

## 2024-07-17 PROCEDURE — 1123F ACP DISCUSS/DSCN MKR DOCD: CPT | Performed by: INTERNAL MEDICINE

## 2024-07-17 PROCEDURE — 93000 ELECTROCARDIOGRAM COMPLETE: CPT | Performed by: INTERNAL MEDICINE

## 2024-07-17 PROCEDURE — 3079F DIAST BP 80-89 MM HG: CPT | Performed by: INTERNAL MEDICINE

## 2024-07-17 PROCEDURE — 99214 OFFICE O/P EST MOD 30 MIN: CPT | Performed by: INTERNAL MEDICINE

## 2024-07-17 RX ORDER — ATORVASTATIN CALCIUM 40 MG/1
40 TABLET, FILM COATED ORAL DAILY
Qty: 90 TABLET | Refills: 3 | Status: SHIPPED | OUTPATIENT
Start: 2024-07-17

## 2024-07-17 RX ORDER — PANTOPRAZOLE SODIUM 40 MG/1
40 TABLET, DELAYED RELEASE ORAL DAILY
Qty: 90 TABLET | Refills: 0 | Status: SHIPPED | OUTPATIENT
Start: 2024-07-17

## 2024-07-17 ASSESSMENT — ENCOUNTER SYMPTOMS
COUGH: 0
RESPIRATORY NEGATIVE: 1
SHORTNESS OF BREATH: 0
GASTROINTESTINAL NEGATIVE: 1

## 2024-07-17 NOTE — PROGRESS NOTES
[address], [address]     [place] [id number]      Patient:  Peter Warren  1960       SUBJECTIVE:  Peter Warren is a  64 y.o. male seen for a follow up visit regarding the following:     Chief Complaint   Patient presents with    Atrial Fibrillation    Follow-up     CC: Atrial fibrillation, HTN, HLD follow up      HPI:  64 y.o. male with a history of atrial fibrillation with prior ablation x 2 in 2015, third ablation in 2016,  PE 1/27/22, HTN, HLD, GERD, obesity, COVID in June 2022, reported sarcoidosis remission in 2004, bronchoscopy who is here for follow up.    Patient was last seen in office on 7/18/22 , since then reports that he has been doing well. Working to establish with new PCP. Seen by pulmonology, stopped anticoagulation due to hematuria.  Since then no further hematuria.  Denies chest pain or chest pressure, racing heart or palpitations, dizziness, lightheadedness, leg swelling.  Has been checking his blood pressure at home typically in the 130s systolic.  No other complaints at this time.  No interval ER visits or hospitalizations for cardiac problems.    Cardiovascular Testing:  - Echo 1/28/22: LVEF >60%, RV normal, no hemodynamically significant valve abnormalities.   - Atrial fibrillation ablation 12/22/2016 [place], [place]  - Atrial Fibrillation ablation September 22, 2015 and December 22, 2015 with Dr. Eduard Singh at [place], [place].  -KHANH 12/22/2016 [place], [place]: LVEF 55%, no evidence of left atrial appendage, left atrium, right atrium thrombus, small atrial septal defect left-to-right shunt.  -Cardiac MRI [place], [place] 5/8/14: LVEF 51%, no regional wall motion abnormalities, RV normal size/function.  No LGE.    Past medical history, past surgical history, family history, social history, and medications were all reviewed with the patient today

## 2024-07-19 DIAGNOSIS — I48.0 PAROXYSMAL ATRIAL FIBRILLATION (HCC): ICD-10-CM

## 2024-07-19 DIAGNOSIS — Z98.890 H/O CARDIAC RADIOFREQUENCY ABLATION: ICD-10-CM

## 2024-07-19 DIAGNOSIS — I10 ESSENTIAL HYPERTENSION: ICD-10-CM

## 2024-07-19 DIAGNOSIS — E78.2 MIXED HYPERLIPIDEMIA: ICD-10-CM

## 2024-07-19 LAB
ALBUMIN SERPL-MCNC: 4.1 G/DL (ref 3.2–4.6)
ALBUMIN/GLOB SERPL: 1.7 (ref 1–1.9)
ALP SERPL-CCNC: 133 U/L (ref 40–129)
ALT SERPL-CCNC: 20 U/L (ref 12–65)
ANION GAP SERPL CALC-SCNC: 12 MMOL/L (ref 9–18)
AST SERPL-CCNC: 25 U/L (ref 15–37)
BILIRUB SERPL-MCNC: 0.5 MG/DL (ref 0–1.2)
BUN SERPL-MCNC: 10 MG/DL (ref 8–23)
CALCIUM SERPL-MCNC: 9.4 MG/DL (ref 8.8–10.2)
CHLORIDE SERPL-SCNC: 102 MMOL/L (ref 98–107)
CHOLEST SERPL-MCNC: 151 MG/DL (ref 0–200)
CO2 SERPL-SCNC: 24 MMOL/L (ref 20–28)
CREAT SERPL-MCNC: 0.85 MG/DL (ref 0.8–1.3)
ERYTHROCYTE [DISTWIDTH] IN BLOOD BY AUTOMATED COUNT: 13.5 % (ref 11.9–14.6)
GLOBULIN SER CALC-MCNC: 2.4 G/DL (ref 2.3–3.5)
GLUCOSE SERPL-MCNC: 115 MG/DL (ref 70–99)
HCT VFR BLD AUTO: 43.1 % (ref 41.1–50.3)
HDLC SERPL-MCNC: 43 MG/DL (ref 40–60)
HDLC SERPL: 3.5 (ref 0–5)
HGB BLD-MCNC: 13.9 G/DL (ref 13.6–17.2)
LDLC SERPL CALC-MCNC: 95 MG/DL (ref 0–100)
MCH RBC QN AUTO: 30.2 PG (ref 26.1–32.9)
MCHC RBC AUTO-ENTMCNC: 32.3 G/DL (ref 31.4–35)
MCV RBC AUTO: 93.7 FL (ref 82–102)
NRBC # BLD: 0 K/UL (ref 0–0.2)
PLATELET # BLD AUTO: 279 K/UL (ref 150–450)
PMV BLD AUTO: 11 FL (ref 9.4–12.3)
POTASSIUM SERPL-SCNC: 4.5 MMOL/L (ref 3.5–5.1)
PROT SERPL-MCNC: 6.4 G/DL (ref 6.3–8.2)
RBC # BLD AUTO: 4.6 M/UL (ref 4.23–5.6)
SODIUM SERPL-SCNC: 139 MMOL/L (ref 136–145)
TRIGL SERPL-MCNC: 67 MG/DL (ref 0–150)
VLDLC SERPL CALC-MCNC: 13 MG/DL (ref 6–23)
WBC # BLD AUTO: 8.5 K/UL (ref 4.3–11.1)

## 2024-07-22 NOTE — RESULT ENCOUNTER NOTE
Please call the patient regarding their result.    Kidney function, electrolytes, cholesterol numbers look improved compared with 6 months ago.  Very slight elevation of one of the liver test called alkaline phosphatase.     Recommend following up with PCP regarding mild liver lab abnormality.

## 2024-07-29 ENCOUNTER — OFFICE VISIT (OUTPATIENT)
Dept: PULMONOLOGY | Age: 64
End: 2024-07-29
Payer: COMMERCIAL

## 2024-07-29 VITALS
HEART RATE: 67 BPM | OXYGEN SATURATION: 98 % | SYSTOLIC BLOOD PRESSURE: 134 MMHG | HEIGHT: 73 IN | RESPIRATION RATE: 16 BRPM | WEIGHT: 257 LBS | DIASTOLIC BLOOD PRESSURE: 74 MMHG | TEMPERATURE: 97.2 F | BODY MASS INDEX: 34.06 KG/M2

## 2024-07-29 DIAGNOSIS — D86.9 SARCOIDOSIS: Primary | ICD-10-CM

## 2024-07-29 DIAGNOSIS — Z86.711 HISTORY OF PULMONARY EMBOLISM: ICD-10-CM

## 2024-07-29 PROCEDURE — 3078F DIAST BP <80 MM HG: CPT | Performed by: INTERNAL MEDICINE

## 2024-07-29 PROCEDURE — 1123F ACP DISCUSS/DSCN MKR DOCD: CPT | Performed by: INTERNAL MEDICINE

## 2024-07-29 PROCEDURE — 3075F SYST BP GE 130 - 139MM HG: CPT | Performed by: INTERNAL MEDICINE

## 2024-07-29 PROCEDURE — 99214 OFFICE O/P EST MOD 30 MIN: CPT | Performed by: INTERNAL MEDICINE

## 2024-07-29 NOTE — PROGRESS NOTES
Patient Name:  Peter Warren                               YOB: 1960  MRN: [id number]                                                Office Visit 7/29/2024    ASSESSMENT AND PLAN:  (Medical Decision Making)    1. Sarcoidosis  Has been quiescent since 2004.  Will check CPFT every 2-3 years and will perform imaging only prn symptoms.  Annual labs should be monitored for hypercalcemia, renal dysfunction or liver abnormalities.      2. History of pulmonary embolism  In Jan 2022, presumed to have been provoked.  Currently monitoring off anticoagulation.      Follow-up and Dispositions    Return in about 1 year (around 7/29/2025).       Grace Zamarripa MD    Total time for encounter on day of encounter was 30 minutes.  This time includes chart prep, review of tests/procedures, review of other provider's notes, documentation and counseling patient regarding disease process and medications.     ___________________________________________________________________         ______      REASON FOR VISIT:   Chief Complaint   Patient presents with    Sarcoidosis    PE       HISTORY OF PRESENT ILLNESS:    Mr. Peter Warren is a 64 y.o. male with a PMH of acid reflux, obstructive sleep apnea, hypercholesterolemia, pulmonary embolism in January 2022, pulmonary sarcoidosis diagnosed in [place] in 2002 (describes pulmonary infiltrates, bronchoscopy, no records available), prior 4-pack-year smoker (quit 1985), DANE on CPAP, a fib s/p ablation in 2015/16 who is seen at [facility] for sarcoidosis.      In 2002, he started to have shortness of breath and chest tightness that led to an x-ray which showed hazy opacities.  He was referred to a pulmonologist who performed bronchoscopy and diagnosed sarcoidosis.  He was then treated with steroids followed by Plaquenil for 2 years.  He was told his sarcoidosis was in remission at that point and he was able to discontinue all medications.  He has

## 2025-02-03 ENCOUNTER — OFFICE VISIT (OUTPATIENT)
Age: 65
End: 2025-02-03
Payer: COMMERCIAL

## 2025-02-03 VITALS
HEIGHT: 73 IN | DIASTOLIC BLOOD PRESSURE: 78 MMHG | WEIGHT: 275 LBS | HEART RATE: 80 BPM | BODY MASS INDEX: 36.45 KG/M2 | SYSTOLIC BLOOD PRESSURE: 132 MMHG

## 2025-02-03 DIAGNOSIS — I10 ESSENTIAL HYPERTENSION: ICD-10-CM

## 2025-02-03 DIAGNOSIS — E78.2 MIXED HYPERLIPIDEMIA: ICD-10-CM

## 2025-02-03 DIAGNOSIS — I48.0 PAROXYSMAL ATRIAL FIBRILLATION (HCC): Primary | ICD-10-CM

## 2025-02-03 DIAGNOSIS — Z98.890 H/O CARDIAC RADIOFREQUENCY ABLATION: ICD-10-CM

## 2025-02-03 PROCEDURE — 3078F DIAST BP <80 MM HG: CPT | Performed by: INTERNAL MEDICINE

## 2025-02-03 PROCEDURE — 3075F SYST BP GE 130 - 139MM HG: CPT | Performed by: INTERNAL MEDICINE

## 2025-02-03 PROCEDURE — 1123F ACP DISCUSS/DSCN MKR DOCD: CPT | Performed by: INTERNAL MEDICINE

## 2025-02-03 PROCEDURE — 99214 OFFICE O/P EST MOD 30 MIN: CPT | Performed by: INTERNAL MEDICINE

## 2025-02-03 ASSESSMENT — ENCOUNTER SYMPTOMS
ABDOMINAL PAIN: 0
SHORTNESS OF BREATH: 0

## 2025-02-03 NOTE — PROGRESS NOTES
[facility] CARDIOLOGY  [address] [place] [address], [address]  [place] [address]  PHONE: [phone]      25    NAME:  Peter Warren  : 1960  MRN: [id number]         SUBJECTIVE:   Peter Warren is a 64 y.o. male seen for a follow up visit regarding the following:     Chief Complaint   Patient presents with    Atrial Fibrillation            HPI:  Follow up  Atrial Fibrillation   .    Mr. Warren presents today for follow-up.  Patient was previously seen by Dr. Flores.  He has a history of atrial fibrillation status post PVI x 3.  Most recent was in 2016 and he has had no symptomatic recurrence since then.  Patient is not on anticoagulation at present, post ablation his [id number] or was 1, it is now 2 with his age.  He did have hematuria previously with Eliquis and would prefer to remain off anticoagulation.  Overall, he has done well in the last 6 months.  He denies chest pain, shortness of breath, orthopnea, PND, palpitations, syncope or lower extremity swelling.  No recent hospitalizations or ER visits.    Atrial Fibrillation  Symptoms are negative for shortness of breath.           Cardiac Medications       Calcium Channel Blockers       diltiazem (CARDIZEM CD) 240 MG extended release capsule every evening        HMG CoA Reductase Inhibitors       atorvastatin (LIPITOR) 40 MG tablet Take 1 tablet by mouth daily       Coumarin Anticoagulants       warfarin (COUMADIN) 5 MG tablet                   Past Medical History, Past Surgical History, Family history, Social History, and Medications were all reviewed with the patient today and updated as necessary.     Prior to Admission medications    Medication Sig Start Date End Date Taking? Authorizing Provider   atorvastatin (LIPITOR) 40 MG tablet Take 1 tablet by mouth daily 24  Yes Davy Flores, DO   pantoprazole (PROTONIX) 40 MG tablet Take 1 tablet by mouth daily 24  Yes Davy Flores, DO   meloxicam (MOBIC)

## 2025-02-17 ENCOUNTER — LAB (OUTPATIENT)
Dept: UROLOGY | Age: 65
End: 2025-02-17

## 2025-02-17 DIAGNOSIS — Z85.46 HISTORY OF PROSTATE CANCER: ICD-10-CM

## 2025-02-17 LAB — PSA SERPL-MCNC: <0.1 NG/ML (ref 0–4)

## 2025-02-24 ENCOUNTER — OFFICE VISIT (OUTPATIENT)
Dept: UROLOGY | Age: 65
End: 2025-02-24
Payer: COMMERCIAL

## 2025-02-24 DIAGNOSIS — Z85.46 HISTORY OF PROSTATE CANCER: Primary | ICD-10-CM

## 2025-02-24 DIAGNOSIS — R31.0 GROSS HEMATURIA: ICD-10-CM

## 2025-02-24 DIAGNOSIS — Z87.442 HISTORY OF KIDNEY STONES: ICD-10-CM

## 2025-02-24 LAB
BILIRUBIN, URINE, POC: NEGATIVE
BLOOD URINE, POC: NEGATIVE
GLUCOSE URINE, POC: NEGATIVE MG/DL
KETONES, URINE, POC: NEGATIVE MG/DL
LEUKOCYTE ESTERASE, URINE, POC: NEGATIVE
NITRITE, URINE, POC: NEGATIVE
PH, URINE, POC: 6 (ref 4.6–8)
PROTEIN,URINE, POC: NEGATIVE MG/DL
SPECIFIC GRAVITY, URINE, POC: 1.01 (ref 1–1.03)
URINALYSIS CLARITY, POC: NORMAL
URINALYSIS COLOR, POC: NORMAL
UROBILINOGEN, POC: NORMAL MG/DL

## 2025-02-24 PROCEDURE — 81003 URINALYSIS AUTO W/O SCOPE: CPT | Performed by: UROLOGY

## 2025-02-24 PROCEDURE — 74018 RADEX ABDOMEN 1 VIEW: CPT | Performed by: UROLOGY

## 2025-02-24 PROCEDURE — 99214 OFFICE O/P EST MOD 30 MIN: CPT | Performed by: UROLOGY

## 2025-02-24 PROCEDURE — 1123F ACP DISCUSS/DSCN MKR DOCD: CPT | Performed by: UROLOGY

## 2025-02-25 ASSESSMENT — ENCOUNTER SYMPTOMS
EYE DISCHARGE: 0
COUGH: 0
SKIN LESIONS: 0
HEARTBURN: 0
WHEEZING: 0
DIARRHEA: 0
SHORTNESS OF BREATH: 0
VOMITING: 0
CONSTIPATION: 0
BLOOD IN STOOL: 0
ABDOMINAL PAIN: 0
NAUSEA: 0
INDIGESTION: 0
BACK PAIN: 0
EYE PAIN: 0

## 2025-02-25 NOTE — PROGRESS NOTES
[facility]  [address]   [address]  [place] [address]  [phone]    Peter Warren  : 1960    Chief Complaint   Patient presents with    Follow-up     HPI     Peter Warren is a 64 y.o. male    History of Present Illness  The patient is a pleasant 64-year-old gentleman with a history of prostate cancer, radiation cystitis, erectile dysfunction, low testosterone, and gross hematuria. Returns for follow up.     He had a prior RALP, Vero seven and adjuvant radiation therapy back in  in [place] for T3a, [place] seven prostate cancer. He had a hematuria work-up in  showing radiation cystitis. He has failed all ED treatment except for IPP. He has a 2 mm right kidney stone on CT from 2024 that we were watching. He does have stress incontinence and was sent to pelvic floor physical therapy last year.     His PSA is undetectable this year.      His x-ray this year does not show any significant kidney, ureter, or bladder stones.  He reports no issues related to renal calculi. However, he experiences occasional hematuria, which has become less frequent since discontinuing anticoagulant therapy. The hematuria is typically characterized by the presence of clots rather than bright red blood. He expresses a preference to remain off anticoagulants, which is cardiologist is okay with.    He has completed a course of pelvic floor physical therapy, which he found beneficial after approximately one month. He finds it challenging to maintain the exercise regimen, particularly as certain positions, such as lying down, facilitate the exercises more than others. He utilizes protective pads during heavy lifting activities and notes that stress incontinence can be triggered by sudden movements like bending over or standing up. He is learning to manage these symptoms.  Does not want surgery for his FIORDALIZA.    Lab Results   Component Value Date    PSA <0.1 2025

## 2025-04-07 SDOH — ECONOMIC STABILITY: FOOD INSECURITY: WITHIN THE PAST 12 MONTHS, YOU WORRIED THAT YOUR FOOD WOULD RUN OUT BEFORE YOU GOT MONEY TO BUY MORE.: NEVER TRUE

## 2025-04-07 SDOH — ECONOMIC STABILITY: TRANSPORTATION INSECURITY
IN THE PAST 12 MONTHS, HAS LACK OF TRANSPORTATION KEPT YOU FROM MEETINGS, WORK, OR FROM GETTING THINGS NEEDED FOR DAILY LIVING?: NO

## 2025-04-07 SDOH — ECONOMIC STABILITY: TRANSPORTATION INSECURITY
IN THE PAST 12 MONTHS, HAS THE LACK OF TRANSPORTATION KEPT YOU FROM MEDICAL APPOINTMENTS OR FROM GETTING MEDICATIONS?: NO

## 2025-04-07 SDOH — ECONOMIC STABILITY: FOOD INSECURITY: WITHIN THE PAST 12 MONTHS, THE FOOD YOU BOUGHT JUST DIDN'T LAST AND YOU DIDN'T HAVE MONEY TO GET MORE.: NEVER TRUE

## 2025-04-07 SDOH — ECONOMIC STABILITY: INCOME INSECURITY: IN THE LAST 12 MONTHS, WAS THERE A TIME WHEN YOU WERE NOT ABLE TO PAY THE MORTGAGE OR RENT ON TIME?: NO

## 2025-04-07 SDOH — HEALTH STABILITY: PHYSICAL HEALTH: ON AVERAGE, HOW MANY MINUTES DO YOU ENGAGE IN EXERCISE AT THIS LEVEL?: 60 MIN

## 2025-04-07 SDOH — HEALTH STABILITY: PHYSICAL HEALTH: ON AVERAGE, HOW MANY DAYS PER WEEK DO YOU ENGAGE IN MODERATE TO STRENUOUS EXERCISE (LIKE A BRISK WALK)?: 3 DAYS

## 2025-04-07 ASSESSMENT — PATIENT HEALTH QUESTIONNAIRE - PHQ9
2. FEELING DOWN, DEPRESSED OR HOPELESS: NOT AT ALL
1. LITTLE INTEREST OR PLEASURE IN DOING THINGS: NOT AT ALL
SUM OF ALL RESPONSES TO PHQ QUESTIONS 1-9: 0
1. LITTLE INTEREST OR PLEASURE IN DOING THINGS: NOT AT ALL
SUM OF ALL RESPONSES TO PHQ QUESTIONS 1-9: 0
SUM OF ALL RESPONSES TO PHQ QUESTIONS 1-9: 0
2. FEELING DOWN, DEPRESSED OR HOPELESS: NOT AT ALL
SUM OF ALL RESPONSES TO PHQ9 QUESTIONS 1 & 2: 0
SUM OF ALL RESPONSES TO PHQ QUESTIONS 1-9: 0

## 2025-04-10 ENCOUNTER — OFFICE VISIT (OUTPATIENT)
Dept: INTERNAL MEDICINE CLINIC | Facility: CLINIC | Age: 65
End: 2025-04-10
Payer: COMMERCIAL

## 2025-04-10 ENCOUNTER — PATIENT MESSAGE (OUTPATIENT)
Age: 65
End: 2025-04-10

## 2025-04-10 VITALS
WEIGHT: 267 LBS | HEART RATE: 75 BPM | HEIGHT: 73 IN | BODY MASS INDEX: 35.39 KG/M2 | OXYGEN SATURATION: 99 % | SYSTOLIC BLOOD PRESSURE: 126 MMHG | DIASTOLIC BLOOD PRESSURE: 77 MMHG

## 2025-04-10 DIAGNOSIS — M17.0 BILATERAL PRIMARY OSTEOARTHRITIS OF KNEE: ICD-10-CM

## 2025-04-10 DIAGNOSIS — Z87.442 HISTORY OF NEPHROLITHIASIS: ICD-10-CM

## 2025-04-10 DIAGNOSIS — R73.03 PREDIABETES: ICD-10-CM

## 2025-04-10 DIAGNOSIS — K21.00 GASTROESOPHAGEAL REFLUX DISEASE WITH ESOPHAGITIS, UNSPECIFIED WHETHER HEMORRHAGE: ICD-10-CM

## 2025-04-10 DIAGNOSIS — G47.33 OSA (OBSTRUCTIVE SLEEP APNEA): ICD-10-CM

## 2025-04-10 DIAGNOSIS — M17.12 PRIMARY OSTEOARTHRITIS OF LEFT KNEE: ICD-10-CM

## 2025-04-10 DIAGNOSIS — M54.50 ACUTE BILATERAL LOW BACK PAIN WITHOUT SCIATICA: ICD-10-CM

## 2025-04-10 DIAGNOSIS — E66.9 OBESITY (BMI 35.0-39.9 WITHOUT COMORBIDITY): ICD-10-CM

## 2025-04-10 DIAGNOSIS — K21.9 GASTROESOPHAGEAL REFLUX DISEASE, UNSPECIFIED WHETHER ESOPHAGITIS PRESENT: ICD-10-CM

## 2025-04-10 DIAGNOSIS — I10 PRIMARY HYPERTENSION: ICD-10-CM

## 2025-04-10 DIAGNOSIS — E78.00 PURE HYPERCHOLESTEROLEMIA, UNSPECIFIED: Primary | ICD-10-CM

## 2025-04-10 DIAGNOSIS — E78.00 PURE HYPERCHOLESTEROLEMIA, UNSPECIFIED: ICD-10-CM

## 2025-04-10 DIAGNOSIS — I48.0 PAROXYSMAL ATRIAL FIBRILLATION (HCC): ICD-10-CM

## 2025-04-10 PROBLEM — G56.01 RIGHT CARPAL TUNNEL SYNDROME: Status: RESOLVED | Noted: 2022-07-12 | Resolved: 2025-04-10

## 2025-04-10 PROBLEM — M65.90 TENOSYNOVITIS: Status: RESOLVED | Noted: 2022-07-12 | Resolved: 2025-04-10

## 2025-04-10 PROBLEM — R20.0 BILATERAL LEG NUMBNESS: Status: RESOLVED | Noted: 2022-04-14 | Resolved: 2025-04-10

## 2025-04-10 PROBLEM — U07.1 COVID-19: Status: RESOLVED | Noted: 2022-06-22 | Resolved: 2025-04-10

## 2025-04-10 PROBLEM — Z85.828 HX OF SKIN CANCER, BASAL CELL: Status: RESOLVED | Noted: 2022-01-05 | Resolved: 2025-04-10

## 2025-04-10 PROBLEM — C61 PROSTATE CANCER (HCC): Status: RESOLVED | Noted: 2022-01-05 | Resolved: 2025-04-10

## 2025-04-10 PROBLEM — G56.22 CUBITAL TUNNEL SYNDROME, LEFT: Status: RESOLVED | Noted: 2022-07-12 | Resolved: 2025-04-10

## 2025-04-10 PROBLEM — M65.331 TRIGGER MIDDLE FINGER OF RIGHT HAND: Status: RESOLVED | Noted: 2022-04-14 | Resolved: 2025-04-10

## 2025-04-10 PROBLEM — G56.03 BILATERAL CARPAL TUNNEL SYNDROME: Status: RESOLVED | Noted: 2022-04-18 | Resolved: 2025-04-10

## 2025-04-10 PROBLEM — I26.99 PULMONARY EMBOLISM: Status: RESOLVED | Noted: 2022-01-28 | Resolved: 2025-04-10

## 2025-04-10 PROBLEM — S83.242A TEAR OF MEDIAL MENISCUS OF LEFT KNEE: Status: RESOLVED | Noted: 2017-02-07 | Resolved: 2025-04-10

## 2025-04-10 PROBLEM — G56.21 CUBITAL TUNNEL SYNDROME, RIGHT: Status: RESOLVED | Noted: 2022-07-12 | Resolved: 2025-04-10

## 2025-04-10 PROBLEM — G62.9 PERIPHERAL POLYNEUROPATHY: Status: RESOLVED | Noted: 2022-01-05 | Resolved: 2025-04-10

## 2025-04-10 PROBLEM — Z00.00 PREVENTATIVE HEALTH CARE: Status: RESOLVED | Noted: 2022-01-05 | Resolved: 2025-04-10

## 2025-04-10 PROBLEM — M79.641 BILATERAL HAND PAIN: Status: RESOLVED | Noted: 2022-07-07 | Resolved: 2025-04-10

## 2025-04-10 PROBLEM — M79.642 BILATERAL HAND PAIN: Status: RESOLVED | Noted: 2022-07-07 | Resolved: 2025-04-10

## 2025-04-10 PROBLEM — G56.02 LEFT CARPAL TUNNEL SYNDROME: Status: RESOLVED | Noted: 2022-07-12 | Resolved: 2025-04-10

## 2025-04-10 PROBLEM — R20.2 PARESTHESIA OF BOTH HANDS: Status: RESOLVED | Noted: 2022-07-07 | Resolved: 2025-04-10

## 2025-04-10 LAB
ALBUMIN SERPL-MCNC: 3.9 G/DL (ref 3.2–4.6)
ALBUMIN/GLOB SERPL: 1.3 (ref 1–1.9)
ALP SERPL-CCNC: 117 U/L (ref 40–129)
ALT SERPL-CCNC: 24 U/L (ref 8–55)
ANION GAP SERPL CALC-SCNC: 10 MMOL/L (ref 7–16)
AST SERPL-CCNC: 24 U/L (ref 15–37)
BASOPHILS # BLD: 0.03 K/UL (ref 0–0.2)
BASOPHILS NFR BLD: 0.3 % (ref 0–2)
BILIRUB SERPL-MCNC: 0.5 MG/DL (ref 0–1.2)
BUN SERPL-MCNC: 17 MG/DL (ref 8–23)
CALCIUM SERPL-MCNC: 9.2 MG/DL (ref 8.8–10.2)
CHLORIDE SERPL-SCNC: 105 MMOL/L (ref 98–107)
CHOLEST SERPL-MCNC: 158 MG/DL (ref 0–200)
CO2 SERPL-SCNC: 25 MMOL/L (ref 20–29)
CREAT SERPL-MCNC: 0.75 MG/DL (ref 0.8–1.3)
DIFFERENTIAL METHOD BLD: NORMAL
EOSINOPHIL # BLD: 0.17 K/UL (ref 0–0.8)
EOSINOPHIL NFR BLD: 1.9 % (ref 0.5–7.8)
ERYTHROCYTE [DISTWIDTH] IN BLOOD BY AUTOMATED COUNT: 13.9 % (ref 11.9–14.6)
EST. AVERAGE GLUCOSE BLD GHB EST-MCNC: 130 MG/DL
GLOBULIN SER CALC-MCNC: 3 G/DL (ref 2.3–3.5)
GLUCOSE SERPL-MCNC: 117 MG/DL (ref 70–99)
HBA1C MFR BLD: 6.2 % (ref 0–5.6)
HCT VFR BLD AUTO: 41.7 % (ref 41.1–50.3)
HDLC SERPL-MCNC: 61 MG/DL (ref 40–60)
HDLC SERPL: 2.6 (ref 0–5)
HGB BLD-MCNC: 13.9 G/DL (ref 13.6–17.2)
IMM GRANULOCYTES # BLD AUTO: 0.05 K/UL (ref 0–0.5)
IMM GRANULOCYTES NFR BLD AUTO: 0.6 % (ref 0–5)
LDLC SERPL CALC-MCNC: 81 MG/DL (ref 0–100)
LYMPHOCYTES # BLD: 3.06 K/UL (ref 0.5–4.6)
LYMPHOCYTES NFR BLD: 34.7 % (ref 13–44)
MCH RBC QN AUTO: 29.7 PG (ref 26.1–32.9)
MCHC RBC AUTO-ENTMCNC: 33.3 G/DL (ref 31.4–35)
MCV RBC AUTO: 89.1 FL (ref 82–102)
MONOCYTES # BLD: 0.73 K/UL (ref 0.1–1.3)
MONOCYTES NFR BLD: 8.3 % (ref 4–12)
NEUTS SEG # BLD: 4.77 K/UL (ref 1.7–8.2)
NEUTS SEG NFR BLD: 54.2 % (ref 43–78)
NRBC # BLD: 0 K/UL (ref 0–0.2)
PLATELET # BLD AUTO: 254 K/UL (ref 150–450)
PMV BLD AUTO: 11 FL (ref 9.4–12.3)
POTASSIUM SERPL-SCNC: 4.1 MMOL/L (ref 3.5–5.1)
PROT SERPL-MCNC: 6.9 G/DL (ref 6.3–8.2)
RBC # BLD AUTO: 4.68 M/UL (ref 4.23–5.6)
SODIUM SERPL-SCNC: 139 MMOL/L (ref 136–145)
TRIGL SERPL-MCNC: 77 MG/DL (ref 0–150)
VLDLC SERPL CALC-MCNC: 15 MG/DL (ref 6–23)
WBC # BLD AUTO: 8.8 K/UL (ref 4.3–11.1)

## 2025-04-10 PROCEDURE — 99214 OFFICE O/P EST MOD 30 MIN: CPT | Performed by: INTERNAL MEDICINE

## 2025-04-10 PROCEDURE — 3074F SYST BP LT 130 MM HG: CPT | Performed by: INTERNAL MEDICINE

## 2025-04-10 PROCEDURE — 3078F DIAST BP <80 MM HG: CPT | Performed by: INTERNAL MEDICINE

## 2025-04-10 PROCEDURE — 1123F ACP DISCUSS/DSCN MKR DOCD: CPT | Performed by: INTERNAL MEDICINE

## 2025-04-10 PROCEDURE — G2211 COMPLEX E/M VISIT ADD ON: HCPCS | Performed by: INTERNAL MEDICINE

## 2025-04-10 RX ORDER — PANTOPRAZOLE SODIUM 40 MG/1
40 TABLET, DELAYED RELEASE ORAL DAILY
Qty: 90 TABLET | Refills: 3 | Status: SHIPPED | OUTPATIENT
Start: 2025-04-10

## 2025-04-10 RX ORDER — ATORVASTATIN CALCIUM 40 MG/1
40 TABLET, FILM COATED ORAL DAILY
Qty: 90 TABLET | Refills: 3 | Status: SHIPPED | OUTPATIENT
Start: 2025-04-10

## 2025-04-10 NOTE — PROGRESS NOTES
labs should also be monitored for hypercalcemia, renal dysfunction or liver abnormalities.  - Patient will follow-up with pulmonology around 7/29/2025.    History of pulmonary embolism  - Pulmonary embolism in January 2022, presumed to be have been provoked.  He is monitoring off of anticoagulation.    History of prostate cancer  -He had a prior RALP, [place] seven and adjuvant radiation therapy back in 2014 in [place] for T3a, Vero seven prostate cancer.   -Patient following with urology, last seen 2/24/2025, it was noted that his PSA was undetectable at this time and follow-up will be scheduled in 1 year.  - Patient will follow-up with urology on 2/25/2026    -Previous medical records and/or labs/tests available to me reviewed, any records outstanding not available requested  -The risks, benefits, and medical necessity of all medications, tests labs, and any other orders that were ordered at today's visit were discussed with the patient including all elective or patient requested orders.  Decision to order or not order tests are based on this risk/benefit ratio and medical necessity.  -The patient expresses understanding of the plan as I've explained it to him and is in agreement with the current plan.    Follow Up: 3 months and then 6 months    Total Time: 30 minutes (chart reviewing and in-exam time)    Signed: Venancio Verma D.O.  04/10/25  1:57 PM

## 2025-04-11 ENCOUNTER — RESULTS FOLLOW-UP (OUTPATIENT)
Dept: INTERNAL MEDICINE CLINIC | Facility: CLINIC | Age: 65
End: 2025-04-11

## 2025-04-11 NOTE — RESULT ENCOUNTER NOTE
Patient's hemoglobin A1c is stable at 6.2.  He should continue focusing on eating healthy and getting enough exercise to keep these numbers stable.

## 2025-04-14 DIAGNOSIS — E66.9 OBESITY (BMI 35.0-39.9 WITHOUT COMORBIDITY): Primary | ICD-10-CM

## 2025-04-14 RX ORDER — PHENTERMINE HYDROCHLORIDE 15 MG/1
15 CAPSULE ORAL EVERY MORNING
Qty: 30 CAPSULE | Refills: 2 | Status: SHIPPED | OUTPATIENT
Start: 2025-04-14 | End: 2025-07-13

## 2025-04-18 ENCOUNTER — HOSPITAL ENCOUNTER (OUTPATIENT)
Dept: CT IMAGING | Age: 65
Discharge: HOME | End: 2025-04-20
Attending: INTERNAL MEDICINE
Payer: COMMERCIAL

## 2025-04-18 DIAGNOSIS — Z87.442 HISTORY OF NEPHROLITHIASIS: ICD-10-CM

## 2025-04-18 DIAGNOSIS — M54.50 ACUTE BILATERAL LOW BACK PAIN WITHOUT SCIATICA: ICD-10-CM

## 2025-04-18 PROCEDURE — 74176 CT ABD & PELVIS W/O CONTRAST: CPT

## 2025-04-21 ENCOUNTER — RESULTS FOLLOW-UP (OUTPATIENT)
Dept: INTERNAL MEDICINE CLINIC | Facility: CLINIC | Age: 65
End: 2025-04-21

## 2025-04-21 NOTE — RESULT ENCOUNTER NOTE
Patient's CT scan did not show any obstructive renal stones just nonobstructing kidney stones on both sides.

## 2025-05-14 ENCOUNTER — OFFICE VISIT (OUTPATIENT)
Dept: ORTHOPEDIC SURGERY | Age: 65
End: 2025-05-14

## 2025-05-14 VITALS — HEIGHT: 73 IN | WEIGHT: 260 LBS | BODY MASS INDEX: 34.46 KG/M2

## 2025-05-14 DIAGNOSIS — M25.562 LEFT KNEE PAIN, UNSPECIFIED CHRONICITY: Primary | ICD-10-CM

## 2025-05-14 DIAGNOSIS — M17.12 PRIMARY OSTEOARTHRITIS OF LEFT KNEE: ICD-10-CM

## 2025-05-14 DIAGNOSIS — M17.0 PRIMARY OSTEOARTHRITIS OF BOTH KNEES: Primary | ICD-10-CM

## 2025-05-14 DIAGNOSIS — M17.11 PRIMARY OSTEOARTHRITIS OF RIGHT KNEE: ICD-10-CM

## 2025-05-14 NOTE — PROGRESS NOTES
[facility]          Patient ID:  Name: Peter Warren  AGE/Gender: 64 y.o. male  MRN: [id number]  : 1960    Date of Consultation:  May 14, 2025          ALLERGIES:   Allergies   Allergen Reactions    Clavulanic Acid     Amoxicillin Swelling and Rash    Amoxicillin Rash        CC: Bilateral knee pain    History:  The patient presents today as a new patient complaining of Bilateral L>R knee pain.  He has had knee problems for years with 3 meniscus surgeries on the right and 1 on the left in 2016.  The knee pain has become progressively worse but the left is bothering him more than the right and he says it feels like another meniscal injury.  He saw a provider at [facility] who tried a steroid injection and referred him to physical therapy.  The physical therapy made his knee worse.  He obtained an MRI scan that demonstrated free edge radial tearing of the medial meniscal body and posterior horn and chondral loss of the medial and patellofemoral compartments.  The patient localizes most of the pain to the medial aspect of the left knee but has pain with both knees.  He says that particular when he is driving he has difficulty using the clutch and position his knee at 45 degrees causes some discomfort.  He was using a cane but is no longer using a cane.  He has a history of DVT PE sarcoidosis.  He is not on blood thinners as he had severe hematuria when he tried blood thinners as a result of his prostate cancer.  He has tried Tylenol and Aleve with minimal relief.  He has tried knee braces and knee sleeves.  They have no other complaints or concerns.    Review of Systems:  Pertinent items are noted in HPI.    Past Medical History Includes:   Past Medical History:   Diagnosis Date    A-fib (HCC)     A-fib (HCC)     managed with med    Anesthesia     woke up early from ablation    Atrial fibrillation (HCC) ,    3 ablations    Bilateral carpal tunnel syndrome 2022

## 2025-05-20 ENCOUNTER — OFFICE VISIT (OUTPATIENT)
Dept: ORTHOPEDIC SURGERY | Age: 65
End: 2025-05-20
Payer: COMMERCIAL

## 2025-05-20 DIAGNOSIS — M17.12 PRIMARY OSTEOARTHRITIS OF LEFT KNEE: ICD-10-CM

## 2025-05-20 DIAGNOSIS — M17.11 PRIMARY OSTEOARTHRITIS OF RIGHT KNEE: Primary | ICD-10-CM

## 2025-05-20 PROCEDURE — 20610 DRAIN/INJ JOINT/BURSA W/O US: CPT | Performed by: PHYSICIAN ASSISTANT

## 2025-05-20 NOTE — PROGRESS NOTES
[facility]          Patient ID:  Name: Peter Warren  AGE/Gender: 64 y.o. male  MRN: [id number]  : 1960    Date of Consultation:  May 20, 2025        ALLERGIES:   Allergies   Allergen Reactions    Clavulanic Acid     Amoxicillin Swelling and Rash    Amoxicillin Rash          Diagnosis: Osteoarthritis of Both Knees    PROCEDURE:  2ml of Euflexxa  Injection of Both Knees        The procedure was explained to the patient and possible adverse reactions were discussed.      TIME OUT performed immediately prior to start of procedure:   IDinesh PA, have performed the following reviews on Peter Warren prior to the start of the procedure:            * Patient was identified by name and date of birth   * Agreement on procedure being performed was verified  * Risks and Benefits explained to the patient  * Procedure site verified and marked as necessary  * Patient was positioned for comfort  Both Knees anterolateral aspect was prepped and cleansed with: sterile fashion with chlorhexidine and alcohol and then injected with 2ml of Euflexxa     How tolerated by patient: tolerated the procedure well with no complications    Post injection instructions were given to Peter Burchorne:       Electronically signed by:   MENDEL Schilling  2025,  8:41 AM

## 2025-05-27 ENCOUNTER — OFFICE VISIT (OUTPATIENT)
Dept: INTERNAL MEDICINE CLINIC | Facility: CLINIC | Age: 65
End: 2025-05-27
Payer: COMMERCIAL

## 2025-05-27 ENCOUNTER — OFFICE VISIT (OUTPATIENT)
Dept: ORTHOPEDIC SURGERY | Age: 65
End: 2025-05-27
Payer: COMMERCIAL

## 2025-05-27 VITALS
DIASTOLIC BLOOD PRESSURE: 82 MMHG | HEART RATE: 73 BPM | WEIGHT: 271 LBS | SYSTOLIC BLOOD PRESSURE: 131 MMHG | OXYGEN SATURATION: 99 % | BODY MASS INDEX: 35.75 KG/M2

## 2025-05-27 DIAGNOSIS — M26.609 TMJ DYSFUNCTION: Primary | ICD-10-CM

## 2025-05-27 DIAGNOSIS — M17.11 PRIMARY OSTEOARTHRITIS OF RIGHT KNEE: Primary | ICD-10-CM

## 2025-05-27 DIAGNOSIS — M17.12 PRIMARY OSTEOARTHRITIS OF LEFT KNEE: ICD-10-CM

## 2025-05-27 PROCEDURE — 3075F SYST BP GE 130 - 139MM HG: CPT | Performed by: INTERNAL MEDICINE

## 2025-05-27 PROCEDURE — 99214 OFFICE O/P EST MOD 30 MIN: CPT | Performed by: INTERNAL MEDICINE

## 2025-05-27 PROCEDURE — 3079F DIAST BP 80-89 MM HG: CPT | Performed by: INTERNAL MEDICINE

## 2025-05-27 PROCEDURE — 1123F ACP DISCUSS/DSCN MKR DOCD: CPT | Performed by: INTERNAL MEDICINE

## 2025-05-27 PROCEDURE — 20610 DRAIN/INJ JOINT/BURSA W/O US: CPT | Performed by: PHYSICIAN ASSISTANT

## 2025-05-27 NOTE — PROGRESS NOTES
[facility]          Patient ID:  Name: Peter Warren  AGE/Gender: 64 y.o. male  MRN: [id number]  : 1960    Date of Consultation:  May 27, 2025        ALLERGIES:   Allergies   Allergen Reactions    Clavulanic Acid     Amoxicillin Swelling and Rash    Amoxicillin Rash          Diagnosis: Osteoarthritis of Both Knees    PROCEDURE:  2ml of Euflexxa  Injection of Both Knees        The procedure was explained to the patient and possible adverse reactions were discussed.      TIME OUT performed immediately prior to start of procedure:   IDinesh PA, have performed the following reviews on Peter Warren prior to the start of the procedure:            * Patient was identified by name and date of birth   * Agreement on procedure being performed was verified  * Risks and Benefits explained to the patient  * Procedure site verified and marked as necessary  * Patient was positioned for comfort  Both Knees anterolateral aspect was prepped and cleansed with: sterile fashion with chlorhexidine and alcohol and then injected with 2ml of Euflexxa     How tolerated by patient: tolerated the procedure well with no complications    Post injection instructions were given to Peter Burchorne:       Electronically signed by:   MENDEL Schilling  2025,  9:13 AM

## 2025-05-27 NOTE — PROGRESS NOTES
the patient including all elective or patient requested orders.  Decision to order or not order tests are based on this risk/benefit ratio and medical necessity.  -The patient expresses understanding of the plan as I've explained it to him and is in agreement with the current plan.    Follow Up: At next scheduled visit    Total Time: 30 minutes (chart reviewing and in-exam time)    Signed: Venancio Verma D.O.  05/27/25  2:58 PM

## 2025-06-03 ENCOUNTER — OFFICE VISIT (OUTPATIENT)
Dept: ORTHOPEDIC SURGERY | Age: 65
End: 2025-06-03

## 2025-06-03 DIAGNOSIS — M17.12 PRIMARY OSTEOARTHRITIS OF LEFT KNEE: Primary | ICD-10-CM

## 2025-06-03 DIAGNOSIS — M17.11 PRIMARY OSTEOARTHRITIS OF RIGHT KNEE: ICD-10-CM

## 2025-06-03 NOTE — PROGRESS NOTES
[facility]          Patient ID:  Name: Peter Warren  AGE/Gender: 64 y.o. male  MRN: [id number]  : 1960    Date of Consultation:  Kaycee 3, 2025        ALLERGIES:   Allergies   Allergen Reactions    Clavulanic Acid     Amoxicillin Swelling and Rash    Amoxicillin Rash          Diagnosis: Osteoarthritis of Both Knees    PROCEDURE:  2ml of Euflexxa  Injection of Both Knees        The procedure was explained to the patient and possible adverse reactions were discussed.      TIME OUT performed immediately prior to start of procedure:   IDinesh PA, have performed the following reviews on Peter Warren prior to the start of the procedure:            * Patient was identified by name and date of birth   * Agreement on procedure being performed was verified  * Risks and Benefits explained to the patient  * Procedure site verified and marked as necessary  * Patient was positioned for comfort  Both Knees anterolateral aspect was prepped and cleansed with: sterile fashion with chlorhexidine and alcohol and then injected with 2ml of Euflexxa     How tolerated by patient: tolerated the procedure well with no complications    Post injection instructions were given to Peter Burchorne:       Electronically signed by:   MENDEL Schilling  6/3/2025,  9:09 AM

## 2025-06-24 ENCOUNTER — OFFICE VISIT (OUTPATIENT)
Dept: ORTHOPEDIC SURGERY | Age: 65
End: 2025-06-24
Payer: COMMERCIAL

## 2025-06-24 ENCOUNTER — TELEPHONE (OUTPATIENT)
Dept: ORTHOPEDIC SURGERY | Age: 65
End: 2025-06-24

## 2025-06-24 DIAGNOSIS — M25.551 RIGHT HIP PAIN: Primary | ICD-10-CM

## 2025-06-24 PROCEDURE — 99214 OFFICE O/P EST MOD 30 MIN: CPT | Performed by: NURSE PRACTITIONER

## 2025-06-24 PROCEDURE — 1123F ACP DISCUSS/DSCN MKR DOCD: CPT | Performed by: NURSE PRACTITIONER

## 2025-06-24 RX ORDER — METHYLPREDNISOLONE 4 MG/1
TABLET ORAL
Qty: 1 KIT | Refills: 1 | Status: SHIPPED | OUTPATIENT
Start: 2025-06-24 | End: 2025-06-30

## 2025-06-24 NOTE — PROGRESS NOTES
Patient ID:    Peter Warren  [id number]  64 y.o.  1960    Today: June 24, 2025          Chief Complaint: Right hip pain        Patient reports 1.5 week history of right hip pain.  He states he was doing strenuous yard work when started having significant hip pain.  He has started using a cane. The pain is predominately localized to the anterior groin and is described as a  general ache with occasional sharp pain.  They rate the pain ranging from 3-8 on the pain scale occurring in a cyclical fashion with periods of acute exacerbation. Symptoms are exacerbated with getting into and out of their vehicle, crossing their legs, and attempting to put on socks/shoes. No significant pain noted with laying on the affected hip. No numbness of tingling going down the extremity.  Patient has attempted prior conservative treatment including Activity modification.        Past Medical History:  Past Medical History:   Diagnosis Date    A-fib ([facility])     A-fib (HCC)     managed with med    Anesthesia     woke up early from ablation    Atrial fibrillation ([facility]) 2015,2016    3 ablations    Bilateral carpal tunnel syndrome 04/18/2022    Bilateral hand pain 07/07/2022    Bilateral leg numbness 04/14/2022    BPH (benign prostatic hyperplasia)     Cancer ([facility]) 2012    basal  cell    COVID-19 06/22/2022    Cubital tunnel syndrome, left 07/12/2022    Cubital tunnel syndrome, right 07/12/2022    Hearing loss 2021    Hx of blood clots     Hx of skin cancer, basal cell 01/05/2022    Hypertension     Hypothyroid     Jaw fracture (HCC)     Left carpal tunnel syndrome 07/12/2022    Medial meniscus tear     DANE (obstructive sleep apnea)     Paresthesia of both hands 07/07/2022    Peripheral polyneuropathy 01/05/2022    Personal history of prostate cancer 2012    Prostate cancer ([facility])     Pulmonary emboli (HCC) 01/2022    Bilateral    Pulmonary embolism (HCC) 01/28/2022    Right carpal tunnel syndrome 07/12/2022    Sarcoidosis

## 2025-06-24 NOTE — TELEPHONE ENCOUNTER
Her MRI of her hip is marked clinical and it needs to be marked ans if it is not being done at the office. Can you change the order please.

## 2025-06-30 ENCOUNTER — TELEPHONE (OUTPATIENT)
Dept: ORTHOPEDIC SURGERY | Age: 65
End: 2025-06-30

## 2025-07-02 ENCOUNTER — OFFICE VISIT (OUTPATIENT)
Dept: ORTHOPEDIC SURGERY | Age: 65
End: 2025-07-02
Payer: COMMERCIAL

## 2025-07-02 DIAGNOSIS — M70.71 ILIOPSOAS BURSITIS OF RIGHT HIP: ICD-10-CM

## 2025-07-02 DIAGNOSIS — M25.551 RIGHT HIP PAIN: Primary | ICD-10-CM

## 2025-07-02 PROCEDURE — 1123F ACP DISCUSS/DSCN MKR DOCD: CPT | Performed by: NURSE PRACTITIONER

## 2025-07-02 PROCEDURE — 99214 OFFICE O/P EST MOD 30 MIN: CPT | Performed by: NURSE PRACTITIONER

## 2025-07-02 RX ORDER — CELECOXIB 100 MG/1
100 CAPSULE ORAL 2 TIMES DAILY
Qty: 60 CAPSULE | Refills: 0 | Status: SHIPPED | OUTPATIENT
Start: 2025-07-02

## 2025-07-02 NOTE — PROGRESS NOTES
Patient ID:    Peter Warren  [id number]  65 y.o.  1960    Today: July 2, 2025          Chief Complaint: Right hip pain        Patient reports 1 month history of right hip pain that started after doing strenuous yard work.  The pain is predominately localized to the anterior groin and is described as a  general ache with occasional sharp pain.  They rate the pain ranging from 3-8 on the pain scale occurring in a cyclical fashion with periods of acute exacerbation. Symptoms were exacerbated with getting into and out of their vehicle, crossing their legs, and attempting to put on socks/shoes.  He has noted issues with those activities recently but today he is not having trouble with getting into a vehicle or putting on socks/shoes. No significant pain noted with laying on the affected hip. No numbness of tingling going down the extremity.  Patient has attempted prior conservative treatment including activity modification, NSAIDS, and medrol dosepack which provided significant but short-term relief.          Past Medical History:  Past Medical History:   Diagnosis Date    A-fib ([facility])     A-fib (HCC)     managed with med    Anesthesia     woke up early from ablation    Atrial fibrillation ([facility]) 2015,2016    3 ablations    Bilateral carpal tunnel syndrome 04/18/2022    Bilateral hand pain 07/07/2022    Bilateral leg numbness 04/14/2022    BPH (benign prostatic hyperplasia)     Cancer ([facility]) 2012    basal  cell    COVID-19 06/22/2022    Cubital tunnel syndrome, left 07/12/2022    Cubital tunnel syndrome, right 07/12/2022    Hearing loss 2021    Hx of blood clots     Hx of skin cancer, basal cell 01/05/2022    Hypertension     Hypothyroid     Jaw fracture ([facility])     Left carpal tunnel syndrome 07/12/2022    Medial meniscus tear     DANE (obstructive sleep apnea)     Paresthesia of both hands 07/07/2022    Peripheral polyneuropathy 01/05/2022    Personal history of prostate cancer 2012    Prostate cancer

## 2025-07-09 ENCOUNTER — OFFICE VISIT (OUTPATIENT)
Dept: INTERNAL MEDICINE CLINIC | Facility: CLINIC | Age: 65
End: 2025-07-09
Payer: COMMERCIAL

## 2025-07-09 ENCOUNTER — OFFICE VISIT (OUTPATIENT)
Dept: ORTHOPEDIC SURGERY | Age: 65
End: 2025-07-09

## 2025-07-09 VITALS
WEIGHT: 276 LBS | DIASTOLIC BLOOD PRESSURE: 82 MMHG | OXYGEN SATURATION: 99 % | SYSTOLIC BLOOD PRESSURE: 137 MMHG | HEART RATE: 77 BPM | BODY MASS INDEX: 36.41 KG/M2

## 2025-07-09 DIAGNOSIS — D86.9 SARCOIDOSIS: ICD-10-CM

## 2025-07-09 DIAGNOSIS — H90.3 SENSORINEURAL HEARING LOSS (SNHL) OF BOTH EARS: ICD-10-CM

## 2025-07-09 DIAGNOSIS — G47.33 OSA (OBSTRUCTIVE SLEEP APNEA): ICD-10-CM

## 2025-07-09 DIAGNOSIS — K21.00 GASTROESOPHAGEAL REFLUX DISEASE WITH ESOPHAGITIS WITHOUT HEMORRHAGE: ICD-10-CM

## 2025-07-09 DIAGNOSIS — Z79.899 OTHER LONG TERM (CURRENT) DRUG THERAPY: ICD-10-CM

## 2025-07-09 DIAGNOSIS — Z13.6 ENCOUNTER FOR ABDOMINAL AORTIC ANEURYSM (AAA) SCREENING: ICD-10-CM

## 2025-07-09 DIAGNOSIS — R73.03 PREDIABETES: ICD-10-CM

## 2025-07-09 DIAGNOSIS — E66.01 CLASS 2 SEVERE OBESITY DUE TO EXCESS CALORIES WITH SERIOUS COMORBIDITY AND BODY MASS INDEX (BMI) OF 38.0 TO 38.9 IN ADULT (HCC): ICD-10-CM

## 2025-07-09 DIAGNOSIS — E78.00 PURE HYPERCHOLESTEROLEMIA, UNSPECIFIED: ICD-10-CM

## 2025-07-09 DIAGNOSIS — I48.0 PAROXYSMAL ATRIAL FIBRILLATION (HCC): Primary | ICD-10-CM

## 2025-07-09 DIAGNOSIS — M25.551 RIGHT HIP PAIN: Primary | ICD-10-CM

## 2025-07-09 DIAGNOSIS — E66.812 CLASS 2 SEVERE OBESITY DUE TO EXCESS CALORIES WITH SERIOUS COMORBIDITY AND BODY MASS INDEX (BMI) OF 38.0 TO 38.9 IN ADULT (HCC): ICD-10-CM

## 2025-07-09 DIAGNOSIS — E66.9 OBESITY (BMI 35.0-39.9 WITHOUT COMORBIDITY): ICD-10-CM

## 2025-07-09 DIAGNOSIS — I10 PRIMARY HYPERTENSION: ICD-10-CM

## 2025-07-09 DIAGNOSIS — M17.0 BILATERAL PRIMARY OSTEOARTHRITIS OF KNEE: ICD-10-CM

## 2025-07-09 DIAGNOSIS — K21.9 GASTROESOPHAGEAL REFLUX DISEASE, UNSPECIFIED WHETHER ESOPHAGITIS PRESENT: ICD-10-CM

## 2025-07-09 LAB
ALBUMIN SERPL-MCNC: 3.6 G/DL (ref 3.2–4.6)
ALBUMIN/GLOB SERPL: 1.3 (ref 1–1.9)
ALP SERPL-CCNC: 119 U/L (ref 40–129)
ALT SERPL-CCNC: 27 U/L (ref 8–55)
ANION GAP SERPL CALC-SCNC: 11 MMOL/L (ref 7–16)
AST SERPL-CCNC: 24 U/L (ref 15–37)
BASOPHILS # BLD: 0.04 K/UL (ref 0–0.2)
BASOPHILS NFR BLD: 0.4 % (ref 0–2)
BILIRUB SERPL-MCNC: 0.5 MG/DL (ref 0–1.2)
BUN SERPL-MCNC: 16 MG/DL (ref 8–23)
CALCIUM SERPL-MCNC: 9.2 MG/DL (ref 8.8–10.2)
CHLORIDE SERPL-SCNC: 105 MMOL/L (ref 98–107)
CO2 SERPL-SCNC: 23 MMOL/L (ref 20–29)
CREAT SERPL-MCNC: 0.73 MG/DL (ref 0.8–1.3)
DIFFERENTIAL METHOD BLD: ABNORMAL
EOSINOPHIL # BLD: 0.37 K/UL (ref 0–0.8)
EOSINOPHIL NFR BLD: 3.7 % (ref 0.5–7.8)
ERYTHROCYTE [DISTWIDTH] IN BLOOD BY AUTOMATED COUNT: 13.6 % (ref 11.9–14.6)
EST. AVERAGE GLUCOSE BLD GHB EST-MCNC: 134 MG/DL
GLOBULIN SER CALC-MCNC: 2.7 G/DL (ref 2.3–3.5)
GLUCOSE SERPL-MCNC: 106 MG/DL (ref 70–99)
HBA1C MFR BLD: 6.3 % (ref 0–5.6)
HCT VFR BLD AUTO: 40.7 % (ref 41.1–50.3)
HGB BLD-MCNC: 13.3 G/DL (ref 13.6–17.2)
IMM GRANULOCYTES # BLD AUTO: 0.06 K/UL (ref 0–0.5)
IMM GRANULOCYTES NFR BLD AUTO: 0.6 % (ref 0–5)
LYMPHOCYTES # BLD: 2.2 K/UL (ref 0.5–4.6)
LYMPHOCYTES NFR BLD: 22 % (ref 13–44)
MCH RBC QN AUTO: 30.9 PG (ref 26.1–32.9)
MCHC RBC AUTO-ENTMCNC: 32.7 G/DL (ref 31.4–35)
MCV RBC AUTO: 94.4 FL (ref 82–102)
MONOCYTES # BLD: 1.18 K/UL (ref 0.1–1.3)
MONOCYTES NFR BLD: 11.8 % (ref 4–12)
NEUTS SEG # BLD: 6.17 K/UL (ref 1.7–8.2)
NEUTS SEG NFR BLD: 61.5 % (ref 43–78)
NRBC # BLD: 0 K/UL (ref 0–0.2)
PLATELET # BLD AUTO: 236 K/UL (ref 150–450)
PMV BLD AUTO: 11.1 FL (ref 9.4–12.3)
POTASSIUM SERPL-SCNC: 4.2 MMOL/L (ref 3.5–5.1)
PROT SERPL-MCNC: 6.3 G/DL (ref 6.3–8.2)
RBC # BLD AUTO: 4.31 M/UL (ref 4.23–5.6)
SODIUM SERPL-SCNC: 140 MMOL/L (ref 136–145)
WBC # BLD AUTO: 10 K/UL (ref 4.3–11.1)

## 2025-07-09 PROCEDURE — G2211 COMPLEX E/M VISIT ADD ON: HCPCS | Performed by: INTERNAL MEDICINE

## 2025-07-09 PROCEDURE — 3079F DIAST BP 80-89 MM HG: CPT | Performed by: INTERNAL MEDICINE

## 2025-07-09 PROCEDURE — 1123F ACP DISCUSS/DSCN MKR DOCD: CPT | Performed by: INTERNAL MEDICINE

## 2025-07-09 PROCEDURE — 3075F SYST BP GE 130 - 139MM HG: CPT | Performed by: INTERNAL MEDICINE

## 2025-07-09 PROCEDURE — 99214 OFFICE O/P EST MOD 30 MIN: CPT | Performed by: INTERNAL MEDICINE

## 2025-07-09 RX ORDER — PHENTERMINE HYDROCHLORIDE 15 MG/1
15 CAPSULE ORAL EVERY MORNING
Qty: 30 CAPSULE | Refills: 2 | Status: SHIPPED | OUTPATIENT
Start: 2025-07-09 | End: 2025-10-07

## 2025-07-09 RX ORDER — ATORVASTATIN CALCIUM 40 MG/1
40 TABLET, FILM COATED ORAL DAILY
Qty: 90 TABLET | Refills: 3 | Status: SHIPPED | OUTPATIENT
Start: 2025-07-09

## 2025-07-09 RX ORDER — PANTOPRAZOLE SODIUM 40 MG/1
40 TABLET, DELAYED RELEASE ORAL DAILY
Qty: 90 TABLET | Refills: 3 | Status: SHIPPED | OUTPATIENT
Start: 2025-07-09

## 2025-07-09 RX ORDER — TRIAMCINOLONE ACETONIDE 40 MG/ML
40 INJECTION, SUSPENSION INTRA-ARTICULAR; INTRAMUSCULAR ONCE
Status: COMPLETED | OUTPATIENT
Start: 2025-07-09 | End: 2025-07-09

## 2025-07-09 RX ADMIN — TRIAMCINOLONE ACETONIDE 40 MG: 40 INJECTION, SUSPENSION INTRA-ARTICULAR; INTRAMUSCULAR at 11:26

## 2025-07-09 NOTE — PROGRESS NOTES
Tobacco comments:     cigars after quitting cigs   Vaping Use    Vaping status: Never Used   Substance and Sexual Activity    Alcohol use: Yes     Alcohol/week: 3.0 standard drinks of alcohol     Types: 3 Glasses of wine per week     Comment: ocasionally    Drug use: Never    Sexual activity: Yes     Partners: Female   Other Topics Concern    Not on file   Social History Narrative    ** Merged History Encounter **          Social Drivers of Health     Financial Resource Strain: Low Risk  (9/9/2024)    Received from [facility]    Overall Financial Resource Strain (CARDIA)     Difficulty of Paying Living Expenses: Not hard at all   Food Insecurity: Not on file (4/7/2025)   Transportation Needs: No Transportation Needs (9/9/2024)    Received from [facility]    PRAPARE - Transportation     Lack of Transportation (Medical): No     Lack of Transportation (Non-Medical): No   Physical Activity: Sufficiently Active (4/7/2025)    Exercise Vital Sign     Days of Exercise per Week: 3 days     Minutes of Exercise per Session: 60 min   Stress: No Stress Concern Present (9/9/2024)    Received from [facility]    [employer] - Occupational Stress Questionnaire     Feeling of Stress : Not at all   Social Connections: Socially Integrated (9/9/2024)    Received from [facility]    Social Connection and Isolation Panel [NHANES]     Frequency of Communication with Friends and Family: More than three times a week     Frequency of Social Gatherings with Friends and Family: Once a week     Attends Bahai Services: More than 4 times per year     Active Member of Clubs or Organizations: Yes     Attends Club or Organization Meetings: More than 4 times per year     Marital Status:    Intimate Partner Violence: Not At Risk (9/9/2024)    Received from [facility]

## 2025-07-15 ENCOUNTER — HOSPITAL ENCOUNTER (OUTPATIENT)
Dept: MRI IMAGING | Age: 65
Discharge: HOME | End: 2025-07-17
Attending: ORTHOPAEDIC SURGERY
Payer: COMMERCIAL

## 2025-07-15 DIAGNOSIS — M25.551 RIGHT HIP PAIN: ICD-10-CM

## 2025-07-15 PROCEDURE — 73721 MRI JNT OF LWR EXTRE W/O DYE: CPT

## 2025-07-16 ENCOUNTER — PATIENT MESSAGE (OUTPATIENT)
Dept: INTERNAL MEDICINE CLINIC | Facility: CLINIC | Age: 65
End: 2025-07-16

## 2025-07-22 ENCOUNTER — HOSPITAL ENCOUNTER (OUTPATIENT)
Dept: ULTRASOUND IMAGING | Age: 65
Discharge: HOME | End: 2025-07-24
Attending: INTERNAL MEDICINE
Payer: COMMERCIAL

## 2025-07-22 DIAGNOSIS — Z13.6 ENCOUNTER FOR ABDOMINAL AORTIC ANEURYSM (AAA) SCREENING: ICD-10-CM

## 2025-07-22 PROCEDURE — 76706 US ABDL AORTA SCREEN AAA: CPT

## 2025-07-22 PROCEDURE — 76706 US ABDL AORTA SCREEN AAA: CPT | Performed by: RADIOLOGY

## 2025-07-25 ENCOUNTER — OFFICE VISIT (OUTPATIENT)
Dept: ORTHOPEDIC SURGERY | Age: 65
End: 2025-07-25
Payer: COMMERCIAL

## 2025-07-25 DIAGNOSIS — M25.551 PAIN IN RIGHT HIP: Primary | ICD-10-CM

## 2025-07-25 PROCEDURE — 1123F ACP DISCUSS/DSCN MKR DOCD: CPT | Performed by: NURSE PRACTITIONER

## 2025-07-25 PROCEDURE — 99213 OFFICE O/P EST LOW 20 MIN: CPT | Performed by: NURSE PRACTITIONER

## 2025-07-25 RX ORDER — SENNOSIDES 8.6 MG/1
1 TABLET ORAL 2 TIMES DAILY
Qty: 14 TABLET | Refills: 0 | Status: SHIPPED | OUTPATIENT
Start: 2025-07-25 | End: 2025-08-01

## 2025-07-25 RX ORDER — TRAMADOL HYDROCHLORIDE 50 MG/1
50 TABLET ORAL EVERY 4 HOURS PRN
Qty: 42 TABLET | Refills: 0 | Status: SHIPPED | OUTPATIENT
Start: 2025-07-25 | End: 2025-08-01

## 2025-07-25 NOTE — PROGRESS NOTES
Patient ID:    Peter Warren  [id number]  65 y.o.  1960    Today: July 25, 2025          Chief Complaint: Right hip pain      Patient returns to office today with chief complaint of right hip pain.  MRI shows  AVN in bilateral hips, more extensive in the right hip.  The pain is predominately localized to the anterior groin and is described as a general ache with occasional sharp pain.  He is ambulating with crutches. Pain has progressed.  Symptoms are exacerbated with getting into and out of their vehicle, crossing their legs, and attempting to put on socks/shoes. No numbness of tingling going down the extremity.  Patient has attempted prior conservative treatment including activity modification, Celebrex, and Iliopsoas injection.  Symptoms are interfering with quality of life.        Past Medical History:  Past Medical History:   Diagnosis Date    A-fib ([facility])     A-fib (HCC)     managed with med    Anesthesia     woke up early from ablation    Atrial fibrillation ([facility]) 2015,2016    3 ablations    Bilateral carpal tunnel syndrome 04/18/2022    Bilateral hand pain 07/07/2022    Bilateral leg numbness 04/14/2022    BPH (benign prostatic hyperplasia)     Cancer ([facility]) 2012    basal  cell    Cervical disc disorder 10/22    bulging disk    COVID-19 06/22/2022    Cubital tunnel syndrome, left 07/12/2022    Cubital tunnel syndrome, right 07/12/2022    Difficulty walking 2018    Elevated PSA 4/2011    Hearing loss 2021    Hx of blood clots     Hx of skin cancer, basal cell 01/05/2022    Hypertension     Hypothyroid     Jaw fracture ([facility])     Left carpal tunnel syndrome 07/12/2022    Low back pain 10/22    Medial meniscus tear     DANE (obstructive sleep apnea)     Paresthesia of both hands 07/07/2022    Peripheral polyneuropathy 01/05/2022    Personal history of prostate cancer 2012    Prostate cancer ([facility])     Pulmonary emboli ([facility]) 01/2022    Bilateral    Pulmonary embolism ([facility]) 01/28/2022    Right

## 2025-07-29 ENCOUNTER — OFFICE VISIT (OUTPATIENT)
Dept: ORTHOPEDIC SURGERY | Age: 65
End: 2025-07-29

## 2025-07-29 DIAGNOSIS — M87.059 AVASCULAR NECROSIS OF FEMUR, UNSPECIFIED LATERALITY (HCC): Primary | ICD-10-CM

## 2025-07-29 NOTE — PROGRESS NOTES
Patient ID:    Peter Warren  [id number]  65 y.o.  1960    Today: July 29, 2025          Chief Complaint: Right hip pain        Patient reports longstanding history of right hip pain.  The pain is predominately localized to the anterior groin and is described as a  general ache with occasional sharp pain.  They rate the pain ranging from 3-8 on the pain scale occurring in a cyclical fashion with periods of acute exacerbation. Symptoms are exacerbated with getting into and out of their vehicle, crossing their legs, and attempting to put on socks/shoes. No significant pain noted with laying on the affected hip. The patient's hip pain cause moderate to severe limitations in the activities of rising from bed, putting on socks/shoes, rising from a sitting position, bending towards the floor and kneeling, twisting and pivoting on the limb and squatting. In addition, at times the patient reports extreme difficulty with these activities. No numbness of tingling going down the extremity.  Patient has attempted prior conservative treatment including medications, activity modification, at least 6 weeks of strengthening exercise/physical therapy, weight loss (if applicable), +/-hip injections consisting or either trochanteric bursitis injections or intra-articular injections.        Past Medical History:  Past Medical History:   Diagnosis Date    A-fib ([facility])     A-fib ([facility])     managed with med    Anesthesia     woke up early from ablation    Atrial fibrillation ([facility]) 2015,2016    3 ablations    Bilateral carpal tunnel syndrome 04/18/2022    Bilateral hand pain 07/07/2022    Bilateral leg numbness 04/14/2022    BPH (benign prostatic hyperplasia)     Cancer ([facility]) 2012    basal  cell    Cervical disc disorder 10/22    bulging disk    COVID-19 06/22/2022    Cubital tunnel syndrome, left 07/12/2022    Cubital tunnel syndrome, right 07/12/2022    Difficulty walking 2018    Elevated PSA 4/2011    Hearing loss 2021

## 2025-07-30 ENCOUNTER — TELEPHONE (OUTPATIENT)
Age: 65
End: 2025-07-30

## 2025-07-30 DIAGNOSIS — M16.11 PRIMARY OSTEOARTHRITIS OF RIGHT HIP: Primary | ICD-10-CM

## 2025-07-30 NOTE — TELEPHONE ENCOUNTER
Patient called to reschedule appointment with Dr. Ledezma to be seen sooner because the patient has a hip replacement surgery on 8/20 and has to have an EKG done before the date of surgery.

## 2025-08-01 ENCOUNTER — PATIENT MESSAGE (OUTPATIENT)
Dept: ORTHOPEDIC SURGERY | Age: 65
End: 2025-08-01

## 2025-08-01 DIAGNOSIS — M25.551 PAIN IN RIGHT HIP: ICD-10-CM

## 2025-08-02 RX ORDER — TRAMADOL HYDROCHLORIDE 50 MG/1
50 TABLET ORAL EVERY 4 HOURS PRN
Qty: 42 TABLET | Refills: 0 | Status: SHIPPED | OUTPATIENT
Start: 2025-08-02 | End: 2025-08-09

## 2025-08-04 ENCOUNTER — HOSPITAL ENCOUNTER (OUTPATIENT)
Dept: REHABILITATION | Age: 65
Discharge: HOME | End: 2025-08-07
Payer: COMMERCIAL

## 2025-08-04 ENCOUNTER — HOSPITAL ENCOUNTER (OUTPATIENT)
Dept: SURGERY | Age: 65
Discharge: HOME | End: 2025-08-07
Payer: COMMERCIAL

## 2025-08-04 VITALS
RESPIRATION RATE: 16 BRPM | DIASTOLIC BLOOD PRESSURE: 95 MMHG | TEMPERATURE: 98 F | OXYGEN SATURATION: 98 % | HEART RATE: 95 BPM | BODY MASS INDEX: 34.3 KG/M2 | WEIGHT: 258.8 LBS | HEIGHT: 73 IN | SYSTOLIC BLOOD PRESSURE: 141 MMHG

## 2025-08-04 DIAGNOSIS — Z01.818 PRE-OP EVALUATION: ICD-10-CM

## 2025-08-04 LAB
ALBUMIN SERPL-MCNC: 4 G/DL (ref 3.2–4.6)
ALBUMIN/GLOB SERPL: 1.3 (ref 1–1.9)
ALP SERPL-CCNC: 176 U/L (ref 40–129)
ALT SERPL-CCNC: 25 U/L (ref 8–55)
ANION GAP SERPL CALC-SCNC: 14 MMOL/L (ref 7–16)
AST SERPL-CCNC: 23 U/L (ref 15–37)
BASOPHILS # BLD: 0.04 K/UL (ref 0–0.2)
BASOPHILS NFR BLD: 0.5 % (ref 0–2)
BILIRUB SERPL-MCNC: 0.7 MG/DL (ref 0–1.2)
BUN SERPL-MCNC: 14 MG/DL (ref 8–23)
CALCIUM SERPL-MCNC: 9.5 MG/DL (ref 8.8–10.2)
CHLORIDE SERPL-SCNC: 102 MMOL/L (ref 98–107)
CO2 SERPL-SCNC: 21 MMOL/L (ref 20–29)
CREAT SERPL-MCNC: 0.84 MG/DL (ref 0.8–1.3)
DIFFERENTIAL METHOD BLD: ABNORMAL
EKG ATRIAL RATE: 77 BPM
EKG DIAGNOSIS: NORMAL
EKG P AXIS: 74 DEGREES
EKG P-R INTERVAL: 190 MS
EKG Q-T INTERVAL: 420 MS
EKG QRS DURATION: 164 MS
EKG QTC CALCULATION (BAZETT): 475 MS
EKG R AXIS: -53 DEGREES
EKG T AXIS: -15 DEGREES
EKG VENTRICULAR RATE: 77 BPM
EOSINOPHIL # BLD: 0.2 K/UL (ref 0–0.8)
EOSINOPHIL NFR BLD: 2.3 % (ref 0.5–7.8)
ERYTHROCYTE [DISTWIDTH] IN BLOOD BY AUTOMATED COUNT: 12.6 % (ref 11.9–14.6)
EST. AVERAGE GLUCOSE BLD GHB EST-MCNC: 133 MG/DL
GLOBULIN SER CALC-MCNC: 3.1 G/DL (ref 2.3–3.5)
GLUCOSE SERPL-MCNC: 93 MG/DL (ref 70–99)
HBA1C MFR BLD: 6.3 % (ref 0–5.6)
HCT VFR BLD AUTO: 43.2 % (ref 41.1–50.3)
HGB BLD-MCNC: 14.4 G/DL (ref 13.6–17.2)
IMM GRANULOCYTES # BLD AUTO: 0.03 K/UL (ref 0–0.5)
IMM GRANULOCYTES NFR BLD AUTO: 0.4 % (ref 0–5)
INR PPP: 1
LYMPHOCYTES # BLD: 2.59 K/UL (ref 0.5–4.6)
LYMPHOCYTES NFR BLD: 30.4 % (ref 13–44)
MCH RBC QN AUTO: 29.7 PG (ref 26.1–32.9)
MCHC RBC AUTO-ENTMCNC: 33.3 G/DL (ref 31.4–35)
MCV RBC AUTO: 89.1 FL (ref 82–102)
MONOCYTES # BLD: 1.05 K/UL (ref 0.1–1.3)
MONOCYTES NFR BLD: 12.3 % (ref 4–12)
NEUTS SEG # BLD: 4.61 K/UL (ref 1.7–8.2)
NEUTS SEG NFR BLD: 54.1 % (ref 43–78)
NRBC # BLD: 0 K/UL (ref 0–0.2)
PLATELET # BLD AUTO: 308 K/UL (ref 150–450)
PMV BLD AUTO: 10.4 FL (ref 9.4–12.3)
POTASSIUM SERPL-SCNC: 3.8 MMOL/L (ref 3.5–5.1)
PROT SERPL-MCNC: 7.1 G/DL (ref 6.3–8.2)
PROTHROMBIN TIME: 13.8 SEC (ref 11.3–14.9)
RBC # BLD AUTO: 4.85 M/UL (ref 4.23–5.6)
SODIUM SERPL-SCNC: 137 MMOL/L (ref 136–145)
WBC # BLD AUTO: 8.5 K/UL (ref 4.3–11.1)

## 2025-08-04 PROCEDURE — 98960 EDU&TRN PT SELF-MGMT NQHP 1: CPT

## 2025-08-04 PROCEDURE — 80053 COMPREHEN METABOLIC PANEL: CPT

## 2025-08-04 PROCEDURE — 83036 HEMOGLOBIN GLYCOSYLATED A1C: CPT

## 2025-08-04 PROCEDURE — 94760 N-INVAS EAR/PLS OXIMETRY 1: CPT

## 2025-08-04 PROCEDURE — 93005 ELECTROCARDIOGRAM TRACING: CPT

## 2025-08-04 PROCEDURE — 85610 PROTHROMBIN TIME: CPT

## 2025-08-04 PROCEDURE — 97161 PT EVAL LOW COMPLEX 20 MIN: CPT

## 2025-08-04 PROCEDURE — 80307 DRUG TEST PRSMV CHEM ANLYZR: CPT

## 2025-08-04 PROCEDURE — 85025 COMPLETE CBC W/AUTO DIFF WBC: CPT

## 2025-08-04 PROCEDURE — 93010 ELECTROCARDIOGRAM REPORT: CPT | Performed by: INTERNAL MEDICINE

## 2025-08-04 RX ORDER — MELATONIN 10 MG
10 CAPSULE ORAL NIGHTLY
COMMUNITY

## 2025-08-04 RX ORDER — METHYLDOPA/HYDROCHLOROTHIAZIDE 250MG-25MG
TABLET ORAL DAILY
COMMUNITY

## 2025-08-04 RX ORDER — THIAMINE MONONITRATE (VIT B1) 100 MG
100 TABLET ORAL DAILY
COMMUNITY

## 2025-08-04 ASSESSMENT — PULMONARY FUNCTION TESTS
FEV1 (%PREDICTED): 100
FEV1 (LITERS): 3.53

## 2025-08-04 ASSESSMENT — PAIN DESCRIPTION - ORIENTATION: ORIENTATION: RIGHT

## 2025-08-04 ASSESSMENT — HOOS JR
RISING FROM SITTING: SEVERE
HOOS JR TOTAL INTERVAL SCORE: 36.363
SITTING: MODERATE
LYING IN BED (TURNING OVER, MAINTAINING HIP POSITION): SEVERE
GOING UP OR DOWN STAIRS: SEVERE
HOOS JR RAW SCORE: 17
HOOS JR RAW SCORE: 17
BENDING TO THE FLOOR TO PICK UP OBJECT: SEVERE
WALKING ON UNEVEN SURFACE: SEVERE

## 2025-08-04 ASSESSMENT — PROMIS GLOBAL HEALTH SCALE
TO WHAT EXTENT ARE YOU ABLE TO CARRY OUT YOUR EVERYDAY PHYSICAL ACTIVITIES SUCH AS WALKING, CLIMBING STAIRS, CARRYING GROCERIES, OR MOVING A CHAIR [ON A SCALE OF 1 (NOT AT ALL) TO 5 (COMPLETELY)]?: A LITTLE
WHO IS THE PERSON COMPLETING THE PROMIS V1.1 SURVEY?: SELF
IN GENERAL, HOW WOULD YOU RATE YOUR PHYSICAL HEALTH [ON A SCALE OF 1 (POOR) TO 5 (EXCELLENT)]?: GOOD
SUM OF RESPONSES TO QUESTIONS 3, 6, 7, & 8: 14
IN GENERAL, HOW WOULD YOU RATE YOUR MENTAL HEALTH, INCLUDING YOUR MOOD AND YOUR ABILITY TO THINK [ON A SCALE OF 1 (POOR) TO 5 (EXCELLENT)]?: VERY GOOD
SUM OF RESPONSES TO QUESTIONS 2, 4, 5, & 10: 16
IN THE PAST 7 DAYS, HOW WOULD YOU RATE YOUR FATIGUE ON AVERAGE [ON A SCALE FROM 1 (NONE) TO 5 (VERY SEVERE)]?: MODERATE
IN GENERAL, WOULD YOU SAY YOUR HEALTH IS...[ON A SCALE OF 1 (POOR) TO 5 (EXCELLENT)]: GOOD
HOW IS THE PROMIS V1.1 BEING ADMINISTERED?: PAPER
IN GENERAL, WOULD YOU SAY YOUR QUALITY OF LIFE IS...[ON A SCALE OF 1 (POOR) TO 5 (EXCELLENT)]: GOOD
IN GENERAL, PLEASE RATE HOW WELL YOU CARRY OUT YOUR USUAL SOCIAL ACTIVITIES (INCLUDES ACTIVITIES AT HOME, AT WORK, AND IN YOUR COMMUNITY, AND RESPONSIBILITIES AS A PARENT, CHILD, SPOUSE, EMPLOYEE, FRIEND, ETC) [ON A SCALE OF 1 (POOR) TO 5 (EXCELLENT)]?: GOOD
IN GENERAL, HOW WOULD YOU RATE YOUR SATISFACTION WITH YOUR SOCIAL ACTIVITIES AND RELATIONSHIPS [ON A SCALE OF 1 (POOR) TO 5 (EXCELLENT)]?: VERY GOOD
IN THE PAST 7 DAYS, HOW OFTEN HAVE YOU BEEN BOTHERED BY EMOTIONAL PROBLEMS, SUCH AS FEELING ANXIOUS, DEPRESSED, OR IRRITABLE [ON A SCALE FROM 1 (NEVER) TO 5 (ALWAYS)]?: NEVER
IN THE PAST 7 DAYS, HOW WOULD YOU RATE YOUR PAIN ON AVERAGE [ON A SCALE FROM 0 (NO PAIN) TO 10 (WORST IMAGINABLE PAIN)]?: 6

## 2025-08-04 ASSESSMENT — PAIN DESCRIPTION - LOCATION: LOCATION: HIP

## 2025-08-04 ASSESSMENT — PAIN SCALES - GENERAL: PAINLEVEL_OUTOF10: 7

## 2025-08-05 LAB
COMMENT:: NORMAL
COTININE UR QL SCN: NEGATIVE NG/ML

## 2025-08-10 DIAGNOSIS — G89.18 POSTOPERATIVE PAIN: Primary | ICD-10-CM

## 2025-08-10 RX ORDER — OMEPRAZOLE 40 MG/1
40 CAPSULE, DELAYED RELEASE ORAL DAILY
Qty: 30 CAPSULE | Refills: 0 | Status: SHIPPED | OUTPATIENT
Start: 2025-08-10

## 2025-08-10 RX ORDER — ASPIRIN 81 MG/1
81 TABLET ORAL 2 TIMES DAILY
Qty: 60 TABLET | Refills: 0 | Status: SHIPPED | OUTPATIENT
Start: 2025-08-10

## 2025-08-10 RX ORDER — SENNOSIDES 8.6 MG/1
1 TABLET ORAL 2 TIMES DAILY
Qty: 30 TABLET | Refills: 2 | Status: SHIPPED | OUTPATIENT
Start: 2025-08-10

## 2025-08-10 RX ORDER — MELOXICAM 15 MG/1
15 TABLET ORAL DAILY
Qty: 30 TABLET | Refills: 2 | Status: SHIPPED | OUTPATIENT
Start: 2025-08-10

## 2025-08-10 RX ORDER — OXYCODONE HYDROCHLORIDE 5 MG/1
10 TABLET ORAL EVERY 4 HOURS PRN
Qty: 60 TABLET | Refills: 0 | Status: SHIPPED | OUTPATIENT
Start: 2025-08-10 | End: 2025-08-17

## 2025-08-10 RX ORDER — ACETAMINOPHEN 500 MG
1000 TABLET ORAL EVERY 6 HOURS PRN
Qty: 180 TABLET | Refills: 2 | Status: SHIPPED | OUTPATIENT
Start: 2025-08-10

## 2025-08-10 RX ORDER — ONDANSETRON 4 MG/1
4 TABLET, FILM COATED ORAL 3 TIMES DAILY PRN
Qty: 30 TABLET | Refills: 1 | Status: SHIPPED | OUTPATIENT
Start: 2025-08-10

## 2025-08-11 DIAGNOSIS — M25.551 RIGHT HIP PAIN: Primary | ICD-10-CM

## 2025-08-11 RX ORDER — TRAMADOL HYDROCHLORIDE 50 MG/1
50 TABLET ORAL EVERY 4 HOURS PRN
Qty: 42 TABLET | Refills: 0 | Status: SHIPPED | OUTPATIENT
Start: 2025-08-11 | End: 2025-08-18

## 2025-08-15 ENCOUNTER — OFFICE VISIT (OUTPATIENT)
Age: 65
End: 2025-08-15
Payer: COMMERCIAL

## 2025-08-15 VITALS
BODY MASS INDEX: 34.4 KG/M2 | WEIGHT: 259.6 LBS | SYSTOLIC BLOOD PRESSURE: 106 MMHG | HEART RATE: 76 BPM | DIASTOLIC BLOOD PRESSURE: 70 MMHG | HEIGHT: 73 IN

## 2025-08-15 DIAGNOSIS — I48.0 PAROXYSMAL ATRIAL FIBRILLATION (HCC): Primary | ICD-10-CM

## 2025-08-15 DIAGNOSIS — I10 ESSENTIAL HYPERTENSION: ICD-10-CM

## 2025-08-15 DIAGNOSIS — Z98.890 H/O CARDIAC RADIOFREQUENCY ABLATION: ICD-10-CM

## 2025-08-15 PROCEDURE — 3074F SYST BP LT 130 MM HG: CPT | Performed by: INTERNAL MEDICINE

## 2025-08-15 PROCEDURE — 3078F DIAST BP <80 MM HG: CPT | Performed by: INTERNAL MEDICINE

## 2025-08-15 PROCEDURE — 1123F ACP DISCUSS/DSCN MKR DOCD: CPT | Performed by: INTERNAL MEDICINE

## 2025-08-15 PROCEDURE — 99214 OFFICE O/P EST MOD 30 MIN: CPT | Performed by: INTERNAL MEDICINE

## 2025-08-15 ASSESSMENT — ENCOUNTER SYMPTOMS
SHORTNESS OF BREATH: 0
ABDOMINAL PAIN: 0

## 2025-08-19 ENCOUNTER — ANESTHESIA EVENT (OUTPATIENT)
Dept: SURGERY | Age: 65
End: 2025-08-19
Payer: COMMERCIAL

## 2025-08-19 ENCOUNTER — OFFICE VISIT (OUTPATIENT)
Dept: ORTHOPEDIC SURGERY | Age: 65
End: 2025-08-19

## 2025-08-19 DIAGNOSIS — Z01.818 PRE-OP EVALUATION: Primary | ICD-10-CM

## 2025-08-19 RX ORDER — SODIUM CHLORIDE 9 MG/ML
INJECTION, SOLUTION INTRAVENOUS PRN
Status: CANCELLED | OUTPATIENT
Start: 2025-08-19

## 2025-08-19 RX ORDER — SODIUM CHLORIDE 0.9 % (FLUSH) 0.9 %
5-40 SYRINGE (ML) INJECTION PRN
Status: CANCELLED | OUTPATIENT
Start: 2025-08-19

## 2025-08-19 RX ORDER — SODIUM CHLORIDE 0.9 % (FLUSH) 0.9 %
5-40 SYRINGE (ML) INJECTION EVERY 12 HOURS SCHEDULED
Status: CANCELLED | OUTPATIENT
Start: 2025-08-19

## 2025-08-20 ENCOUNTER — ANESTHESIA (OUTPATIENT)
Dept: SURGERY | Age: 65
End: 2025-08-20
Payer: COMMERCIAL

## 2025-08-20 ENCOUNTER — APPOINTMENT (OUTPATIENT)
Dept: GENERAL RADIOLOGY | Age: 65
End: 2025-08-20
Attending: ORTHOPAEDIC SURGERY
Payer: COMMERCIAL

## 2025-08-20 ENCOUNTER — HOSPITAL ENCOUNTER (OUTPATIENT)
Age: 65
Discharge: HOME HEALTH | End: 2025-08-20
Attending: ORTHOPAEDIC SURGERY | Admitting: ORTHOPAEDIC SURGERY
Payer: COMMERCIAL

## 2025-08-20 VITALS
HEART RATE: 73 BPM | OXYGEN SATURATION: 97 % | TEMPERATURE: 98.1 F | DIASTOLIC BLOOD PRESSURE: 79 MMHG | HEIGHT: 73 IN | BODY MASS INDEX: 34.18 KG/M2 | SYSTOLIC BLOOD PRESSURE: 122 MMHG | WEIGHT: 257.9 LBS | RESPIRATION RATE: 16 BRPM

## 2025-08-20 PROBLEM — M16.11 OSTEOARTHRITIS OF RIGHT HIP, UNSPECIFIED OSTEOARTHRITIS TYPE: Status: ACTIVE | Noted: 2025-08-20

## 2025-08-20 PROBLEM — M87.051 AVASCULAR NECROSIS OF RIGHT FEMUR (HCC): Status: ACTIVE | Noted: 2025-08-20

## 2025-08-20 PROCEDURE — 72170 X-RAY EXAM OF PELVIS: CPT

## 2025-08-20 PROCEDURE — 6370000000 HC RX 637 (ALT 250 FOR IP): Performed by: STUDENT IN AN ORGANIZED HEALTH CARE EDUCATION/TRAINING PROGRAM

## 2025-08-20 PROCEDURE — 97530 THERAPEUTIC ACTIVITIES: CPT

## 2025-08-20 PROCEDURE — C1776 JOINT DEVICE (IMPLANTABLE): HCPCS | Performed by: ORTHOPAEDIC SURGERY

## 2025-08-20 PROCEDURE — 3700000000 HC ANESTHESIA ATTENDED CARE: Performed by: ORTHOPAEDIC SURGERY

## 2025-08-20 PROCEDURE — 7100000001 HC PACU RECOVERY - ADDTL 15 MIN: Performed by: ORTHOPAEDIC SURGERY

## 2025-08-20 PROCEDURE — 6360000002 HC RX W HCPCS: Performed by: NURSE PRACTITIONER

## 2025-08-20 PROCEDURE — 3600000015 HC SURGERY LEVEL 5 ADDTL 15MIN: Performed by: ORTHOPAEDIC SURGERY

## 2025-08-20 PROCEDURE — C1713 ANCHOR/SCREW BN/BN,TIS/BN: HCPCS | Performed by: ORTHOPAEDIC SURGERY

## 2025-08-20 PROCEDURE — 3600000005 HC SURGERY LEVEL 5 BASE: Performed by: ORTHOPAEDIC SURGERY

## 2025-08-20 PROCEDURE — 6360000002 HC RX W HCPCS: Performed by: NURSE ANESTHETIST, CERTIFIED REGISTERED

## 2025-08-20 PROCEDURE — 7100000000 HC PACU RECOVERY - FIRST 15 MIN: Performed by: ORTHOPAEDIC SURGERY

## 2025-08-20 PROCEDURE — 97165 OT EVAL LOW COMPLEX 30 MIN: CPT

## 2025-08-20 PROCEDURE — 6370000000 HC RX 637 (ALT 250 FOR IP): Performed by: NURSE PRACTITIONER

## 2025-08-20 PROCEDURE — 2580000003 HC RX 258: Performed by: STUDENT IN AN ORGANIZED HEALTH CARE EDUCATION/TRAINING PROGRAM

## 2025-08-20 PROCEDURE — 97535 SELF CARE MNGMENT TRAINING: CPT

## 2025-08-20 PROCEDURE — 97161 PT EVAL LOW COMPLEX 20 MIN: CPT

## 2025-08-20 PROCEDURE — 27130 TOTAL HIP ARTHROPLASTY: CPT | Performed by: NURSE PRACTITIONER

## 2025-08-20 PROCEDURE — 2500000003 HC RX 250 WO HCPCS: Performed by: NURSE ANESTHETIST, CERTIFIED REGISTERED

## 2025-08-20 PROCEDURE — 6360000002 HC RX W HCPCS: Performed by: STUDENT IN AN ORGANIZED HEALTH CARE EDUCATION/TRAINING PROGRAM

## 2025-08-20 PROCEDURE — 2720000010 HC SURG SUPPLY STERILE: Performed by: ORTHOPAEDIC SURGERY

## 2025-08-20 PROCEDURE — 6360000002 HC RX W HCPCS: Performed by: ORTHOPAEDIC SURGERY

## 2025-08-20 PROCEDURE — 2580000003 HC RX 258: Performed by: NURSE ANESTHETIST, CERTIFIED REGISTERED

## 2025-08-20 PROCEDURE — 27130 TOTAL HIP ARTHROPLASTY: CPT | Performed by: ORTHOPAEDIC SURGERY

## 2025-08-20 PROCEDURE — 2580000003 HC RX 258: Performed by: NURSE PRACTITIONER

## 2025-08-20 PROCEDURE — 3700000001 HC ADD 15 MINUTES (ANESTHESIA): Performed by: ORTHOPAEDIC SURGERY

## 2025-08-20 PROCEDURE — 2709999900 HC NON-CHARGEABLE SUPPLY: Performed by: ORTHOPAEDIC SURGERY

## 2025-08-20 PROCEDURE — 94760 N-INVAS EAR/PLS OXIMETRY 1: CPT

## 2025-08-20 DEVICE — IMPLANTABLE DEVICE: Type: IMPLANTABLE DEVICE | Site: HIP | Status: FUNCTIONAL

## 2025-08-20 DEVICE — STEM FEM SZ 6 L107MM 12/14 TAPR HI OFFSET HIP DUOFIX CLLRD: Type: IMPLANTABLE DEVICE | Site: HIP | Status: FUNCTIONAL

## 2025-08-20 DEVICE — HEAD FEM DIA36MM +5MM OFFSET 12/14 TAPR HIP CERAMIC BIOLOX: Type: IMPLANTABLE DEVICE | Site: HIP | Status: FUNCTIONAL

## 2025-08-20 DEVICE — SCREW BNE L35MM DIA6.5MM CANC HIP DOME PINN: Type: IMPLANTABLE DEVICE | Site: HIP | Status: FUNCTIONAL

## 2025-08-20 DEVICE — SHELL ACET CMNTLS 54 MM 3 HOLE STRL EMPHASYS LTX: Type: IMPLANTABLE DEVICE | Site: HIP | Status: FUNCTIONAL

## 2025-08-20 DEVICE — HIP H2 TOT ADV OTHER HD IMPL CAPPED SYNTHES: Type: IMPLANTABLE DEVICE | Site: HIP | Status: FUNCTIONAL

## 2025-08-20 RX ORDER — SODIUM CHLORIDE 0.9 % (FLUSH) 0.9 %
5-40 SYRINGE (ML) INJECTION PRN
Status: DISCONTINUED | OUTPATIENT
Start: 2025-08-20 | End: 2025-08-20 | Stop reason: HOSPADM

## 2025-08-20 RX ORDER — ROPIVACAINE HYDROCHLORIDE 2 MG/ML
INJECTION, SOLUTION EPIDURAL; INFILTRATION; PERINEURAL PRN
Status: DISCONTINUED | OUTPATIENT
Start: 2025-08-20 | End: 2025-08-20 | Stop reason: HOSPADM

## 2025-08-20 RX ORDER — MIDAZOLAM HYDROCHLORIDE 1 MG/ML
INJECTION, SOLUTION INTRAMUSCULAR; INTRAVENOUS
Status: DISCONTINUED | OUTPATIENT
Start: 2025-08-20 | End: 2025-08-20 | Stop reason: SDUPTHER

## 2025-08-20 RX ORDER — TRANEXAMIC ACID 650 MG/1
1300 TABLET ORAL
Status: DISCONTINUED | OUTPATIENT
Start: 2025-08-20 | End: 2025-08-20 | Stop reason: HOSPADM

## 2025-08-20 RX ORDER — IPRATROPIUM BROMIDE AND ALBUTEROL SULFATE 2.5; .5 MG/3ML; MG/3ML
1 SOLUTION RESPIRATORY (INHALATION)
Status: DISCONTINUED | OUTPATIENT
Start: 2025-08-20 | End: 2025-08-20 | Stop reason: HOSPADM

## 2025-08-20 RX ORDER — EPHEDRINE SULFATE 5 MG/ML
INJECTION INTRAVENOUS
Status: DISCONTINUED | OUTPATIENT
Start: 2025-08-20 | End: 2025-08-20 | Stop reason: SDUPTHER

## 2025-08-20 RX ORDER — VANCOMYCIN HYDROCHLORIDE 1 G/20ML
INJECTION, POWDER, LYOPHILIZED, FOR SOLUTION INTRAVENOUS PRN
Status: DISCONTINUED | OUTPATIENT
Start: 2025-08-20 | End: 2025-08-20 | Stop reason: HOSPADM

## 2025-08-20 RX ORDER — DEXAMETHASONE SODIUM PHOSPHATE 10 MG/ML
10 INJECTION, SOLUTION INTRA-ARTICULAR; INTRALESIONAL; INTRAMUSCULAR; INTRAVENOUS; SOFT TISSUE ONCE
Status: DISCONTINUED | OUTPATIENT
Start: 2025-08-21 | End: 2025-08-20 | Stop reason: HOSPADM

## 2025-08-20 RX ORDER — ATORVASTATIN CALCIUM 40 MG/1
40 TABLET, FILM COATED ORAL DAILY
Status: DISCONTINUED | OUTPATIENT
Start: 2025-08-21 | End: 2025-08-20 | Stop reason: HOSPADM

## 2025-08-20 RX ORDER — ACETAMINOPHEN 500 MG
1000 TABLET ORAL EVERY 6 HOURS
Status: DISCONTINUED | OUTPATIENT
Start: 2025-08-20 | End: 2025-08-20 | Stop reason: HOSPADM

## 2025-08-20 RX ORDER — ONDANSETRON 4 MG/1
4 TABLET, ORALLY DISINTEGRATING ORAL EVERY 8 HOURS PRN
Status: DISCONTINUED | OUTPATIENT
Start: 2025-08-20 | End: 2025-08-20 | Stop reason: HOSPADM

## 2025-08-20 RX ORDER — TRANEXAMIC ACID 100 MG/ML
INJECTION, SOLUTION INTRAVENOUS
Status: DISCONTINUED | OUTPATIENT
Start: 2025-08-20 | End: 2025-08-20 | Stop reason: SDUPTHER

## 2025-08-20 RX ORDER — OXYCODONE HYDROCHLORIDE 5 MG/1
10 TABLET ORAL PRN
Status: DISCONTINUED | OUTPATIENT
Start: 2025-08-20 | End: 2025-08-20 | Stop reason: HOSPADM

## 2025-08-20 RX ORDER — SODIUM CHLORIDE 9 MG/ML
INJECTION, SOLUTION INTRAVENOUS CONTINUOUS
Status: DISCONTINUED | OUTPATIENT
Start: 2025-08-20 | End: 2025-08-20 | Stop reason: HOSPADM

## 2025-08-20 RX ORDER — PROPOFOL 10 MG/ML
INJECTION, EMULSION INTRAVENOUS
Status: DISCONTINUED | OUTPATIENT
Start: 2025-08-20 | End: 2025-08-20 | Stop reason: SDUPTHER

## 2025-08-20 RX ORDER — TIZANIDINE 2 MG/1
4 TABLET ORAL 3 TIMES DAILY PRN
Status: DISCONTINUED | OUTPATIENT
Start: 2025-08-20 | End: 2025-08-20 | Stop reason: HOSPADM

## 2025-08-20 RX ORDER — PANTOPRAZOLE SODIUM 40 MG/1
40 TABLET, DELAYED RELEASE ORAL
Status: DISCONTINUED | OUTPATIENT
Start: 2025-08-21 | End: 2025-08-20 | Stop reason: HOSPADM

## 2025-08-20 RX ORDER — TOBRAMYCIN 1.2 G/30ML
INJECTION, POWDER, LYOPHILIZED, FOR SOLUTION INTRAVENOUS PRN
Status: DISCONTINUED | OUTPATIENT
Start: 2025-08-20 | End: 2025-08-20 | Stop reason: HOSPADM

## 2025-08-20 RX ORDER — SODIUM CHLORIDE 0.9 % (FLUSH) 0.9 %
5-40 SYRINGE (ML) INJECTION EVERY 12 HOURS SCHEDULED
Status: DISCONTINUED | OUTPATIENT
Start: 2025-08-20 | End: 2025-08-20 | Stop reason: HOSPADM

## 2025-08-20 RX ORDER — SODIUM CHLORIDE, SODIUM LACTATE, POTASSIUM CHLORIDE, CALCIUM CHLORIDE 600; 310; 30; 20 MG/100ML; MG/100ML; MG/100ML; MG/100ML
INJECTION, SOLUTION INTRAVENOUS CONTINUOUS
Status: DISCONTINUED | OUTPATIENT
Start: 2025-08-20 | End: 2025-08-20 | Stop reason: HOSPADM

## 2025-08-20 RX ORDER — LIDOCAINE HYDROCHLORIDE 10 MG/ML
1 INJECTION, SOLUTION INFILTRATION; PERINEURAL
Status: COMPLETED | OUTPATIENT
Start: 2025-08-20 | End: 2025-08-20

## 2025-08-20 RX ORDER — DEXAMETHASONE SODIUM PHOSPHATE 10 MG/ML
INJECTION, SOLUTION INTRA-ARTICULAR; INTRALESIONAL; INTRAMUSCULAR; INTRAVENOUS; SOFT TISSUE
Status: DISCONTINUED | OUTPATIENT
Start: 2025-08-20 | End: 2025-08-20 | Stop reason: SDUPTHER

## 2025-08-20 RX ORDER — DIPHENHYDRAMINE HYDROCHLORIDE 50 MG/ML
25 INJECTION, SOLUTION INTRAMUSCULAR; INTRAVENOUS EVERY 6 HOURS PRN
Status: DISCONTINUED | OUTPATIENT
Start: 2025-08-20 | End: 2025-08-20 | Stop reason: HOSPADM

## 2025-08-20 RX ORDER — ONDANSETRON 2 MG/ML
4 INJECTION INTRAMUSCULAR; INTRAVENOUS EVERY 6 HOURS PRN
Status: DISCONTINUED | OUTPATIENT
Start: 2025-08-20 | End: 2025-08-20 | Stop reason: HOSPADM

## 2025-08-20 RX ORDER — ONDANSETRON 4 MG/1
8 TABLET, ORALLY DISINTEGRATING ORAL EVERY 8 HOURS PRN
Status: DISCONTINUED | OUTPATIENT
Start: 2025-08-20 | End: 2025-08-20 | Stop reason: HOSPADM

## 2025-08-20 RX ORDER — DIPHENHYDRAMINE HCL 25 MG
25 CAPSULE ORAL EVERY 6 HOURS PRN
Status: DISCONTINUED | OUTPATIENT
Start: 2025-08-20 | End: 2025-08-20 | Stop reason: HOSPADM

## 2025-08-20 RX ORDER — FENTANYL CITRATE 50 UG/ML
INJECTION, SOLUTION INTRAMUSCULAR; INTRAVENOUS
Status: DISCONTINUED | OUTPATIENT
Start: 2025-08-20 | End: 2025-08-20 | Stop reason: SDUPTHER

## 2025-08-20 RX ORDER — ACETAMINOPHEN 500 MG
1000 TABLET ORAL ONCE
Status: COMPLETED | OUTPATIENT
Start: 2025-08-20 | End: 2025-08-20

## 2025-08-20 RX ORDER — ONDANSETRON 2 MG/ML
INJECTION INTRAMUSCULAR; INTRAVENOUS
Status: DISCONTINUED | OUTPATIENT
Start: 2025-08-20 | End: 2025-08-20 | Stop reason: SDUPTHER

## 2025-08-20 RX ORDER — CEFAZOLIN SODIUM 1 G/3ML
INJECTION, POWDER, FOR SOLUTION INTRAMUSCULAR; INTRAVENOUS
Status: DISCONTINUED | OUTPATIENT
Start: 2025-08-20 | End: 2025-08-20 | Stop reason: SDUPTHER

## 2025-08-20 RX ORDER — KETOROLAC TROMETHAMINE 30 MG/ML
INJECTION, SOLUTION INTRAMUSCULAR; INTRAVENOUS PRN
Status: DISCONTINUED | OUTPATIENT
Start: 2025-08-20 | End: 2025-08-20 | Stop reason: HOSPADM

## 2025-08-20 RX ORDER — OXYCODONE HYDROCHLORIDE 5 MG/1
5 TABLET ORAL EVERY 4 HOURS PRN
COMMUNITY

## 2025-08-20 RX ORDER — HALOPERIDOL 5 MG/ML
1 INJECTION INTRAMUSCULAR
Status: DISCONTINUED | OUTPATIENT
Start: 2025-08-20 | End: 2025-08-20 | Stop reason: HOSPADM

## 2025-08-20 RX ORDER — SODIUM CHLORIDE 9 MG/ML
INJECTION, SOLUTION INTRAVENOUS PRN
Status: DISCONTINUED | OUTPATIENT
Start: 2025-08-20 | End: 2025-08-20 | Stop reason: HOSPADM

## 2025-08-20 RX ORDER — KETAMINE HCL IN NACL, ISO-OSM 20 MG/2 ML
SYRINGE (ML) INJECTION
Status: DISCONTINUED | OUTPATIENT
Start: 2025-08-20 | End: 2025-08-20 | Stop reason: SDUPTHER

## 2025-08-20 RX ORDER — SENNA AND DOCUSATE SODIUM 50; 8.6 MG/1; MG/1
1 TABLET, FILM COATED ORAL 2 TIMES DAILY
Status: DISCONTINUED | OUTPATIENT
Start: 2025-08-20 | End: 2025-08-20 | Stop reason: HOSPADM

## 2025-08-20 RX ORDER — LABETALOL HYDROCHLORIDE 5 MG/ML
10 INJECTION, SOLUTION INTRAVENOUS
Status: DISCONTINUED | OUTPATIENT
Start: 2025-08-20 | End: 2025-08-20 | Stop reason: HOSPADM

## 2025-08-20 RX ORDER — ASPIRIN 81 MG/1
81 TABLET ORAL 2 TIMES DAILY
Status: DISCONTINUED | OUTPATIENT
Start: 2025-08-20 | End: 2025-08-20 | Stop reason: HOSPADM

## 2025-08-20 RX ORDER — OXYCODONE HYDROCHLORIDE 5 MG/1
10 TABLET ORAL EVERY 4 HOURS PRN
Status: DISCONTINUED | OUTPATIENT
Start: 2025-08-20 | End: 2025-08-20 | Stop reason: HOSPADM

## 2025-08-20 RX ORDER — HYDROMORPHONE HYDROCHLORIDE 1 MG/ML
1 INJECTION, SOLUTION INTRAMUSCULAR; INTRAVENOUS; SUBCUTANEOUS
Status: DISCONTINUED | OUTPATIENT
Start: 2025-08-20 | End: 2025-08-20 | Stop reason: HOSPADM

## 2025-08-20 RX ORDER — PROCHLORPERAZINE EDISYLATE 5 MG/ML
5 INJECTION INTRAMUSCULAR; INTRAVENOUS
Status: DISCONTINUED | OUTPATIENT
Start: 2025-08-20 | End: 2025-08-20 | Stop reason: HOSPADM

## 2025-08-20 RX ORDER — OXYCODONE HYDROCHLORIDE 5 MG/1
5 TABLET ORAL EVERY 4 HOURS PRN
Status: DISCONTINUED | OUTPATIENT
Start: 2025-08-20 | End: 2025-08-20 | Stop reason: HOSPADM

## 2025-08-20 RX ORDER — OXYCODONE HYDROCHLORIDE 5 MG/1
5 TABLET ORAL PRN
Status: DISCONTINUED | OUTPATIENT
Start: 2025-08-20 | End: 2025-08-20 | Stop reason: HOSPADM

## 2025-08-20 RX ORDER — KETOROLAC TROMETHAMINE 15 MG/ML
15 INJECTION, SOLUTION INTRAMUSCULAR; INTRAVENOUS EVERY 8 HOURS
Status: DISCONTINUED | OUTPATIENT
Start: 2025-08-20 | End: 2025-08-20 | Stop reason: HOSPADM

## 2025-08-20 RX ORDER — NALOXONE HYDROCHLORIDE 0.4 MG/ML
0.4 INJECTION, SOLUTION INTRAMUSCULAR; INTRAVENOUS; SUBCUTANEOUS PRN
Status: DISCONTINUED | OUTPATIENT
Start: 2025-08-20 | End: 2025-08-20 | Stop reason: HOSPADM

## 2025-08-20 RX ADMIN — TRANEXAMIC ACID 1000 MG: 100 INJECTION, SOLUTION INTRAVENOUS at 07:30

## 2025-08-20 RX ADMIN — EPHEDRINE SULFATE 5 MG: 5 INJECTION INTRAVENOUS at 07:26

## 2025-08-20 RX ADMIN — SODIUM CHLORIDE, SODIUM LACTATE, POTASSIUM CHLORIDE, AND CALCIUM CHLORIDE: 600; 310; 30; 20 INJECTION, SOLUTION INTRAVENOUS at 07:38

## 2025-08-20 RX ADMIN — PHENYLEPHRINE HYDROCHLORIDE 20 MCG/MIN: 10 INJECTION INTRAVENOUS at 07:48

## 2025-08-20 RX ADMIN — PHENYLEPHRINE HYDROCHLORIDE 100 MCG: 0.1 INJECTION, SOLUTION INTRAVENOUS at 07:38

## 2025-08-20 RX ADMIN — OXYCODONE HYDROCHLORIDE 10 MG: 5 TABLET ORAL at 14:11

## 2025-08-20 RX ADMIN — PROPOFOL 75 MCG/KG/MIN: 10 INJECTION, EMULSION INTRAVENOUS at 07:20

## 2025-08-20 RX ADMIN — KETOROLAC TROMETHAMINE 15 MG: 15 INJECTION, SOLUTION INTRAMUSCULAR; INTRAVENOUS at 10:03

## 2025-08-20 RX ADMIN — DEXAMETHASONE SODIUM PHOSPHATE 8 MG: 10 INJECTION INTRAMUSCULAR; INTRAVENOUS at 07:38

## 2025-08-20 RX ADMIN — CEFAZOLIN SODIUM 1 G: 1 INJECTION, POWDER, FOR SOLUTION INTRAMUSCULAR; INTRAVENOUS at 07:28

## 2025-08-20 RX ADMIN — TRANEXAMIC ACID 1300 MG: 650 TABLET ORAL at 11:54

## 2025-08-20 RX ADMIN — LIDOCAINE HYDROCHLORIDE 1 ML: 10 INJECTION, SOLUTION INFILTRATION; PERINEURAL at 06:45

## 2025-08-20 RX ADMIN — VANCOMYCIN HYDROCHLORIDE 1750 MG: 10 INJECTION, POWDER, LYOPHILIZED, FOR SOLUTION INTRAVENOUS at 06:45

## 2025-08-20 RX ADMIN — ONDANSETRON 4 MG: 2 INJECTION, SOLUTION INTRAMUSCULAR; INTRAVENOUS at 07:38

## 2025-08-20 RX ADMIN — SODIUM CHLORIDE, SODIUM LACTATE, POTASSIUM CHLORIDE, AND CALCIUM CHLORIDE: 600; 310; 30; 20 INJECTION, SOLUTION INTRAVENOUS at 06:45

## 2025-08-20 RX ADMIN — MEPIVACAINE HYDROCHLORIDE 60 MG: 20 INJECTION, SOLUTION EPIDURAL; INFILTRATION at 07:16

## 2025-08-20 RX ADMIN — ACETAMINOPHEN 1000 MG: 500 TABLET, FILM COATED ORAL at 11:02

## 2025-08-20 RX ADMIN — TIZANIDINE 4 MG: 2 TABLET ORAL at 11:01

## 2025-08-20 RX ADMIN — Medication 2 G: at 06:58

## 2025-08-20 RX ADMIN — FENTANYL CITRATE 50 MCG: 50 INJECTION, SOLUTION INTRAMUSCULAR; INTRAVENOUS at 07:04

## 2025-08-20 RX ADMIN — TRANEXAMIC ACID 1000 MG: 100 INJECTION, SOLUTION INTRAVENOUS at 08:07

## 2025-08-20 RX ADMIN — Medication 10 MG: at 07:28

## 2025-08-20 RX ADMIN — ACETAMINOPHEN 1000 MG: 500 TABLET, FILM COATED ORAL at 06:24

## 2025-08-20 RX ADMIN — Medication 10 MG: at 08:18

## 2025-08-20 RX ADMIN — FENTANYL CITRATE 25 MCG: 50 INJECTION, SOLUTION INTRAMUSCULAR; INTRAVENOUS at 07:48

## 2025-08-20 RX ADMIN — MIDAZOLAM 2 MG: 1 INJECTION INTRAMUSCULAR; INTRAVENOUS at 07:02

## 2025-08-20 RX ADMIN — OXYCODONE HYDROCHLORIDE 10 MG: 5 TABLET ORAL at 09:23

## 2025-08-20 RX ADMIN — FENTANYL CITRATE 25 MCG: 50 INJECTION, SOLUTION INTRAMUSCULAR; INTRAVENOUS at 08:04

## 2025-08-20 ASSESSMENT — PAIN SCALES - GENERAL
PAINLEVEL_OUTOF10: 0
PAINLEVEL_OUTOF10: 7
PAINLEVEL_OUTOF10: 0

## 2025-08-20 ASSESSMENT — PAIN - FUNCTIONAL ASSESSMENT
PAIN_FUNCTIONAL_ASSESSMENT: 0-10
PAIN_FUNCTIONAL_ASSESSMENT: 0-10

## 2025-08-20 ASSESSMENT — PAIN DESCRIPTION - DESCRIPTORS: DESCRIPTORS: ACHING

## 2025-08-20 ASSESSMENT — PAIN DESCRIPTION - LOCATION: LOCATION: HIP

## 2025-08-21 ENCOUNTER — TELEPHONE (OUTPATIENT)
Dept: ORTHOPEDICS UNIT | Age: 65
End: 2025-08-21

## (undated) DEVICE — CONTAINER SPEC 4.5OZ POLYPR ST

## (undated) DEVICE — PAD N ADH W3XL8IN POLY COT SFT PERF FLM EASILY CUT ABSRB

## (undated) DEVICE — SUTURE VCRL RAPIDE SZ 4-0 L18IN ABSRB UD L19MM PS-2 1/2 CIR VR496

## (undated) DEVICE — SOLUTION ANTISEP 0.75OZ 10% POVIDONE IOD DISP FOR SURG PROC

## (undated) DEVICE — Device

## (undated) DEVICE — RETRIEVER SUT L10.1IN ALUM KNURLED HNDL LOOP HEW

## (undated) DEVICE — DRAPE TOP ABSRB REINF HK AND LOOP LN HLDR ADH ALONG REINF

## (undated) DEVICE — HANDLE SUCT REG CAP FLANGETIP SMOOTH YANK

## (undated) DEVICE — STRIP,CLOSURE,WOUND,MEDI-STRIP,1/2X4: Brand: MEDLINE

## (undated) DEVICE — SUTURE NONABSORBABLE MONOFILAMENT 4-0 PS-2 18 IN BLU PROLENE 8682H

## (undated) DEVICE — GLOVE SURG SZ 65 THK91MIL LTX FREE SYN POLYISOPRENE

## (undated) DEVICE — GLOVE SURG SZ 65 L12IN FNGR THK79MIL GRN LTX FREE

## (undated) DEVICE — SPONGE GZ KERLIX W4.5INXL4.1YD COT 6 PLY OPN WV STRETCHABLE

## (undated) DEVICE — KIT INCIS MGMT 13CM PEEL AND PLC DISPOSABLE PREVENA

## (undated) DEVICE — SOLUTION IRRIG 1000ML 09% SOD CHL USP PIC PLAS CONTAINER

## (undated) DEVICE — HAND PACK: Brand: MEDLINE INDUSTRIES, INC.

## (undated) DEVICE — SEALER BPLR DIA6.0MM DISP AQUAMANTYS

## (undated) DEVICE — POWDER SURG CELLERATE RX 1 GM HYDROL COLLEGEN

## (undated) DEVICE — BLADE SAW W0.75XL3.543IN THK0.05IN FASTER SMOOTH ACCURATE

## (undated) DEVICE — SOLUTION WND IRRIGATION 450 ML 0.5 PVP-I 0.9 NACL

## (undated) DEVICE — PADDING CAST W4INXL4YD ST COT COHESIVE HND TEARABLE SPEC

## (undated) DEVICE — SUTURE STRATAFIX SPRL MCRYL + SZ 4-0 L12IN ABSRB UD PS-2 SXMP1B117

## (undated) DEVICE — BNDG,ELSTC,MATRIX,STRL,6"X5YD,LF,HOOK&LP: Brand: MEDLINE

## (undated) DEVICE — GRIPPER SURGICAL RETRACTOR DISP

## (undated) DEVICE — SUTURE VCRL SZ 4-0 L27IN ABSRB UD L19MM PS-2 3/8 CIR PRIM J426H

## (undated) DEVICE — SOLUTION IRRIG 3000ML 0.9% SOD CHL USP UROMATIC PLAS CONT

## (undated) DEVICE — COTTON UNDERCAST PADDING,REGULAR FINISH: Brand: WEBRIL

## (undated) DEVICE — SOLUTION IRRIG 1000ML 0.9% SOD CHL USP POUR PLAS BTL

## (undated) DEVICE — DRESSING,GAUZE,XEROFORM,CURAD,1"X8",ST: Brand: CURAD

## (undated) DEVICE — NEEDLE HYPO 21GA L1.5IN INTRAMUSCULAR S STL LATCH BVL UP

## (undated) DEVICE — JELLY LUBRICATING 10GM PREFIL SYR LUBE

## (undated) DEVICE — DISPOSABLE BIPOLAR CODE, 12' (3.66 M): Brand: CONMED

## (undated) DEVICE — DRAPE SURG L W23XL17IN CLR POLYETH TRNSPAR TWL STERI-DRP

## (undated) DEVICE — SUIT SURG ISOLATN ZIP TOGA XL T7 +

## (undated) DEVICE — GLOVE SURG SZ 9 L12IN FNGR THK134MIL PALM THK102MIL CUF

## (undated) DEVICE — PIN FIX TROCAR PT 1 END 9/64X9 IN 1 PT STYL SMOOTH PLN STRL
Type: IMPLANTABLE DEVICE | Site: HIP | Status: NON-FUNCTIONAL
Removed: 2025-08-20

## (undated) DEVICE — BLADE OPHTH DIA4MM MINI MENIS FLAT ARTHRO LOK

## (undated) DEVICE — GLOVE SURG SZ 65 L115IN FNGR THK134MIL PALM THK102MIL CUF

## (undated) DEVICE — SOLUTION IV 250ML 0.9% SOD CHL PH 5 INJ USP VIAFLX PLAS

## (undated) DEVICE — SLING ARM AD ULT

## (undated) DEVICE — SOLUTION IRRIG 1000ML STRL H2O USP PLAS POUR BTL

## (undated) DEVICE — APPLICATOR MEDICATED 3 CC SOLUTION HI LT ORNG CHLORAPREP 930415

## (undated) DEVICE — SUTURE MONOCRYL SZ 2-0 L27IN ABSRB UD CP-1 1 L36MM 1/2 CIR REV Y266H

## (undated) DEVICE — SUTURE ABSORBABLE MONOFILAMENT 1 CTX 36 CM 48 MM VIO PDS +

## (undated) DEVICE — SUTURE FIBERWIRE SZ 2 W/ TAPERED NEEDLE BLUE L38IN NONABSORB BLU L26.5MM 1/2 CIRCLE AR7200

## (undated) DEVICE — PAD ABD W5XL9IN GEN USE NONADHESIVE EXTRA ABSRB SFT

## (undated) DEVICE — BNDG,ELSTC,MATRIX,STRL,4"X5YD,LF,HOOK&LP: Brand: MEDLINE

## (undated) DEVICE — GLOVE SURG SZ 9 THK91MIL LTX FREE SYN POLYISOPRENE ANTI

## (undated) DEVICE — GLOVE ORANGE PI 7 1/2   MSG9075

## (undated) DEVICE — ZIMMER® STERILE DISPOSABLE TOURNIQUET CUFF WITH PLC, DUAL PORT, SINGLE BLADDER, 18 IN. (46 CM)

## (undated) DEVICE — SUTURE MONOCRYL STRATAFIX SPRL + SZ 3-0 L18IN ABSRB UD PS-2 SXMP1B107

## (undated) DEVICE — SUIT SURG ISOLATN ZIP TOGA 3XL T7 +

## (undated) DEVICE — GLOVE SURG SZ 8 L12IN FNGR THK79MIL GRN LTX FREE

## (undated) DEVICE — COVER MAYOSTAND XL W30XL57IN POLYPR DISP

## (undated) DEVICE — SYRINGE MED 10ML LUERLOCK TIP W/O SFTY DISP